# Patient Record
Sex: FEMALE | Race: BLACK OR AFRICAN AMERICAN | Employment: OTHER | URBAN - METROPOLITAN AREA
[De-identification: names, ages, dates, MRNs, and addresses within clinical notes are randomized per-mention and may not be internally consistent; named-entity substitution may affect disease eponyms.]

---

## 2017-01-06 ENCOUNTER — HOSPITAL ENCOUNTER (OUTPATIENT)
Dept: WOMENS IMAGING | Age: 57
Discharge: HOME | End: 2017-01-06
Payer: COMMERCIAL

## 2017-01-06 ENCOUNTER — HOSPITAL ENCOUNTER (OUTPATIENT)
Dept: ULTRASOUND IMAGING | Age: 57
Discharge: HOME | End: 2017-01-06
Payer: COMMERCIAL

## 2017-01-06 DIAGNOSIS — R92.8 ABNORMAL MAMMOGRAM: ICD-10-CM

## 2017-01-06 PROCEDURE — G0206 DX MAMMO INCL CAD UNI: HCPCS

## 2017-01-06 PROCEDURE — 76642 ULTRASOUND BREAST LIMITED: CPT

## 2017-07-28 PROBLEM — M47.26 OTHER SPONDYLOSIS WITH RADICULOPATHY, LUMBAR REGION: Status: ACTIVE | Noted: 2017-07-28

## 2017-08-02 PROBLEM — M47.26 OSTEOARTHRITIS OF SPINE WITH RADICULOPATHY, LUMBAR REGION: Status: ACTIVE | Noted: 2017-08-02

## 2017-09-07 PROBLEM — M54.16 LEFT LUMBAR RADICULOPATHY: Status: ACTIVE | Noted: 2017-09-07

## 2017-11-14 PROBLEM — M48.061 BILATERAL STENOSIS OF LATERAL RECESS OF LUMBAR SPINE: Status: ACTIVE | Noted: 2017-11-14

## 2018-02-12 ENCOUNTER — OFFICE VISIT (OUTPATIENT)
Dept: OBGYN CLINIC | Age: 58
End: 2018-02-12
Payer: COMMERCIAL

## 2018-02-12 VITALS
RESPIRATION RATE: 14 BRPM | HEART RATE: 84 BPM | BODY MASS INDEX: 33.71 KG/M2 | SYSTOLIC BLOOD PRESSURE: 110 MMHG | HEIGHT: 58 IN | DIASTOLIC BLOOD PRESSURE: 70 MMHG | OXYGEN SATURATION: 97 % | WEIGHT: 160.6 LBS

## 2018-02-12 DIAGNOSIS — Z01.419 WELL WOMAN EXAM WITH ROUTINE GYNECOLOGICAL EXAM: Primary | ICD-10-CM

## 2018-02-12 DIAGNOSIS — Z12.39 BREAST CANCER SCREENING: ICD-10-CM

## 2018-02-12 DIAGNOSIS — N95.0 POSTMENOPAUSAL BLEEDING: ICD-10-CM

## 2018-02-12 DIAGNOSIS — Z11.51 SCREENING FOR HPV (HUMAN PAPILLOMAVIRUS): ICD-10-CM

## 2018-02-12 LAB — PAP SMEAR: NORMAL

## 2018-02-12 PROCEDURE — 99386 PREV VISIT NEW AGE 40-64: CPT | Performed by: NURSE PRACTITIONER

## 2018-02-12 NOTE — PROGRESS NOTES
warm and dry, normal texture and tone, no lesions    Pelvic Exam:   EFG: normal external genitalia, atrophic changes noted   URETHRA: normal appearing without diverticula or lesions   VULVA: normal appearing vulva with no masses, tenderness or lesions, atrophy noted   VAGINA: atrophic changes, pale mucosa  CERVIX: nulliparous, no lesions, os is stenotic  UTERUS: difficult to assess due to body habitus, nontender. ADNEXA: difficult to assess due to body habitus, nontender. PERINEUM: normal appearing without lesions or masses   ANUS: normal appearing without lesions or masses, no fissures or hemorrhoids     ASSESSMENT:   Postmenopause  Well woman with routine gynecologic exam  Breast cancer screening  Postmenopausal bleeding    PLAN:   Counseled on short and long-term use of HRT and information given. Pt counseled on osteoporosis prevention. Advised calcium intake of 1200 mg (diet and supplement) and Vitamin D 400-800 IU daily. Pap with hpv pending  Reinforced self breast exam, mammogram ordered  Pt advised to see Dr. Giuliana Roberto for postmenopausal bleeding evaluation next available opening. Encouraged healthy lifestyle and moderated exercise (30 mins walking daily). Information given on BMI and discussed healthy weight range for her. Pt verbalized understanding. Routine health screenings and precautions discussed. RTC 12 months for well woman exam or visits PRN.

## 2018-02-12 NOTE — PATIENT INSTRUCTIONS
a breast self-exam?  · The best time to examine your breasts is usually one week after your menstrual period begins. Your breasts should not be tender then. If you do not have periods, you might do your exam on a day of the month that is easy to remember. · To examine your breasts:  ¨ Remove all your clothes above the waist and lie down. When you are lying down, your breast tissue spreads evenly over your chest wall, which makes it easier to feel all your breast tissue. ¨ Use the pads-not the fingertips-of the 3 middle fingers of your left hand to check your right breast. Move your fingers slowly in small coin-sized circles that overlap. ¨ Use three levels of pressure to feel of all your breast tissue. Use light pressure to feel the tissue close to the skin surface. Use medium pressure to feel a little deeper. Use firm pressure to feel your tissue close to your breastbone and ribs. Use each pressure level to feel your breast tissue before moving on to the next spot. ¨ Check your entire breast, moving up and down as if following a strip from the collarbone to the bra line, and from the armpit to the ribs. Repeat until you have covered the entire breast.  ¨ Repeat this procedure for your left breast, using the pads of the 3 middle fingers of your right hand. · To examine your breasts while in the shower:  ¨ Place one arm over your head and lightly soap your breast on that side. ¨ Using the pads of your fingers, gently move your hand over your breast (in the strip pattern described above), feeling carefully for any lumps or changes. ¨ Repeat for the other breast.  · Have your doctor inspect anything you notice to see if you need further testing. Where can you learn more? Go to https://ashley.InvenSense. org and sign in to your Wug[employer] account. Enter P148 in the [facility] box to learn more about \"Breast Self-Exam: Care Instructions. \"     If you do not have an account, please click on day.  ¨ Eat foods rich in calcium, like yogurt, cheese, milk, and dark green vegetables. ¨ Eat foods rich in vitamin D, like eggs, fatty fish, cereal, and fortified milk. ¨ Get some sunshine. Your body uses sunshine to make its own vitamin D. The safest time to be out in the sun is before 10 a.m. or after 3 p.m. Avoid getting sunburned. Sunburn can increase your risk of skin cancer. ¨ Talk to your doctor about taking a calcium plus vitamin D supplement. Ask about what type of calcium is right for you, and how much to take at a time. Adults ages 23 to 48 should not get more than 2,500 mg of calcium and 4,000 IU of vitamin D each day, whether it is from supplements and/or food. Adults ages 46 and older should not get more than 2,000 mg of calcium and 4,000 IU of vitamin D each day from supplements and/or food. · Get regular bone-building exercise. Weight-bearing and resistance exercises keep bones healthy by working the muscles and bones against gravity. Start out at an exercise level that feels right for you. Add a little at a time until you can do the following:  ¨ Do 30 minutes of weight-bearing exercise on most days of the week. Walking, jogging, stair climbing, and dancing are good choices. ¨ Do resistance exercises with weights or elastic bands 2 to 3 days a week. · Limit alcohol. Drink no more than 1 alcohol drink a day if you are a woman. Drink no more than 2 alcohol drinks a day if you are a man. · Do not smoke. Smoking can make bones thin faster. If you need help quitting, talk to your doctor about stop-smoking programs and medicines. These can increase your chances of quitting for good. When should you call for help? Watch closely for changes in your health, and be sure to contact your doctor if you have any problems. Where can you learn more? Go to https://ashley.brotips. org and sign in to your [employer] account.  Enter L444 in the [employer] box to learn more about alcohol or use tobacco products. Follow-up care is a key part of your treatment and safety. Be sure to make and go to all appointments, and call your doctor if you are having problems. It's also a good idea to know your test results and keep a list of the medicines you take. How can you care for yourself at home? · Practice healthy eating habits. This includes eating plenty of fruits, vegetables, whole grains, lean protein, and low-fat dairy. · If your doctor recommends it, get more exercise. Walking is a good choice. Bit by bit, increase the amount you walk every day. Try for at least 30 minutes on most days of the week. · Do not smoke. Smoking can increase your risk for health problems. If you need help quitting, talk to your doctor about stop-smoking programs and medicines. These can increase your chances of quitting for good. · Limit alcohol to 2 drinks a day for men and 1 drink a day for women. Too much alcohol can cause health problems. If you have a BMI higher than 25  · Your doctor may do other tests to check your risk for weight-related health problems. This may include measuring the distance around your waist. A waist measurement of more than 40 inches in men or 35 inches in women can increase the risk of weight-related health problems. · Talk with your doctor about steps you can take to stay healthy or improve your health. You may need to make lifestyle changes to lose weight and stay healthy, such as changing your diet and getting regular exercise. If you have a BMI lower than 18.5  · Your doctor may do other tests to check your risk for health problems. · Talk with your doctor about steps you can take to stay healthy or improve your health. You may need to make lifestyle changes to gain or maintain weight and stay healthy, such as getting more healthy foods in your diet and doing exercises to build muscle. Where can you learn more? Go to https://ashley.[facility]. org and sign in to

## 2018-02-27 DIAGNOSIS — Z01.419 WELL WOMAN EXAM WITH ROUTINE GYNECOLOGICAL EXAM: ICD-10-CM

## 2018-02-27 DIAGNOSIS — Z11.51 SCREENING FOR HPV (HUMAN PAPILLOMAVIRUS): ICD-10-CM

## 2018-05-22 ENCOUNTER — HOSPITAL ENCOUNTER (OUTPATIENT)
Dept: ORTHOPEDIC SURGERY | Age: 58
Discharge: HOME | End: 2018-05-24
Payer: COMMERCIAL

## 2018-05-22 DIAGNOSIS — M84.452D PATHOLOGICAL FRACTURE OF NECK OF LEFT FEMUR WITH ROUTINE HEALING, SUBSEQUENT ENCOUNTER: ICD-10-CM

## 2018-05-22 PROCEDURE — 73562 X-RAY EXAM OF KNEE 3: CPT

## 2018-10-15 ENCOUNTER — OFFICE VISIT (OUTPATIENT)
Dept: OBGYN CLINIC | Age: 58
End: 2018-10-15
Payer: COMMERCIAL

## 2018-10-15 VITALS
BODY MASS INDEX: 33.17 KG/M2 | DIASTOLIC BLOOD PRESSURE: 80 MMHG | WEIGHT: 158 LBS | SYSTOLIC BLOOD PRESSURE: 102 MMHG | HEIGHT: 58 IN

## 2018-10-15 DIAGNOSIS — N95.1 MENOPAUSAL SYMPTOMS: Primary | ICD-10-CM

## 2018-10-15 DIAGNOSIS — Z12.39 BREAST CANCER SCREENING: ICD-10-CM

## 2018-10-15 DIAGNOSIS — F51.01 PRIMARY INSOMNIA: ICD-10-CM

## 2018-10-15 PROCEDURE — 1036F TOBACCO NON-USER: CPT | Performed by: OBSTETRICS & GYNECOLOGY

## 2018-10-15 PROCEDURE — 3017F COLORECTAL CA SCREEN DOC REV: CPT | Performed by: OBSTETRICS & GYNECOLOGY

## 2018-10-15 PROCEDURE — G8417 CALC BMI ABV UP PARAM F/U: HCPCS | Performed by: OBSTETRICS & GYNECOLOGY

## 2018-10-15 PROCEDURE — G8484 FLU IMMUNIZE NO ADMIN: HCPCS | Performed by: OBSTETRICS & GYNECOLOGY

## 2018-10-15 PROCEDURE — G8428 CUR MEDS NOT DOCUMENT: HCPCS | Performed by: OBSTETRICS & GYNECOLOGY

## 2018-10-15 PROCEDURE — 99202 OFFICE O/P NEW SF 15 MIN: CPT | Performed by: OBSTETRICS & GYNECOLOGY

## 2018-10-15 RX ORDER — NORETHINDRONE ACETATE AND ETHINYL ESTRADIOL 1; 5 MG/1; UG/1
TABLET ORAL
COMMUNITY
End: 2018-10-15 | Stop reason: SDUPTHER

## 2018-10-15 RX ORDER — ESCITALOPRAM OXALATE 10 MG/1
TABLET ORAL
Refills: 3 | COMMUNITY
Start: 2018-09-07 | End: 2019-05-14 | Stop reason: DRUGHIGH

## 2018-10-15 RX ORDER — IBUPROFEN 800 MG/1
TABLET ORAL
Refills: 1 | COMMUNITY
Start: 2018-08-30 | End: 2019-08-23 | Stop reason: ALTCHOICE

## 2018-10-15 RX ORDER — LORATADINE 10 MG/1
10 TABLET ORAL
COMMUNITY
Start: 2012-05-08 | End: 2019-05-14 | Stop reason: ALTCHOICE

## 2018-10-15 RX ORDER — ATORVASTATIN CALCIUM 20 MG/1
TABLET, FILM COATED ORAL
Refills: 5 | COMMUNITY
Start: 2018-09-05

## 2018-10-15 RX ORDER — ZOLPIDEM TARTRATE 5 MG/1
5 TABLET ORAL NIGHTLY PRN
Qty: 30 TABLET | Refills: 1 | Status: SHIPPED | OUTPATIENT
Start: 2018-10-15 | End: 2018-10-29

## 2018-10-15 RX ORDER — LISINOPRIL AND HYDROCHLOROTHIAZIDE 20; 12.5 MG/1; MG/1
1 TABLET ORAL
COMMUNITY

## 2018-10-15 RX ORDER — TRAMADOL HYDROCHLORIDE 50 MG/1
TABLET ORAL
Refills: 0 | COMMUNITY
Start: 2018-10-05 | End: 2019-05-14

## 2018-10-15 RX ORDER — NORETHINDRONE ACETATE AND ETHINYL ESTRADIOL 1; 5 MG/1; UG/1
1 TABLET ORAL DAILY
Qty: 28 TABLET | Refills: 12 | Status: SHIPPED | OUTPATIENT
Start: 2018-10-15 | End: 2019-05-14

## 2018-10-15 NOTE — PROGRESS NOTES
SUBJECTIVE:   60 y.o.   female here to discuss HRT. PT has been on HRT for 15yrs and pt ran out of her rx. PT states since stopping medications she has felt allen and irritable with hot flushes and insomnia. PT has tried otc meds for insomnia that are not resolving her issues. Pt would like to restart HRT. PT denies any VB and pt with annual exam that was normal last year. PT does need referral for MMG. Review of Systems:  General ROS: negative  Respiratory ROS: no cough, shortness of breath, or wheezing  Cardiovascular ROS: no chest pain or dyspnea on exertion  Gastrointestinal ROS: no abdominal pain, change in bowel habits, or black or bloody stools  Genito-Urinary ROS: no dysuria, trouble voiding, or hematuria    OBJECTIVE:   /80   Ht 4' 10\" (1.473 m)   Wt 158 lb (71.7 kg)   BMI 33.02 kg/m²     Physical Exam:  GEN: She appears well, afebrile. HEENT: normal cephalic, atraumatic  CVS: regular rate and rhythm  ABDOMEN: benign, soft, nontender, no masses. MUSCULOSKELETAL: normal gait, no masses  SKIN: normal texture and tone, no lesions    ASSESSMENT:   Menopausal symptoms  Breast cancer screening  Insomnia    PLAN:   Risks, benefits and alternative therapies for HRT reviewed.  PT would like to restart therapy  Rx HRT to pharmacy   Rx Ambien to pharmacy   [facility] referral sent in

## 2018-11-07 ENCOUNTER — HOSPITAL ENCOUNTER (OUTPATIENT)
Dept: WOMENS IMAGING | Age: 58
Discharge: HOME | End: 2018-11-09
Payer: COMMERCIAL

## 2018-11-07 ENCOUNTER — TELEPHONE (OUTPATIENT)
Dept: OBGYN CLINIC | Age: 58
End: 2018-11-07

## 2018-11-07 DIAGNOSIS — R92.8 ABNORMAL MAMMOGRAM: Primary | ICD-10-CM

## 2018-11-07 DIAGNOSIS — Z12.39 BREAST CANCER SCREENING: ICD-10-CM

## 2018-11-07 PROCEDURE — 77067 SCR MAMMO BI INCL CAD: CPT

## 2018-11-15 ENCOUNTER — HOSPITAL ENCOUNTER (OUTPATIENT)
Dept: WOMENS IMAGING | Age: 58
Discharge: HOME | End: 2018-11-17
Payer: COMMERCIAL

## 2018-11-15 ENCOUNTER — HOSPITAL ENCOUNTER (OUTPATIENT)
Dept: ULTRASOUND IMAGING | Age: 58
Discharge: HOME | End: 2018-11-17
Payer: COMMERCIAL

## 2018-11-15 DIAGNOSIS — R92.8 ABNORMAL MAMMOGRAM: ICD-10-CM

## 2018-11-15 PROCEDURE — 77065 DX MAMMO INCL CAD UNI: CPT

## 2018-11-15 PROCEDURE — 76642 ULTRASOUND BREAST LIMITED: CPT

## 2018-11-16 ENCOUNTER — TELEPHONE (OUTPATIENT)
Dept: OBGYN CLINIC | Age: 58
End: 2018-11-16

## 2018-11-16 DIAGNOSIS — R92.8 ABNORMAL MAMMOGRAM: Primary | ICD-10-CM

## 2018-11-16 NOTE — TELEPHONE ENCOUNTER
----- Message from Nicolle Flowers MD sent at 11/15/2018  5:41 PM EST -----  F/u for repeat MMG in 4-6mos

## 2019-01-03 PROBLEM — M41.50 DEGENERATIVE SCOLIOSIS: Status: ACTIVE | Noted: 2019-01-03

## 2019-01-03 PROBLEM — M48.061 FORAMINAL STENOSIS OF LUMBAR REGION: Status: ACTIVE | Noted: 2019-01-03

## 2019-05-14 ENCOUNTER — OFFICE VISIT (OUTPATIENT)
Dept: OBGYN CLINIC | Age: 59
End: 2019-05-14
Payer: COMMERCIAL

## 2019-05-14 VITALS
WEIGHT: 159 LBS | SYSTOLIC BLOOD PRESSURE: 120 MMHG | BODY MASS INDEX: 33.37 KG/M2 | HEIGHT: 58 IN | DIASTOLIC BLOOD PRESSURE: 70 MMHG

## 2019-05-14 DIAGNOSIS — N95.0 PMB (POSTMENOPAUSAL BLEEDING): Primary | ICD-10-CM

## 2019-05-14 DIAGNOSIS — N95.0 PMB (POSTMENOPAUSAL BLEEDING): ICD-10-CM

## 2019-05-14 LAB
FOLLICLE STIMULATING HORMONE: 43.1 MIU/ML
T4 FREE: 0.77 NG/DL (ref 0.84–1.68)
TSH REFLEX: 2.49 UIU/ML (ref 0.44–3.86)

## 2019-05-14 PROCEDURE — 1036F TOBACCO NON-USER: CPT | Performed by: OBSTETRICS & GYNECOLOGY

## 2019-05-14 PROCEDURE — G8427 DOCREV CUR MEDS BY ELIG CLIN: HCPCS | Performed by: OBSTETRICS & GYNECOLOGY

## 2019-05-14 PROCEDURE — 3017F COLORECTAL CA SCREEN DOC REV: CPT | Performed by: OBSTETRICS & GYNECOLOGY

## 2019-05-14 PROCEDURE — G8417 CALC BMI ABV UP PARAM F/U: HCPCS | Performed by: OBSTETRICS & GYNECOLOGY

## 2019-05-14 PROCEDURE — 99213 OFFICE O/P EST LOW 20 MIN: CPT | Performed by: OBSTETRICS & GYNECOLOGY

## 2019-05-14 RX ORDER — ZOLPIDEM TARTRATE 10 MG/1
TABLET ORAL NIGHTLY PRN
COMMUNITY
End: 2020-08-13 | Stop reason: SDUPTHER

## 2019-05-14 RX ORDER — ESCITALOPRAM OXALATE 20 MG/1
TABLET ORAL
Refills: 3 | COMMUNITY
Start: 2019-04-15 | End: 2019-05-14

## 2019-05-14 RX ORDER — NORETHINDRONE ACETATE AND ETHINYL ESTRADIOL 1; 5 MG/1; UG/1
1 TABLET ORAL DAILY
COMMUNITY
End: 2019-11-11 | Stop reason: SDUPTHER

## 2019-05-14 RX ORDER — GABAPENTIN 300 MG/1
CAPSULE ORAL
Refills: 1 | COMMUNITY
Start: 2019-03-01 | End: 2019-05-14

## 2019-05-14 NOTE — PROGRESS NOTES
SUBJECTIVE:   60 y.o.   female here to discuss an episode of VB. PT currently on HRT and she forgot to take her meds for several days and she started with light spotting like a menses for 4 days that has since resolved. PT restarted her HRT when she remembered her meds and pt denies VB today. PT states she is feeling much better with the HRT and pt would like to continue therapy. Review of Systems:  General ROS: negative  Respiratory ROS: no cough, shortness of breath, or wheezing  Cardiovascular ROS: no chest pain or dyspnea on exertion  Gastrointestinal ROS: no abdominal pain, change in bowel habits, or black or bloody stools  Genito-Urinary ROS: no dysuria, trouble voiding, or hematuria    OBJECTIVE:   /70   Ht 4' 10\" (1.473 m)   Wt 159 lb (72.1 kg)   BMI 33.23 kg/m²     Physical Exam:  GEN: She appears well, afebrile. HEENT: normal cephalic, atraumatic  CVS: regular rate and rhythm  ABDOMEN: benign, soft, nontender, no masses.   MUSCULOSKELETAL: normal gait, no masses  SKIN: normal texture and tone, no lesions    ASSESSMENT:   Postmenopausal bleeding     PLAN:   TSH/FSH pending  US pending  Pt to f/u for results, if ES >4mm will plan for EMB

## 2019-05-14 NOTE — PROGRESS NOTES
The patient was asked if she would like a chaperone present for her intimate exam. She  Declined the chaperone.  Teena Paez

## 2019-05-22 ENCOUNTER — HOSPITAL ENCOUNTER (OUTPATIENT)
Dept: ULTRASOUND IMAGING | Age: 59
Discharge: HOME | End: 2019-05-24
Payer: COMMERCIAL

## 2019-05-22 DIAGNOSIS — N95.0 PMB (POSTMENOPAUSAL BLEEDING): ICD-10-CM

## 2019-05-22 PROCEDURE — 76856 US EXAM PELVIC COMPLETE: CPT

## 2019-05-22 PROCEDURE — 76830 TRANSVAGINAL US NON-OB: CPT

## 2019-05-28 DIAGNOSIS — R92.8 ABNORMAL MAMMOGRAM OF LEFT BREAST: Primary | ICD-10-CM

## 2019-06-03 ENCOUNTER — OFFICE VISIT (OUTPATIENT)
Dept: OBGYN CLINIC | Age: 59
End: 2019-06-03
Payer: COMMERCIAL

## 2019-06-03 VITALS
WEIGHT: 157.6 LBS | DIASTOLIC BLOOD PRESSURE: 70 MMHG | BODY MASS INDEX: 33.08 KG/M2 | SYSTOLIC BLOOD PRESSURE: 138 MMHG | HEIGHT: 58 IN

## 2019-06-03 DIAGNOSIS — Z09 FOLLOW UP: Primary | ICD-10-CM

## 2019-06-03 PROCEDURE — 99213 OFFICE O/P EST LOW 20 MIN: CPT | Performed by: OBSTETRICS & GYNECOLOGY

## 2019-06-03 PROCEDURE — G8427 DOCREV CUR MEDS BY ELIG CLIN: HCPCS | Performed by: OBSTETRICS & GYNECOLOGY

## 2019-06-03 PROCEDURE — 3017F COLORECTAL CA SCREEN DOC REV: CPT | Performed by: OBSTETRICS & GYNECOLOGY

## 2019-06-03 PROCEDURE — 1036F TOBACCO NON-USER: CPT | Performed by: OBSTETRICS & GYNECOLOGY

## 2019-06-03 PROCEDURE — G8417 CALC BMI ABV UP PARAM F/U: HCPCS | Performed by: OBSTETRICS & GYNECOLOGY

## 2019-06-03 RX ORDER — ESCITALOPRAM OXALATE 20 MG/1
TABLET ORAL
Refills: 3 | COMMUNITY
Start: 2019-05-22 | End: 2020-01-13

## 2019-06-06 ENCOUNTER — TELEPHONE (OUTPATIENT)
Dept: OBGYN CLINIC | Age: 59
End: 2019-06-06

## 2019-06-14 ENCOUNTER — NURSE ONLY (OUTPATIENT)
Dept: OBGYN CLINIC | Age: 59
End: 2019-06-14
Payer: COMMERCIAL

## 2019-06-14 DIAGNOSIS — N93.8 DUB (DYSFUNCTIONAL UTERINE BLEEDING): ICD-10-CM

## 2019-06-14 DIAGNOSIS — N93.8 DUB (DYSFUNCTIONAL UTERINE BLEEDING): Primary | ICD-10-CM

## 2019-06-14 LAB
BASOPHILS ABSOLUTE: 0 K/UL (ref 0–0.2)
BASOPHILS RELATIVE PERCENT: 0.8 %
EOSINOPHILS ABSOLUTE: 0.2 K/UL (ref 0–0.7)
EOSINOPHILS RELATIVE PERCENT: 3.4 %
GONADOTROPIN, CHORIONIC (HCG) QUANT: <0.1 MIU/ML
HCT VFR BLD CALC: 38 % (ref 37–47)
HEMOGLOBIN: 12.5 G/DL (ref 12–16)
LUTEINIZING HORMONE: 28.7 MIU/ML
LYMPHOCYTES ABSOLUTE: 1.8 K/UL (ref 1–4.8)
LYMPHOCYTES RELATIVE PERCENT: 33.9 %
MCH RBC QN AUTO: 28.4 PG (ref 27–31.3)
MCHC RBC AUTO-ENTMCNC: 32.8 % (ref 33–37)
MCV RBC AUTO: 86.6 FL (ref 82–100)
MONOCYTES ABSOLUTE: 0.6 K/UL (ref 0.2–0.8)
MONOCYTES RELATIVE PERCENT: 10.2 %
NEUTROPHILS ABSOLUTE: 2.8 K/UL (ref 1.4–6.5)
NEUTROPHILS RELATIVE PERCENT: 51.7 %
PDW BLD-RTO: 14.1 % (ref 11.5–14.5)
PLATELET # BLD: 295 K/UL (ref 130–400)
RBC # BLD: 4.39 M/UL (ref 4.2–5.4)
WBC # BLD: 5.4 K/UL (ref 4.8–10.8)

## 2019-06-14 PROCEDURE — 36415 COLL VENOUS BLD VENIPUNCTURE: CPT | Performed by: OBSTETRICS & GYNECOLOGY

## 2019-08-05 ENCOUNTER — PROCEDURE VISIT (OUTPATIENT)
Dept: OBGYN CLINIC | Age: 59
End: 2019-08-05
Payer: COMMERCIAL

## 2019-08-05 VITALS — WEIGHT: 157 LBS | BODY MASS INDEX: 32.81 KG/M2 | DIASTOLIC BLOOD PRESSURE: 68 MMHG | SYSTOLIC BLOOD PRESSURE: 128 MMHG

## 2019-08-05 DIAGNOSIS — N93.8 DUB (DYSFUNCTIONAL UTERINE BLEEDING): Primary | ICD-10-CM

## 2019-08-05 DIAGNOSIS — N93.8 DUB (DYSFUNCTIONAL UTERINE BLEEDING): ICD-10-CM

## 2019-08-05 LAB
HCG, URINE, POC: NEGATIVE
Lab: NORMAL
NEGATIVE QC PASS/FAIL: NORMAL
POSITIVE QC PASS/FAIL: NORMAL

## 2019-08-05 PROCEDURE — 3017F COLORECTAL CA SCREEN DOC REV: CPT | Performed by: OBSTETRICS & GYNECOLOGY

## 2019-08-05 PROCEDURE — G8427 DOCREV CUR MEDS BY ELIG CLIN: HCPCS | Performed by: OBSTETRICS & GYNECOLOGY

## 2019-08-05 PROCEDURE — 1036F TOBACCO NON-USER: CPT | Performed by: OBSTETRICS & GYNECOLOGY

## 2019-08-05 PROCEDURE — 58558 HYSTEROSCOPY BIOPSY: CPT | Performed by: OBSTETRICS & GYNECOLOGY

## 2019-08-05 PROCEDURE — 99213 OFFICE O/P EST LOW 20 MIN: CPT | Performed by: OBSTETRICS & GYNECOLOGY

## 2019-08-05 PROCEDURE — G8417 CALC BMI ABV UP PARAM F/U: HCPCS | Performed by: OBSTETRICS & GYNECOLOGY

## 2019-08-05 NOTE — PROGRESS NOTES
A timeout was performed immediately prior to the start of the Embx/Endosee procedure and included the correct patient (two identifiers), correct procedure and correct site(s). Procedure consent and allergies were also verified.

## 2019-08-12 ENCOUNTER — TELEPHONE (OUTPATIENT)
Dept: OBGYN CLINIC | Age: 59
End: 2019-08-12

## 2019-08-13 NOTE — TELEPHONE ENCOUNTER
Monitor bleeding. As long as not filling an overnight pad per hour for > 2 hours can observe. Will discuss options for bleeding control at her F/U appointment.

## 2019-08-20 ENCOUNTER — OFFICE VISIT (OUTPATIENT)
Dept: OBGYN CLINIC | Age: 59
End: 2019-08-20
Payer: COMMERCIAL

## 2019-08-20 VITALS — BODY MASS INDEX: 32.81 KG/M2 | SYSTOLIC BLOOD PRESSURE: 130 MMHG | DIASTOLIC BLOOD PRESSURE: 72 MMHG | WEIGHT: 157 LBS

## 2019-08-20 DIAGNOSIS — N95.0 PMB (POSTMENOPAUSAL BLEEDING): Primary | ICD-10-CM

## 2019-08-20 PROCEDURE — 3017F COLORECTAL CA SCREEN DOC REV: CPT | Performed by: OBSTETRICS & GYNECOLOGY

## 2019-08-20 PROCEDURE — G8417 CALC BMI ABV UP PARAM F/U: HCPCS | Performed by: OBSTETRICS & GYNECOLOGY

## 2019-08-20 PROCEDURE — 1036F TOBACCO NON-USER: CPT | Performed by: OBSTETRICS & GYNECOLOGY

## 2019-08-20 PROCEDURE — 99213 OFFICE O/P EST LOW 20 MIN: CPT | Performed by: OBSTETRICS & GYNECOLOGY

## 2019-08-20 PROCEDURE — G8427 DOCREV CUR MEDS BY ELIG CLIN: HCPCS | Performed by: OBSTETRICS & GYNECOLOGY

## 2019-08-20 ASSESSMENT — ENCOUNTER SYMPTOMS
NAUSEA: 0
BLOOD IN STOOL: 0
RECTAL PAIN: 0
VOMITING: 0
ALLERGIC/IMMUNOLOGIC NEGATIVE: 1
ABDOMINAL DISTENTION: 0
CONSTIPATION: 0
RESPIRATORY NEGATIVE: 1
ABDOMINAL PAIN: 0
DIARRHEA: 0
ANAL BLEEDING: 0
EYES NEGATIVE: 1

## 2019-08-20 NOTE — PROGRESS NOTES
History     Socioeconomic History    Marital status:      Spouse name: Not on file    Number of children: Not on file    Years of education: Not on file    Highest education level: Not on file   Occupational History    Not on file   Social Needs    Financial resource strain: Not on file    Food insecurity:     Worry: Not on file     Inability: Not on file    Transportation needs:     Medical: Not on file     Non-medical: Not on file   Tobacco Use    Smoking status: Never Smoker    Smokeless tobacco: Never Used   Substance and Sexual Activity    Alcohol use: No    Drug use: No    Sexual activity: Yes   Lifestyle    Physical activity:     Days per week: Not on file     Minutes per session: Not on file    Stress: Not on file   Relationships    Social connections:     Talks on phone: Not on file     Gets together: Not on file     Attends Taoism service: Not on file     Active member of club or organization: Not on file     Attends meetings of clubs or organizations: Not on file     Relationship status: Not on file    Intimate partner violence:     Fear of current or ex partner: Not on file     Emotionally abused: Not on file     Physically abused: Not on file     Forced sexual activity: Not on file   Other Topics Concern    Not on file   Social History Narrative    Not on file        Family History   Problem Relation Age of Onset    Diabetes Mother     Breast Cancer Neg Hx     Cancer Neg Hx     Colon Cancer Neg Hx     Eclampsia Neg Hx     Hypertension Neg Hx     Ovarian Cancer Neg Hx      Labor Neg Hx     Spont Abortions Neg Hx     Stroke Neg Hx        Review of Systems   Constitutional: Negative. Negative for activity change, appetite change, chills, diaphoresis, fatigue, fever and unexpected weight change. HENT: Negative. Eyes: Negative. Respiratory: Negative. Cardiovascular: Negative.     Gastrointestinal: Negative for abdominal distention, abdominal pain, anal

## 2019-11-12 RX ORDER — NORETHINDRONE ACETATE AND ETHINYL ESTRADIOL 1; 5 MG/1; UG/1
1 TABLET ORAL DAILY
Qty: 84 TABLET | Refills: 1 | Status: SHIPPED | OUTPATIENT
Start: 2019-11-12 | End: 2020-05-19

## 2019-11-13 ENCOUNTER — HOSPITAL ENCOUNTER (OUTPATIENT)
Dept: ULTRASOUND IMAGING | Age: 59
Discharge: HOME | End: 2019-11-15
Payer: COMMERCIAL

## 2019-11-13 DIAGNOSIS — N95.0 PMB (POSTMENOPAUSAL BLEEDING): ICD-10-CM

## 2019-11-14 ENCOUNTER — HOSPITAL ENCOUNTER (OUTPATIENT)
Dept: ULTRASOUND IMAGING | Age: 59
Discharge: HOME | End: 2019-11-16
Payer: COMMERCIAL

## 2019-11-14 PROCEDURE — 76856 US EXAM PELVIC COMPLETE: CPT

## 2019-11-14 PROCEDURE — 76830 TRANSVAGINAL US NON-OB: CPT

## 2019-11-21 ENCOUNTER — OFFICE VISIT (OUTPATIENT)
Dept: OBGYN CLINIC | Age: 59
End: 2019-11-21
Payer: COMMERCIAL

## 2019-11-21 VITALS — DIASTOLIC BLOOD PRESSURE: 82 MMHG | BODY MASS INDEX: 32.72 KG/M2 | SYSTOLIC BLOOD PRESSURE: 128 MMHG | WEIGHT: 162 LBS

## 2019-11-21 DIAGNOSIS — G47.00 INSOMNIA, UNSPECIFIED TYPE: ICD-10-CM

## 2019-11-21 DIAGNOSIS — N95.0 PMB (POSTMENOPAUSAL BLEEDING): Primary | ICD-10-CM

## 2019-11-21 PROCEDURE — 1036F TOBACCO NON-USER: CPT | Performed by: OBSTETRICS & GYNECOLOGY

## 2019-11-21 PROCEDURE — 99213 OFFICE O/P EST LOW 20 MIN: CPT | Performed by: OBSTETRICS & GYNECOLOGY

## 2019-11-21 PROCEDURE — G8417 CALC BMI ABV UP PARAM F/U: HCPCS | Performed by: OBSTETRICS & GYNECOLOGY

## 2019-11-21 PROCEDURE — G8427 DOCREV CUR MEDS BY ELIG CLIN: HCPCS | Performed by: OBSTETRICS & GYNECOLOGY

## 2019-11-21 PROCEDURE — 3017F COLORECTAL CA SCREEN DOC REV: CPT | Performed by: OBSTETRICS & GYNECOLOGY

## 2019-11-21 PROCEDURE — G8484 FLU IMMUNIZE NO ADMIN: HCPCS | Performed by: OBSTETRICS & GYNECOLOGY

## 2019-11-21 RX ORDER — ZOLPIDEM TARTRATE 5 MG/1
5 TABLET ORAL NIGHTLY PRN
Qty: 30 TABLET | Refills: 1 | Status: SHIPPED | OUTPATIENT
Start: 2019-11-21 | End: 2022-08-29 | Stop reason: SDUPTHER

## 2019-11-21 ASSESSMENT — ENCOUNTER SYMPTOMS
ABDOMINAL PAIN: 0
ALLERGIC/IMMUNOLOGIC NEGATIVE: 1
ABDOMINAL DISTENTION: 0
NAUSEA: 0
CONSTIPATION: 0
RESPIRATORY NEGATIVE: 1
RECTAL PAIN: 0
EYES NEGATIVE: 1
DIARRHEA: 0
BLOOD IN STOOL: 0
VOMITING: 0
ANAL BLEEDING: 0

## 2020-02-13 ENCOUNTER — OFFICE VISIT (OUTPATIENT)
Dept: OBGYN CLINIC | Age: 60
End: 2020-02-13
Payer: COMMERCIAL

## 2020-02-13 VITALS — WEIGHT: 162 LBS | DIASTOLIC BLOOD PRESSURE: 72 MMHG | BODY MASS INDEX: 33.86 KG/M2 | SYSTOLIC BLOOD PRESSURE: 132 MMHG

## 2020-02-13 PROCEDURE — 99396 PREV VISIT EST AGE 40-64: CPT | Performed by: OBSTETRICS & GYNECOLOGY

## 2020-02-13 PROCEDURE — G8484 FLU IMMUNIZE NO ADMIN: HCPCS | Performed by: OBSTETRICS & GYNECOLOGY

## 2020-02-13 NOTE — PROGRESS NOTES
 Financial resource strain: Not on file   Benjamin-E[place] insecurity:     Worry: Not on file     Inability: Not on file    Transportation needs:     Medical: Not on file     Non-medical: Not on file   Tobacco Use    Smoking status: Never Smoker    Smokeless tobacco: Never Used   Substance and Sexual Activity    Alcohol use: No    Drug use: No    Sexual activity: Yes   Lifestyle    Physical activity:     Days per week: Not on file     Minutes per session: Not on file    Stress: Not on file   Relationships    Social connections:     Talks on phone: Not on file     Gets together: Not on file     Attends Mormonism service: Not on file     Active member of club or organization: Not on file     Attends meetings of clubs or organizations: Not on file     Relationship status: Not on file    Intimate partner violence:     Fear of current or ex partner: Not on file     Emotionally abused: Not on file     Physically abused: Not on file     Forced sexual activity: Not on file   Other Topics Concern    Not on file   Social History Narrative    Not on file     Family History   Problem Relation Age of Onset    Diabetes Mother     Breast Cancer Neg Hx     Cancer Neg Hx     Colon Cancer Neg Hx     Eclampsia Neg Hx     Hypertension Neg Hx     Ovarian Cancer Neg Hx      Labor Neg Hx     Spont Abortions Neg Hx     Stroke Neg Hx        Review of Systems    Objective:     Physical Exam    Assessment:       Diagnosis Orders   1. Encounter for well woman exam with routine gynecological exam     2. Screening mammogram, encounter for  [facility] DIGITAL SCREEN W CAD BILATERAL   3. Osteoporosis screening  DEXA BONE DENSITY AXIAL SKELETON   4. Postmenopausal  DEXA BONE DENSITY AXIAL SKELETON        Plan:      Medications placedthis encounter:  No orders of the defined types were placed in this encounter.         Orders placedthis encounter:  Orders Placed This Encounter   Procedures    LIDIA DIGITAL SCREEN W CAD BILATERAL Standing Status:   Future     Standing Expiration Date:   2/12/2021    DEXA BONE DENSITY AXIAL SKELETON     Standing Status:   Future     Standing Expiration Date:   2/13/2021         Follow up:  Return in about 1 year (around 2/13/2021) for Annual.

## 2020-03-10 PROBLEM — M47.817 LUMBAR AND SACRAL SPONDYLARTHRITIS: Status: ACTIVE | Noted: 2017-08-02

## 2020-05-19 RX ORDER — NORETHINDRONE ACETATE AND ETHINYL ESTRADIOL 1; 5 MG/1; UG/1
TABLET ORAL
Qty: 84 TABLET | Refills: 3 | Status: SHIPPED | OUTPATIENT
Start: 2020-05-19 | End: 2021-08-24

## 2020-06-15 NOTE — PROGRESS NOTES
SUBJECTIVE:   60 y.o.   female here to discuss results. Pt without complaints today. PT denies any VB since her last visit. PT continues on combined HRT daily. PT with FSH 43 and TSH nml with slightly decreased T4. Review of Systems:  General ROS: negative  Respiratory ROS: no cough, shortness of breath, or wheezing  Cardiovascular ROS: no chest pain or dyspnea on exertion  Gastrointestinal ROS: no abdominal pain, change in bowel habits, or black or bloody stools  Genito-Urinary ROS: no dysuria, trouble voiding, or hematuria    OBJECTIVE:   /70   Ht 4' 10\" (1.473 m)   Wt 157 lb 9.6 oz (71.5 kg)   BMI 32.94 kg/m²     Physical Exam:  GEN: She appears well, afebrile. HEENT: normal cephalic, atraumatic  CVS: regular rate and rhythm  ABDOMEN: benign, soft, nontender, no masses.   MUSCULOSKELETAL: normal gait, no masses  SKIN: normal texture and tone, no lesions    ASSESSMENT:   Postmenopausal bleeding    PLAN:   US reviewed - ES 8mm with fundal mass  PT to f/u with Dr. Harlan Singh for Irene Mcdonald 98.3

## 2020-07-23 ENCOUNTER — HOSPITAL ENCOUNTER (OUTPATIENT)
Dept: WOMENS IMAGING | Age: 60
Discharge: HOME | End: 2020-07-25
Payer: COMMERCIAL

## 2020-07-23 ENCOUNTER — TELEPHONE (OUTPATIENT)
Dept: OBGYN CLINIC | Age: 60
End: 2020-07-23

## 2020-07-23 PROCEDURE — 77063 BREAST TOMOSYNTHESIS BI: CPT

## 2020-07-23 PROCEDURE — 77066 DX MAMMO INCL CAD BI: CPT

## 2020-07-23 PROCEDURE — 77080 DXA BONE DENSITY AXIAL: CPT

## 2020-08-13 RX ORDER — ZOLPIDEM TARTRATE 10 MG/1
5 TABLET ORAL NIGHTLY PRN
Qty: 30 TABLET | Refills: 0 | Status: SHIPPED | OUTPATIENT
Start: 2020-08-13 | End: 2020-09-12

## 2021-04-15 RX ORDER — NORETHINDRONE ACETATE AND ETHINYL ESTRADIOL 1; 5 MG/1; UG/1
TABLET ORAL
Qty: 84 TABLET | Refills: 0 | OUTPATIENT
Start: 2021-04-15

## 2021-04-23 NOTE — TELEPHONE ENCOUNTER
Protocol For Photochemotherapy: Petrolatum And Nbuvb: The patient received Photochemotherapy: Petrolatum and NBUVB (petrolatum applied to all lesions prior to phototherapy). Spoke to patient and made aware of additional imaging. Pt understood and order has been placed. Total Body Time: 00:01:24 Protocol For Photochemotherapy For Severe Photoresponsive Dermatoses: Petrolatum And Broad Band Uvb: The patient received Photochemotherapyfor severe photoresponsive dermatoses: Petrolatum and Broad Band UVB requiring at least 4 to 8 hours of care under direct physician supervision. Protocol: NBUVB Protocol For Photochemotherapy For Severe Photoresponsive Dermatoses: Tar And Broad Band Uvb (Goeckerman Treatment): The patient received Photochemotherapy for severe photoresponsive dermatoses: Tar and Broad Band UVB (Goeckerman treatment) requiring at least 4 to 8 hours of care under direct physician supervision. Protocol For Broad Band Uvb: The patient received Broad Band UVB. Protocol For Photochemotherapy For Severe Photoresponsive Dermatoses: Tar And Nbuvb (Goeckerman Treatment): The patient received Photochemotherapy for severe photoresponsive dermatoses: Tar and NBUVB (Goeckerman treatment) requiring at least 4 to 8 hours of care under direct physician supervision. Protocol For Protocol For Photochemotherapy For Severe Photoresponsive Dermatoses: Bath Puva: The patient received Photochemotherapy for severe photoresponsive dermatoses: Bath PUVA requiring at least 4 to 8 hours of care under direct physician supervision. Protocol For Photochemotherapy: Tar And Broad Band Uvb (Goeckerman Treatment): The patient received Photochemotherapy: Tar and Broad Band UVB (Goeckerman treatment). Detail Level: Zone Protocol For Nb Uva: The patient received NB UVA. Protocol For Photochemotherapy: Baby Oil And Nbuvb: The patient received Photochemotherapy: Baby Oil and NBUVB (baby oil applied to all lesions prior to phototherapy). Protocol For Puva: The patient received PUVA. Protocol For Photochemotherapy For Severe Photoresponsive Dermatoses: Petrolatum And Nbuvb: The patient received Photochemotherapy for severe photoresponsive dermatoses: Petrolatum and NBUVB requiring at least 4 to 8 hours of care under direct physician supervision. Consent: Written consent obtained.  The risks were reviewed with the patient including but not limited to: burn, pigmentary changes, pain, blistering, scabbing, redness, increased risk of skin cancers, and the remote possibility of scarring. Protocol For Photochemotherapy: Mineral Oil And Nbuvb: The patient received Photochemotherapy: Mineral Oil and NBUVB (mineral oil applied to all lesions prior to phototherapy). Protocol For Photochemotherapy For Severe Photoresponsive Dermatoses: Puva: The patient received Photochemotherapy for severe photoresponsive dermatoses: PUVA requiring at least 4 to 8 hours of care under direct physician supervision. Treatment Number: 28 Protocol For Nbuvb: The patient received NBUVB. Protocol For Uva: The patient received UVA. Protocol For Photochemotherapy: Petrolatum And Broad Band Uvb: The patient received Photochemotherapy: Petrolatum and Broad Band UVB. Render Post-Care In The Note: no Protocol For Photochemotherapy: Tar And Nbuvb (Goeckerman Treatment): The patient received Photochemotherapy: Tar and NBUVB (Goeckerman treatment). Protocol For Uva1: The patient received UVA1. Post-Care Instructions: I reviewed with the patient in detail post-care instructions. Patient is to wear sun protection. Patients may expect sunburn like redness, discomfort and scabbing. Total Body Energy: 927 Protocol For Photochemotherapy: Triamcinolone Ointment And Nbuvb: The patient received Photochemotherapy: Triamcinolone and NBUVB (triamcinolone ointment applied to all lesions prior to phototherapy). Protocol For Bath Puva: The patient received Bath PUVA. Protocol For Photochemotherapy: Mineral Oil And Broad Band Uvb: The patient received Photochemotherapy: Mineral Oil and Broad Band UVB.

## 2021-06-22 ENCOUNTER — OFFICE VISIT (OUTPATIENT)
Dept: PAIN MANAGEMENT | Age: 61
End: 2021-06-22
Payer: COMMERCIAL

## 2021-06-22 VITALS
DIASTOLIC BLOOD PRESSURE: 79 MMHG | WEIGHT: 155 LBS | HEIGHT: 58 IN | HEART RATE: 82 BPM | BODY MASS INDEX: 32.54 KG/M2 | TEMPERATURE: 97.3 F | SYSTOLIC BLOOD PRESSURE: 128 MMHG

## 2021-06-22 DIAGNOSIS — F11.90 CHRONIC, CONTINUOUS USE OF OPIOIDS: ICD-10-CM

## 2021-06-22 DIAGNOSIS — G89.29 CHRONIC PAIN OF LEFT KNEE: ICD-10-CM

## 2021-06-22 DIAGNOSIS — Z79.891 ENCOUNTER FOR MONITORING OPIOID MAINTENANCE THERAPY: ICD-10-CM

## 2021-06-22 DIAGNOSIS — M47.817 LUMBOSACRAL SPONDYLOSIS WITHOUT MYELOPATHY: ICD-10-CM

## 2021-06-22 DIAGNOSIS — M25.562 CHRONIC PAIN OF LEFT KNEE: ICD-10-CM

## 2021-06-22 DIAGNOSIS — M17.0 PRIMARY OSTEOARTHRITIS OF BOTH KNEES: Primary | ICD-10-CM

## 2021-06-22 DIAGNOSIS — Z51.81 ENCOUNTER FOR MONITORING OPIOID MAINTENANCE THERAPY: ICD-10-CM

## 2021-06-22 PROCEDURE — 1036F TOBACCO NON-USER: CPT | Performed by: NURSE PRACTITIONER

## 2021-06-22 PROCEDURE — 99213 OFFICE O/P EST LOW 20 MIN: CPT | Performed by: NURSE PRACTITIONER

## 2021-06-22 PROCEDURE — 3017F COLORECTAL CA SCREEN DOC REV: CPT | Performed by: NURSE PRACTITIONER

## 2021-06-22 PROCEDURE — G8417 CALC BMI ABV UP PARAM F/U: HCPCS | Performed by: NURSE PRACTITIONER

## 2021-06-22 PROCEDURE — G8427 DOCREV CUR MEDS BY ELIG CLIN: HCPCS | Performed by: NURSE PRACTITIONER

## 2021-06-22 RX ORDER — TIZANIDINE 4 MG/1
4 TABLET ORAL NIGHTLY PRN
Qty: 30 TABLET | Refills: 0 | Status: SHIPPED | OUTPATIENT
Start: 2021-06-22 | End: 2021-07-26 | Stop reason: SDUPTHER

## 2021-06-22 RX ORDER — OXYCODONE HYDROCHLORIDE AND ACETAMINOPHEN 5; 325 MG/1; MG/1
1 TABLET ORAL 2 TIMES DAILY PRN
Qty: 35 TABLET | Refills: 0 | Status: SHIPPED | OUTPATIENT
Start: 2021-07-01 | End: 2021-07-06 | Stop reason: SDUPTHER

## 2021-06-22 RX ORDER — GABAPENTIN 600 MG/1
600 TABLET ORAL 2 TIMES DAILY
Qty: 60 TABLET | Refills: 0 | Status: SHIPPED | OUTPATIENT
Start: 2021-06-26 | End: 2021-07-26 | Stop reason: SDUPTHER

## 2021-06-22 ASSESSMENT — ENCOUNTER SYMPTOMS
CONSTIPATION: 0
COUGH: 0
BACK PAIN: 1
SHORTNESS OF BREATH: 0
GASTROINTESTINAL NEGATIVE: 1
DIARRHEA: 0
EYES NEGATIVE: 1
TROUBLE SWALLOWING: 0

## 2021-06-22 NOTE — PROGRESS NOTES
Joyce Bauer  (1960)    2021    Subjective:     Joyce Bauer is 57 y.o. female who complains today of:    Chief Complaint   Patient presents with    Back Pain         Allergies:  Patient has no known allergies. Past Medical History:   Diagnosis Date    Hypertension      Past Surgical History:   Procedure Laterality Date    KNEE ARTHROSCOPY Left 2018     Family History   Problem Relation Age of Onset    Diabetes Mother     Breast Cancer Neg Hx     Cancer Neg Hx     Colon Cancer Neg Hx     Eclampsia Neg Hx     Hypertension Neg Hx     Ovarian Cancer Neg Hx      Labor Neg Hx     Spont Abortions Neg Hx     Stroke Neg Hx      Social History     Socioeconomic History    Marital status:      Spouse name: Not on file    Number of children: Not on file    Years of education: Not on file    Highest education level: Not on file   Occupational History    Not on file   Tobacco Use    Smoking status: Never Smoker    Smokeless tobacco: Never Used   Substance and Sexual Activity    Alcohol use: No    Drug use: No    Sexual activity: Yes   Other Topics Concern    Not on file   Social History Narrative    Not on file     Social Determinants of Health     Financial Resource Strain:     Difficulty of Paying Living Expenses:    Food Insecurity:     Worried About Running Out of Food in the Last Year:     Ran Out of Food in the Last Year:    Transportation Needs:     Lack of Transportation (Medical):      Lack of Transportation (Non-Medical):    Physical Activity:     Days of Exercise per Week:     Minutes of Exercise per Session:    Stress:     Feeling of Stress :    Social Connections:     Frequency of Communication with Friends and Family:     Frequency of Social Gatherings with Friends and Family:     Attends Denominational Services:     Active Member of Clubs or Organizations:     Attends Club or Organization Meetings:     Marital Status:    Intimate Partner Violence:     Fear of Current or Ex-Partner:     Emotionally Abused:     Physically Abused:     Sexually Abused:        Current Outpatient Medications on File Prior to Visit   Medication Sig Dispense Refill    diclofenac sodium (VOLTAREN) 1 % GEL Apply 4 g topically 2 times daily 100 g 0    norethindrone-ethinyl estradiol (JINTELI) 1-5 MG-MCG TABS per tablet TAKE ONE TABLET BY MOUTH EVERY DAY 84 tablet 3    atorvastatin (LIPITOR) 20 MG tablet TAKE ONE TABLET BY MOUTH EVERY DAY  5    lisinopril-hydrochlorothiazide (PRINZIDE;ZESTORETIC) 20-12.5 MG per tablet Take 1 tablet by mouth      naloxone 4 MG/0.1ML LIQD nasal spray 1 spray by Nasal route as needed for Opioid Reversal (Patient not taking: Reported on 6/22/2021) 1 each 0    nabumetone (RELAFEN) 500 MG tablet Take 1 tablet by mouth 2 times daily Take with food, avoid other NSAIDs (Ibuprofen, Diclofenac) and steroids 60 tablet 0    meloxicam (MOBIC) 15 MG tablet Take 1 tablet by mouth daily Take with food, avoid other NSAIDs (Ibuprofen) and steroids 30 tablet 0     No current facility-administered medications on file prior to visit. Pt presents today for a f/u of her pain. PCP is Dr. Nina Armendariz MD. Pt last saw Thang Hurley CNP for chronic knee and low back pain. She is considering medical marijuana but says she doesn't want to smoke anything. Doing well after a left total knee arthroplasty done by Dr. Miah Cedillo for Orthopedics, on 03/08/2021. Had follow-up appointment with Dr. Lauren Taylor on May 12, 2021 and he said her left knee replacement is doing well, the right knee was injected with steroid but it does not need any surgery anytime soon. She also had a follow-up with Dr. Lauren Taylor and he referred her to physical therapy for her low back. No further back surgery needed at this time. She has a history of lumbar spine fusion with Dr. Lauren Taylor in May of 2020. It has been a constant ache for over two years.   There are no other  Mild right moderate left medial joint space reduction.  No fracture.    X-ray lumbar spine 8/26/19: Scoliosis.  39°.  No fracture. EMG B LE 12/5/19: This study is limited, but abnormal: There is limited electrodiagnostic evidence for a Left L5 lumbosacral motor radiculopathy. There is minimal denervation and a limited distribution of muscles involved on electromyography on today's study. There is electrodiagnostic evidence for a non-localized Right peroneal motor mononeuropathy. There is no conduction velocity slowing across the fibular head on multiple nerve conduction studies nor is there any active denervation on electromyography. 3. There is no current evidence for a generalized large fiber sensorimotor peripheral polyneuropathy.   EMG B LE 9/5/19: This study is limited, but abnormal: There is limited electrodiagnostic evidence for a non-localized Right peroneal mononeuropathy. There is no active denervation on electromyography. Although limited, there is no clear evidence for an active lumbosacral motor radiculopathy or generalized large fiber sensorimotor peripheral polyneuropathy.   XR LS Spine 8/21/17: thoracolumbar scoliosis. degenerative disc disease. Lumbar facet arthropathy and SI joints. No fracture. EMG B LE 9/1317: This study is borderline, but normal. There is no clear electrodiagnostic evidence for an active lumbosacral motor radiculopathy as clinically questioned. There is no evidence for a generalized large fiber sensorimotor peripheral polyneuropathy. MRI lumbar spine 7/28/17: Levo curvature lumbar spine dextro curvature thoracic spine.  Degenerative disc disease.  L5-S1 disc bulge, left disc bulge, facet changes, mild canal narrowing, severe left normal right foraminal narrowing.  L4-5 disc bulge, facet changes, mild canal narrowing, mild narrowing foramina bilaterally.   L3-4 disc bulge, facet changes, mild canal narrowing, moderate right and mild left foraminal narrowing.  L2-3 disc bulge, facet changes, mild canal narrowing, mild right foraminal normal left foraminal narrowing.  L1-2 disc bulge, facet changes, normal canal and foramen.  T12-L1 facet changes, disc bulge, normal canal, mild right foraminal narrowing.     UDS 31/17/0597:  Free of illicits, consistent with Percocet and Gabapentin. UDS 16/36/2131:  Free of illicits, consistent with Percocet.    UDS 1/13/20: +phentermine (weight loss supplement), +Gabapentin  Opioid risk tool score 1, low risk   Objective:     Vitals:  /79 (Site: Right Upper Arm, Position: Sitting, Cuff Size: Large Adult)   Pulse 82   Temp 97.3 °F (36.3 °C) (Infrared)   Ht 4' 10\" (1.473 m)   Wt 155 lb (70.3 kg)   BMI 32.40 kg/m² Pain Score:   5      Physical Exam  Vitals and nursing note reviewed. This is a pleasant female who answers questions appropriately and follows commands.  Pt is alert and oriented x 3.  Recent and remote memory is intact.  Mood and affect, judgement and insight are normal.  No signs of distress, no dyspnea or SOB noted. HEENT: PERRL.  Neck is supple, trachea midline.  No lymphadenopathy noted.  Decreased ROM with flexion and extension of low back.  Mild tenderness with palpation to lumbar spine with palpation, tightness appreciated over lower lumbar paraspinal muscles. Surgical scars low back and Lt knee.  Scoliosis noted. Negative SLR.  Tightness in both hamstrings noted. Strength is functional for ambulation. Cranial nerves II-XII are intact. Assessment:      Diagnosis Orders   1. Primary osteoarthritis of both knees  oxyCODONE-acetaminophen (PERCOCET) 5-325 MG per tablet   2. Lumbosacral spondylosis without myelopathy  gabapentin (NEURONTIN) 600 MG tablet    tiZANidine (ZANAFLEX) 4 MG tablet    oxyCODONE-acetaminophen (PERCOCET) 5-325 MG per tablet   3.  Chronic pain of left knee  gabapentin (NEURONTIN) 600 MG tablet    tiZANidine (ZANAFLEX) 4 MG tablet    oxyCODONE-acetaminophen (PERCOCET) 5-325 MG per tablet 4. Chronic, continuous use of opioids  oxyCODONE-acetaminophen (PERCOCET) 5-325 MG per tablet   5. Encounter for monitoring opioid maintenance therapy  oxyCODONE-acetaminophen (PERCOCET) 5-325 MG per tablet       Plan:     Periodic Controlled Substance Monitoring: Possible medication side effects, risk of tolerance/dependence & alternative treatments discussed., No signs of potential drug abuse or diversion identified. , Assessed functional status., Obtaining appropriate analgesic effect of treatment. (Daylin Roe, APRN - CNP)    Orders Placed This Encounter   Medications    gabapentin (NEURONTIN) 600 MG tablet     Sig: Take 1 tablet by mouth 2 times daily for 30 days. Dispense:  60 tablet     Refill:  0    tiZANidine (ZANAFLEX) 4 MG tablet     Sig: Take 1 tablet by mouth nightly as needed (pain)     Dispense:  30 tablet     Refill:  0    oxyCODONE-acetaminophen (PERCOCET) 5-325 MG per tablet     Sig: Take 1 tablet by mouth 2 times daily as needed for Pain for up to 30 days. #35 tabs for 30 days     Dispense:  35 tablet     Refill:  0     Reduce doses taken as pain becomes manageable       No orders of the defined types were placed in this encounter. Discussed options with the patient today. Anatomic model pathology was shown and reviewed with pt. we will try reducing her Percocet 5 mg x 10 pills to #35 to use less. She is considering medical marijuana consultation. We will continue the gabapentin 600 mg twice a day and her tizanidine nightly. Hold injections at this time. Discussed lower back stretching for some spasms exercises given and reviewed today discussed limiting extension stretches however due to discomfort. All questions were answered. Discussed home exercise program.  Relevant imaging and pain generators reviewed. Pt verbalized understanding and agrees with above plan. Pt has chronic pain. Utox reviewed and appropriate from February 2021. ORT score 1 - low risk.  Narcan Rx sent in the past.    Will continue medications for chronic pain that has been previously directed as they do help pt function with ADL and improve quality of life. Pt is aware goal is to use the least amount of medication possible to allow pt to function and help with quality of life as discussed in detail. Discussed ideal to keep MME below 50 which it is. Discussed proper disposal of narcotic medication. Advised to have medications in safe place and locked up/in lock box. Discussed possible risks of opiate medication with pt, including, but not limited to possible constipation, nausea or vomiting, sedation, urinary retention, dependence and possible addiction. Pt agrees to use medication as prescribed/as directed. Pt advised to not use opiates while driving or operating heavy equipment, or in situations where pt may harm him/herself or others. Pt is aware that while on narcotics, pt needs to be seen to reassess pain and reassess need for continued medication at that time. NDP reviewed. OARRS was reviewed. This NP saw pt under direct supervision of Dr. Madai Cowan. Follow up:  Return in about 5 weeks (around 7/27/2021) for review meds and reassess pain.     Adis Bailey, APRN - CNP

## 2021-06-22 NOTE — PATIENT INSTRUCTIONS
Patient Education        Back Stretches: Exercises  Introduction  Here are some examples of exercises for stretching your back. Start each exercise slowly. Ease off the exercise if you start to have pain. Your doctor or physical therapist will tell you when you can start these exercises and which ones will work best for you. How to do the exercises  Overhead stretch   1. Stand comfortably with your feet shoulder-width apart. 2. Looking straight ahead, raise both arms over your head and reach toward the ceiling. Do not allow your head to tilt back. 3. Hold for 15 to 30 seconds, then lower your arms to your sides. 4. Repeat 2 to 4 times. Side stretch   1. Stand comfortably with your feet shoulder-width apart. 2. Raise one arm over your head, and then lean to the other side. 3. Slide your hand down your leg as you let the weight of your arm gently stretch your side muscles. Hold for 15 to 30 seconds. 4. Repeat 2 to 4 times on each side. Press-up   1. Lie on your stomach, supporting your body with your forearms. 2. Press your elbows down into the floor to raise your upper back. As you do this, relax your stomach muscles and allow your back to arch without using your back muscles. As your press up, do not let your hips or pelvis come off the floor. 3. Hold for 15 to 30 seconds, then relax. 4. Repeat 2 to 4 times. Relax and rest   1. Lie on your back with a rolled towel under your neck and a pillow under your knees. Extend your arms comfortably to your sides. 2. Relax and breathe normally. 3. Remain in this position for about 10 minutes. 4. If you can, do this 2 or 3 times each day. Follow-up care is a key part of your treatment and safety. Be sure to make and go to all appointments, and call your doctor if you are having problems. It's also a good idea to know your test results and keep a list of the medicines you take. Where can you learn more? Go to [email or web address]://[email or web address].[employer]. org and sign in to your [employer] account. Enter H603 in the ChoiceMap box to learn more about \"Back Stretches: Exercises. \"     If you do not have an account, please click on the \"Sign Up Now\" link. Current as of: November 16, 2020               Content Version: 12.9  © 9519-2136 [employer]. Care instructions adapted under license by [facility] [place] ([facility]). If you have questions about a medical condition or this instruction, always ask your healthcare professional. [address] any warranty or liability for your use of this information.

## 2021-07-06 DIAGNOSIS — G89.29 CHRONIC PAIN OF LEFT KNEE: ICD-10-CM

## 2021-07-06 DIAGNOSIS — Z51.81 ENCOUNTER FOR MONITORING OPIOID MAINTENANCE THERAPY: ICD-10-CM

## 2021-07-06 DIAGNOSIS — M25.562 CHRONIC PAIN OF LEFT KNEE: ICD-10-CM

## 2021-07-06 DIAGNOSIS — F11.90 CHRONIC, CONTINUOUS USE OF OPIOIDS: ICD-10-CM

## 2021-07-06 DIAGNOSIS — M47.817 LUMBOSACRAL SPONDYLOSIS WITHOUT MYELOPATHY: ICD-10-CM

## 2021-07-06 DIAGNOSIS — M17.0 PRIMARY OSTEOARTHRITIS OF BOTH KNEES: ICD-10-CM

## 2021-07-06 DIAGNOSIS — Z79.891 ENCOUNTER FOR MONITORING OPIOID MAINTENANCE THERAPY: ICD-10-CM

## 2021-07-06 RX ORDER — OXYCODONE HYDROCHLORIDE AND ACETAMINOPHEN 5; 325 MG/1; MG/1
1 TABLET ORAL 2 TIMES DAILY PRN
Qty: 35 TABLET | Refills: 0 | Status: SHIPPED | OUTPATIENT
Start: 2021-07-06 | End: 2021-07-26 | Stop reason: SDUPTHER

## 2021-07-06 NOTE — TELEPHONE ENCOUNTER
-Resend Percocet 5/325 mg #35 (was sent for -, am resending as it ), -  -Keep follow up on     Controlled Substance Monitoring:    Acute and Chronic Pain Monitoring:   RX Monitoring 2021   Attestation -   Periodic Controlled Substance Monitoring Obtaining appropriate analgesic effect of treatment.    Chronic Pain > 50 MEDD -

## 2021-07-06 NOTE — TELEPHONE ENCOUNTER
Requested Prescriptions     Pending Prescriptions Disp Refills    oxyCODONE-acetaminophen (PERCOCET) 5-325 MG per tablet 35 tablet 0     Sig: Take 1 tablet by mouth 2 times daily as needed for Pain for up to 30 days. #35 tabs for 30 days       Patient last seen on:    Date of last surgery:  n/a  Date of last refill:    Pain level:  n/a  Patient complaining of:  Pt states she was not able to  her medication before she went on vacation. She went today and was told it was . She is asking for a new Rx to be sent.    Future appts:

## 2021-07-26 ENCOUNTER — OFFICE VISIT (OUTPATIENT)
Dept: PAIN MANAGEMENT | Age: 61
End: 2021-07-26
Payer: COMMERCIAL

## 2021-07-26 VITALS — HEIGHT: 58 IN | TEMPERATURE: 97 F | WEIGHT: 157 LBS | BODY MASS INDEX: 32.95 KG/M2

## 2021-07-26 DIAGNOSIS — M17.0 PRIMARY OSTEOARTHRITIS OF BOTH KNEES: ICD-10-CM

## 2021-07-26 DIAGNOSIS — G89.29 CHRONIC PAIN OF LEFT KNEE: ICD-10-CM

## 2021-07-26 DIAGNOSIS — F11.90 CHRONIC, CONTINUOUS USE OF OPIOIDS: ICD-10-CM

## 2021-07-26 DIAGNOSIS — M25.562 CHRONIC PAIN OF LEFT KNEE: ICD-10-CM

## 2021-07-26 DIAGNOSIS — Z51.81 ENCOUNTER FOR MONITORING OPIOID MAINTENANCE THERAPY: ICD-10-CM

## 2021-07-26 DIAGNOSIS — M47.817 LUMBOSACRAL SPONDYLOSIS WITHOUT MYELOPATHY: Primary | ICD-10-CM

## 2021-07-26 DIAGNOSIS — Z79.891 ENCOUNTER FOR MONITORING OPIOID MAINTENANCE THERAPY: ICD-10-CM

## 2021-07-26 PROCEDURE — 3017F COLORECTAL CA SCREEN DOC REV: CPT | Performed by: PHYSICAL MEDICINE & REHABILITATION

## 2021-07-26 PROCEDURE — 99214 OFFICE O/P EST MOD 30 MIN: CPT | Performed by: PHYSICAL MEDICINE & REHABILITATION

## 2021-07-26 PROCEDURE — 1036F TOBACCO NON-USER: CPT | Performed by: PHYSICAL MEDICINE & REHABILITATION

## 2021-07-26 PROCEDURE — G8417 CALC BMI ABV UP PARAM F/U: HCPCS | Performed by: PHYSICAL MEDICINE & REHABILITATION

## 2021-07-26 PROCEDURE — G8427 DOCREV CUR MEDS BY ELIG CLIN: HCPCS | Performed by: PHYSICAL MEDICINE & REHABILITATION

## 2021-07-26 RX ORDER — GABAPENTIN 600 MG/1
600 TABLET ORAL 2 TIMES DAILY
Qty: 60 TABLET | Refills: 0 | Status: SHIPPED | OUTPATIENT
Start: 2021-07-26 | End: 2021-08-25 | Stop reason: SDUPTHER

## 2021-07-26 RX ORDER — OXYCODONE HYDROCHLORIDE AND ACETAMINOPHEN 5; 325 MG/1; MG/1
1 TABLET ORAL 2 TIMES DAILY PRN
Qty: 35 TABLET | Refills: 0 | Status: SHIPPED | OUTPATIENT
Start: 2021-08-05 | End: 2021-08-24

## 2021-07-26 RX ORDER — TIZANIDINE 4 MG/1
4 TABLET ORAL NIGHTLY PRN
Qty: 30 TABLET | Refills: 0 | Status: SHIPPED | OUTPATIENT
Start: 2021-07-26 | End: 2021-08-24

## 2021-07-26 ASSESSMENT — ENCOUNTER SYMPTOMS
SHORTNESS OF BREATH: 0
DIARRHEA: 0
BACK PAIN: 1
CONSTIPATION: 0
NAUSEA: 0

## 2021-07-26 NOTE — PROGRESS NOTES
Alivia Navarrete  (1960)    7/26/2021    Subjective:     Alivia Navarrete is 57 y.o. female who complains today of:    Chief Complaint   Patient presents with    Back Pain     Seen by me 4/29/21, last seen 6/22/21: didn't get into med marijuana yet. Saw Dr Parrish Fuller for her lumbar spine update. Getting gel injections from [facility]. She had right knee viscosupplementation done as well as follow-up with orthopedic spine surgery Dr. Parrish Fuller, XR LS spine done, reviewed below. She states that she was in so much pain after her surgery that she wants to avoid any further low back or knee surgery. No other tests, therapy or updates from other physicians, no emergency room visits. Gabapentin 600 mg twice daily, tizanidine 4 mg once daily at bedtime, Percocet 5/325 daily help with remaining functional with chores, personal hygiene, remaining compliant with home exercise program, maintaining her quality of life, and performing activities of daily living. She obtains greater than 50% pain relief without any significant side effects. She is clear to avoid driving or operating heavy machinery or to perform any activities where she may harm herself or others while on pain medications. Low back pain is currently a 5/10. It gets up to an 7/10. The pain is located in both sides of her low back and into her low back. The pain is worse with bending and activity. The pain is better with rest and pain medicine. She has a history of lumbar spine fusion L5/S1 with Dr. Parrish Fuller in May of 2020. It has been a constant ache for over two years. There are no other associated symptoms or contextual factors. She denies any classic radicular symptoms, new weakness, saddle anesthesia, bowel or bladder dysfunction, or falls. Left knee pain 5/10. It gets up to a 6/10. Also with some pain in the right knee doing slightly better with right knee viscosupplementation.   She is healing after a left total knee arthroplasty with  Brenda. It has been a constant ache for years, slowly improving after surgery. Better with opioid pain medicine. There are no other associated symptoms or contextual factors. She denies any classic radicular symptoms, new weakness, saddle anesthesia, bowel or bladder dysfunction, or falls. Allergies:  Patient has no known allergies. Past Medical History:   Diagnosis Date    Hypertension      Past Surgical History:   Procedure Laterality Date    KNEE ARTHROSCOPY Left 2018     Family History   Problem Relation Age of Onset    Diabetes Mother     Breast Cancer Neg Hx     Cancer Neg Hx     Colon Cancer Neg Hx     Eclampsia Neg Hx     Hypertension Neg Hx     Ovarian Cancer Neg Hx      Labor Neg Hx     Spont Abortions Neg Hx     Stroke Neg Hx      Social History     Socioeconomic History    Marital status:      Spouse name: Not on file    Number of children: Not on file    Years of education: Not on file    Highest education level: Not on file   Occupational History    Not on file   Tobacco Use    Smoking status: Never Smoker    Smokeless tobacco: Never Used   Substance and Sexual Activity    Alcohol use: No    Drug use: No    Sexual activity: Yes   Other Topics Concern    Not on file   Social History Narrative    Not on file     Social Determinants of Health     Financial Resource Strain:     Difficulty of Paying Living Expenses:    Food Insecurity:     Worried About Running Out of Food in the Last Year:     Ran Out of Food in the Last Year:    Transportation Needs:     Lack of Transportation (Medical):      Lack of Transportation (Non-Medical):    Physical Activity:     Days of Exercise per Week:     Minutes of Exercise per Session:    Stress:     Feeling of Stress :    Social Connections:     Frequency of Communication with Friends and Family:     Frequency of Social Gatherings with Friends and Family:     Attends Denominational Services:     Active Member of Clubs or Organizations:     Attends Club or Organization Meetings:     Marital Status:    Intimate Partner Violence:     Fear of Current or Ex-Partner:     Emotionally Abused:     Physically Abused:     Sexually Abused:        Current Outpatient Medications on File Prior to Visit   Medication Sig Dispense Refill    naloxone 4 MG/0.1ML LIQD nasal spray 1 spray by Nasal route as needed for Opioid Reversal (Patient not taking: Reported on 6/22/2021) 1 each 0    diclofenac sodium (VOLTAREN) 1 % GEL Apply 4 g topically 2 times daily 100 g 0    norethindrone-ethinyl estradiol (JINTELI) 1-5 MG-MCG TABS per tablet TAKE ONE TABLET BY MOUTH EVERY DAY 84 tablet 3    nabumetone (RELAFEN) 500 MG tablet Take 1 tablet by mouth 2 times daily Take with food, avoid other NSAIDs (Ibuprofen, Diclofenac) and steroids 60 tablet 0    meloxicam (MOBIC) 15 MG tablet Take 1 tablet by mouth daily Take with food, avoid other NSAIDs (Ibuprofen) and steroids 30 tablet 0    atorvastatin (LIPITOR) 20 MG tablet TAKE ONE TABLET BY MOUTH EVERY DAY  5    lisinopril-hydrochlorothiazide (PRINZIDE;ZESTORETIC) 20-12.5 MG per tablet Take 1 tablet by mouth       No current facility-administered medications on file prior to visit. Back Pain  Pertinent negatives include no fever or headaches. Review of Systems   Constitutional: Negative for fever. HENT: Negative for hearing loss. Respiratory: Negative for shortness of breath. Gastrointestinal: Negative for constipation, diarrhea and nausea. Genitourinary: Negative for difficulty urinating. Musculoskeletal: Positive for back pain. Negative for neck pain. Skin: Negative for rash. Neurological: Negative for headaches. Hematological: Does not bruise/bleed easily. Psychiatric/Behavioral: Positive for sleep disturbance.          Objective:     Vitals:  Temp 97 °F (36.1 °C) (Infrared)   Ht 4' 10\" (1.473 m)   Wt 157 lb (71.2 kg)   BMI 32.81 kg/m² Pain Score: 5        Exam performed under coronavirus precautions  General: No acute distress  Eyes: No scleral icterus or lid lag appreciated bilaterally  ENMT: Moist mucous membranes. Hearing grossly intact bilaterally. No masses or lesions on ears or nose  Neck: Symmetric without any overt masses or lesions, trachea midline  Respiratory: Respirations are non-labored  Skin: Visualized skin is intact  Psych: Mood normal. Affect normal. Recent and remote memory intact. Judgment and insight normal.  Neurologic: Gait antalgic, no assistive devices for ambulation. Pt is alert and appropriately interactive. Pleasant and cooperative with history and exam. No signs of excessive sedation. Responds promptly and appropriately to questions asked. No aberrant behaviors appreciated. Sore left knee anterior from total knee arthroplasty   Resolved left L5 paresthesias  Tender lumbar paraspinals     Sensation grossly intact in both legs. Reflexes and strength functional for ambulation, no abnormal reflexes appreciated on exam today  Strength greater than 3/5 bilateral legs  Straight leg raise negative bilaterally.           Orthopedic surgery follow-up 7/21/2021: [facility] Picket right knee. Orthopedic surgery follow-up 5/14/2021: 1 year follow-up L5-S1 TLIF and laminectomy. \"She says she is only 5% better\". X-rays show 35 degree scoliosis and if scoliosis worsen of a fusion might be optimal option. The patient said absolutely not, Rosas Tomlin is never going to\"  [facility] Diffuse degenerative changes, MIS T LIF L5-S1 with good positioning of cage around screws and rods, no hardware failure, mild spondylolisthesis L4 and L5, 35 degree degenerative scoliosis thoracolumbar spine, no fractures, hips partially visualized some moderate bilateral hip arthritis. MRI LS-spine 02/12/2020:  Degenerative disc disease and facet arthropathy. T12 to L4 normal canal and foramen.   L4-5 mild canal stenosis, mild bilateral foraminal narrowing. L5-S1 moderate left foraminal narrowing, mild canal.    XR LS-spine 06/02/2020:  MSI TLIF L5-S1. Screws and cage in good position. Scoliosis above the level of fusion. No new fractures. No instability.  read Dr. Eron Godfrey. XR bilateral knees 10/01/2020:  Mild right moderate left medial joint space reduction. No fracture. X-ray lumbar spine 8/26/19: Scoliosis.  39°.  No fracture. EMG B LE 12/5/19: This study is limited, but abnormal: There is limited electrodiagnostic evidence for a Left L5 lumbosacral motor radiculopathy. There is minimal denervation and a limited distribution of muscles involved on electromyography on today's study. There is electrodiagnostic evidence for a non-localized Right peroneal motor mononeuropathy. There is no conduction velocity slowing across the fibular head on multiple nerve conduction studies nor is there any active denervation on electromyography. 3. There is no current evidence for a generalized large fiber sensorimotor peripheral polyneuropathy.   EMG B LE 9/5/19: This study is limited, but abnormal: There is limited electrodiagnostic evidence for a non-localized Right peroneal mononeuropathy. There is no active denervation on electromyography. Although limited, there is no clear evidence for an active lumbosacral motor radiculopathy or generalized large fiber sensorimotor peripheral polyneuropathy.   XR LS Spine 8/21/17: thoracolumbar scoliosis. degenerative disc disease. Lumbar facet arthropathy and SI joints. No fracture. EMG B LE 9/1317: This study is borderline, but normal. There is no clear electrodiagnostic evidence for an active lumbosacral motor radiculopathy as clinically questioned. There is no evidence for a generalized large fiber sensorimotor peripheral polyneuropathy.    MRI lumbar spine 7/28/17: Levo curvature lumbar spine dextro curvature thoracic spine.  Degenerative disc disease.  L5-S1 disc bulge, left disc bulge, facet changes, mild canal narrowing, severe left normal right foraminal narrowing.  L4-5 disc bulge, facet changes, mild canal narrowing, mild narrowing foramina bilaterally.  L3-4 disc bulge, facet changes, mild canal narrowing, moderate right and mild left foraminal narrowing.  L2-3 disc bulge, facet changes, mild canal narrowing, mild right foraminal normal left foraminal narrowing.  L1-2 disc bulge, facet changes, normal canal and foramen.  T12-L1 facet changes, disc bulge, normal canal, mild right foraminal narrowing.     UDS 77/52/3448:  Free of illicits, consistent with Percocet and Gabapentin. UDS 49/97/6880:  Free of illicits, consistent with Percocet. UDS 1/13/20: +phentermine (weight loss supplement), +Gabapentin  Opioid risk tool score 1, low risk   Assessment:      Diagnosis Orders   1. Lumbosacral spondylosis without myelopathy  oxyCODONE-acetaminophen (PERCOCET) 5-325 MG per tablet    gabapentin (NEURONTIN) 600 MG tablet    tiZANidine (ZANAFLEX) 4 MG tablet   2. Chronic pain of left knee  oxyCODONE-acetaminophen (PERCOCET) 5-325 MG per tablet    gabapentin (NEURONTIN) 600 MG tablet    tiZANidine (ZANAFLEX) 4 MG tablet   3. Chronic, continuous use of opioids  oxyCODONE-acetaminophen (PERCOCET) 5-325 MG per tablet   4. Encounter for monitoring opioid maintenance therapy  oxyCODONE-acetaminophen (PERCOCET) 5-325 MG per tablet   5. Primary osteoarthritis of both knees  oxyCODONE-acetaminophen (PERCOCET) 5-325 MG per tablet     -Never received Lidocaine. Gabapentin 600 mg cog dysfunction. Diclofenac 75mg BID, Nabumetone 500 mg BID, Cyclobenzaprine 10 mg BID, Meloxicam 15mg, Tramadol min relief.  Medrol short term relief.   Plan:     Periodic Controlled Substance Monitoring: Possible medication side effects, risk of tolerance/dependence & alternative treatments discussed., No signs of potential drug abuse or diversion identified. , Assessed functional status., Obtaining appropriate analgesic effect of treatment. Mike Linares MD)    Orders Placed This Encounter   Medications    oxyCODONE-acetaminophen (PERCOCET) 5-325 MG per tablet     Sig: Take 1 tablet by mouth 2 times daily as needed for Pain for up to 30 days. #35 tabs for 30 days     Dispense:  35 tablet     Refill:  0     Reduce doses taken as pain becomes manageable    gabapentin (NEURONTIN) 600 MG tablet     Sig: Take 1 tablet by mouth 2 times daily for 30 days. Dispense:  60 tablet     Refill:  0    tiZANidine (ZANAFLEX) 4 MG tablet     Sig: Take 1 tablet by mouth nightly as needed (pain)     Dispense:  30 tablet     Refill:  0       No orders of the defined types were placed in this encounter.    -Continue home exercise program.   -Hold Nabumetone 500 mg BID at this time  -Continue Gabapentin 600 mg BID #60 no ref start 7/26/2021    -Continue Tizanidine 4 mg qHS #30 no ref start 7/26/2021 Avoid all other muscle relaxers and/or sedating medicines. -Continue Percocet 5/325 mg Q12-24 hrs prn #35 no ref start 8/5-9/4/21. 4/29-5/29/21. [place] reviewed on 7/26/2021   -Naloxone prescribed on 7/26/2021 . MME less 8.75  -She is not interested in any interventions at this time  -UDS today. One percocet today, took 1 percocet yesterday. (Order printed after the patient left the office, UDS not done, not due to patient refusal, UDS order cancelled)      Controlled Substance Monitoring:    Acute and Chronic Pain Monitoring:   RX Monitoring 7/26/2021   Attestation -   Periodic Controlled Substance Monitoring Possible medication side effects, risk of tolerance/dependence & alternative treatments discussed. ;No signs of potential drug abuse or diversion identified. ;Assessed functional status. ;Obtaining appropriate analgesic effect of treatment. Chronic Pain > 50 MEDD -        Discussed the risks, side effects, and symptoms that would warrant urgent or emergent physician evaluation of all medications prescribed today.  Provided education and counseling morbidity, and mortality with poorly-managed or undiagnosed medical conditions and comorbidities. Emphasized the importance of timely medical evaluation and treatment as previously recommended by us or other medical professionals. Risks of not pursing these recommendations were emphasized.     Advised her that any lab testing, imaging, or other diagnostic test results are best discussed in person in the office so that we can provide a clear explanation of their significance and best treatment based upon these results. It is her responsibility to make and keep a follow up appointment to discuss these test results in person. She expressed complete understanding and agreement with the entire plan as outlined above. Portions of this note may have been typed, auto-populated, dictated or transcribed by voice recognition resulting in errors, omissions, or close substitutions which may be missed despite careful proofreading. Please contact the author for any questions or concerns. Follow up:  Return in about 1 month (around 8/26/2021) for reassessment of pain and symptoms.     Tamara Cordova MD

## 2021-08-24 ENCOUNTER — OFFICE VISIT (OUTPATIENT)
Dept: OBGYN CLINIC | Age: 61
End: 2021-08-24
Payer: COMMERCIAL

## 2021-08-24 VITALS
WEIGHT: 151 LBS | HEIGHT: 58 IN | SYSTOLIC BLOOD PRESSURE: 116 MMHG | DIASTOLIC BLOOD PRESSURE: 66 MMHG | BODY MASS INDEX: 31.7 KG/M2

## 2021-08-24 DIAGNOSIS — Z11.51 SCREENING FOR HUMAN PAPILLOMAVIRUS: ICD-10-CM

## 2021-08-24 DIAGNOSIS — Z01.419 WOMEN'S ANNUAL ROUTINE GYNECOLOGICAL EXAMINATION: Primary | ICD-10-CM

## 2021-08-24 DIAGNOSIS — F51.01 PRIMARY INSOMNIA: ICD-10-CM

## 2021-08-24 DIAGNOSIS — Z12.31 SCREENING MAMMOGRAM, ENCOUNTER FOR: ICD-10-CM

## 2021-08-24 PROCEDURE — 99396 PREV VISIT EST AGE 40-64: CPT | Performed by: OBSTETRICS & GYNECOLOGY

## 2021-08-24 RX ORDER — ZOLPIDEM TARTRATE 5 MG/1
5 TABLET ORAL NIGHTLY PRN
Qty: 30 TABLET | Refills: 0 | Status: SHIPPED | OUTPATIENT
Start: 2021-08-24 | End: 2021-08-30 | Stop reason: SDUPTHER

## 2021-08-24 ASSESSMENT — ENCOUNTER SYMPTOMS
CONSTIPATION: 0
NAUSEA: 0
EYES NEGATIVE: 1
BLOOD IN STOOL: 0
RESPIRATORY NEGATIVE: 1
ABDOMINAL DISTENTION: 0
DIARRHEA: 0
ABDOMINAL PAIN: 0
RECTAL PAIN: 0
ALLERGIC/IMMUNOLOGIC NEGATIVE: 1
ANAL BLEEDING: 0
VOMITING: 0

## 2021-08-24 ASSESSMENT — PATIENT HEALTH QUESTIONNAIRE - PHQ9
SUM OF ALL RESPONSES TO PHQ QUESTIONS 1-9: 0
2. FEELING DOWN, DEPRESSED OR HOPELESS: 0
SUM OF ALL RESPONSES TO PHQ9 QUESTIONS 1 & 2: 0
SUM OF ALL RESPONSES TO PHQ QUESTIONS 1-9: 0
SUM OF ALL RESPONSES TO PHQ QUESTIONS 1-9: 0
1. LITTLE INTEREST OR PLEASURE IN DOING THINGS: 0

## 2021-08-24 ASSESSMENT — VISUAL ACUITY: OU: 1

## 2021-08-24 NOTE — PROGRESS NOTES
Subjective:      Patient ID: Elaine Martino is a 57 y.o. female    Annual exam.  No PMB. No GYN complaints. Pap performed and screening mammogram ordered. Monthly SBE encouraged. Screening colonoscopy recommended per routine. Requesting refill on Ambien for insomnia. F/U annual exam or prn. Vitals:  /66   Ht 4' 10\" (1.473 m)   Wt 151 lb (68.5 kg)   BMI 31.56 kg/m²   Past Medical History:   Diagnosis Date    Hypertension      Past Surgical History:   Procedure Laterality Date    KNEE ARTHROSCOPY Left 08/06/2018    TOTAL KNEE ARTHROPLASTY Left 03/08/2021     Allergies:  Patient has no known allergies. Current Outpatient Medications   Medication Sig Dispense Refill    gabapentin (NEURONTIN) 600 MG tablet Take 1 tablet by mouth 2 times daily for 30 days. 60 tablet 0    diclofenac sodium (VOLTAREN) 1 % GEL Apply 4 g topically 2 times daily 100 g 0    atorvastatin (LIPITOR) 20 MG tablet TAKE ONE TABLET BY MOUTH EVERY DAY  5    lisinopril-hydrochlorothiazide (PRINZIDE;ZESTORETIC) 20-12.5 MG per tablet Take 1 tablet by mouth       No current facility-administered medications for this visit. Social History     Socioeconomic History    Marital status:      Spouse name: Not on file    Number of children: Not on file    Years of education: Not on file    Highest education level: Not on file   Occupational History    Not on file   Tobacco Use    Smoking status: Never Smoker    Smokeless tobacco: Never Used   Substance and Sexual Activity    Alcohol use: No    Drug use: No    Sexual activity: Yes   Other Topics Concern    Not on file   Social History Narrative    Not on file     Social Determinants of Health     Financial Resource Strain:     Difficulty of Paying Living Expenses:    Food Insecurity:     Worried About Running Out of Food in the Last Year:     [address] in the Last Year:    Transportation Needs:     Lack of Transportation (Medical):      Lack of Transportation (Non-Medical):    Physical Activity:     Days of Exercise per Week:     Minutes of Exercise per Session:    Stress:     Feeling of Stress :    Social Connections:     Frequency of Communication with Friends and Family:     Frequency of Social Gatherings with Friends and Family:     Attends Taoism Services:     Active Member of Clubs or Organizations:     Attends Club or Organization Meetings:     Marital Status:    Intimate Partner Violence:     Fear of Current or Ex-Partner:     Emotionally Abused:     Physically Abused:     Sexually Abused:      Family History   Problem Relation Age of Onset    Diabetes Mother     Breast Cancer Neg Hx     Cancer Neg Hx     Colon Cancer Neg Hx     Eclampsia Neg Hx     Hypertension Neg Hx     Ovarian Cancer Neg Hx      Labor Neg Hx     Spont Abortions Neg Hx     Stroke Neg Hx        Review of Systems   Constitutional: Negative. Negative for activity change, appetite change, chills, diaphoresis, fatigue, fever and unexpected weight change. HENT: Negative. Eyes: Negative. Respiratory: Negative. Cardiovascular: Negative. Gastrointestinal: Negative for abdominal distention, abdominal pain, anal bleeding, blood in stool, constipation, diarrhea, nausea, rectal pain and vomiting. Endocrine: Negative. Genitourinary: Negative for decreased urine volume, difficulty urinating, dyspareunia, dysuria, enuresis, flank pain, frequency, genital sores, hematuria, menstrual problem, pelvic pain, urgency, vaginal bleeding, vaginal discharge and vaginal pain. Musculoskeletal: Negative. Skin: Negative. Allergic/Immunologic: Negative. Neurological: Negative. Hematological: Negative. Psychiatric/Behavioral: Negative. Objective:     Physical Exam  Constitutional:       Appearance: She is well-developed. HENT:      Head: Normocephalic. Eyes:      General: Lids are normal. Vision grossly intact.    Neck: Thyroid: No thyromegaly. Cardiovascular:      Rate and Rhythm: Normal rate and regular rhythm. Heart sounds: Normal heart sounds. Pulmonary:      Effort: Pulmonary effort is normal. No respiratory distress. Breath sounds: Normal breath sounds. No wheezing or rales. Chest:      Chest wall: No tenderness. Breasts:         Right: Normal. No swelling, bleeding, inverted nipple, mass, nipple discharge, skin change or tenderness. Left: Normal. No swelling, bleeding, inverted nipple, mass, nipple discharge, skin change or tenderness. Abdominal:      General: There is no distension. Palpations: Abdomen is soft. There is no mass. Tenderness: There is no abdominal tenderness. There is no guarding or rebound. Hernia: No hernia is present. There is no hernia in the left inguinal area or right inguinal area. Genitourinary:     General: Normal vulva. Pubic Area: No rash. Labia:         Right: No rash, tenderness, lesion or injury. Left: No rash, tenderness, lesion or injury. Urethra: No prolapse, urethral swelling or urethral lesion. Vagina: Normal. No signs of injury and foreign body. No vaginal discharge, erythema, tenderness or bleeding. Cervix: No cervical motion tenderness, discharge or friability. Uterus: Not deviated, not enlarged, not fixed and not tender. Adnexa:         Right: No mass, tenderness or fullness. Left: No mass, tenderness or fullness. Rectum: Normal.   Musculoskeletal:         General: No tenderness. Normal range of motion. Cervical back: Normal range of motion and neck supple. Lymphadenopathy:      Cervical: No cervical adenopathy. Upper Body:      Right upper body: No supraclavicular or axillary adenopathy. Left upper body: No supraclavicular or axillary adenopathy. Lower Body: No right inguinal adenopathy. No left inguinal adenopathy.    Skin:     General: Skin is warm and dry.      Coloration: Skin is not pale. Findings: No erythema or rash. Neurological:      Mental Status: She is alert and oriented to person, place, and time. Psychiatric:         Behavior: Behavior normal.         Thought Content: Thought content normal.         Judgment: Judgment normal.         Assessment:       Diagnosis Orders   1. Women's annual routine gynecological examination  PAP SMEAR   2. Screening for human papillomavirus  PAP SMEAR   3. Screening mammogram, encounter for  LIDIA DIGITAL SCREEN W OR WO CAD BILATERAL   4. Primary insomnia          Plan:      Medications placedthis encounter:  No orders of the defined types were placed in this encounter. Orders placedthis encounter:  Orders Placed This Encounter   Procedures    LIDIA DIGITAL SCREEN W OR WO CAD BILATERAL     Standing Status:   Future     Standing Expiration Date:   8/24/2022    PAP SMEAR     Patient History:    No LMP recorded. Patient is postmenopausal.  OBGYN Status: Postmenopausal  Past Surgical History:  08/06/2018: KNEE ARTHROSCOPY; Left  Medications/Contraceptives Affecting Cytology     Estrogen Combinations Disp Start End     norethindrone-ethinyl estradiol (JINTELI) 1-5 MG-MCG TABS per tablet    84 tablet 5/19/2020     Sig: TAKE ONE TABLET BY MOUTH EVERY DAY        Social History    Tobacco Use      Smoking status: Never Smoker      Smokeless tobacco: Never Used       Standing Status:   Future     Standing Expiration Date:   8/24/2022     Order Specific Question:   Collection Type     Answer: Thin Prep     Order Specific Question:   Prior Abnormal Pap Test     Answer:   No     Order Specific Question:   Screening or Diagnostic     Answer:   Screening     Order Specific Question:   HPV Requested? Answer:   Yes     Order Specific Question:   High Risk Patient     Answer:   N/A         Follow up:  Return in about 1 year (around 8/24/2022) for Annual.   Repeat Annual GYN exam every 1 year.   Cervical Cytology exam starts age 24. If Negative Cytology;  follow-up screening per current guidelines. Mammograms yearly starting at age 36. Calcium and Vitamin D dosing reviewed ( age appropriate ). Colonoscopy and bone density screening discussed ( age appropriate ). Birth control and STD prevention discussed ( age appropriate ). Gardisil counseling completed for all patients ( age appropriate ). Routine health maintenance ( per PCP and guidelines ).

## 2021-08-24 NOTE — PROGRESS NOTES
The patient was asked if she would like a chaperone present for her intimate exam. She  Declined the chaperone.  Trudi Gonzalez CMA ([address] [place] [address])

## 2021-08-25 ENCOUNTER — OFFICE VISIT (OUTPATIENT)
Dept: PAIN MANAGEMENT | Age: 61
End: 2021-08-25
Payer: COMMERCIAL

## 2021-08-25 VITALS
SYSTOLIC BLOOD PRESSURE: 122 MMHG | WEIGHT: 151 LBS | HEIGHT: 58 IN | BODY MASS INDEX: 31.7 KG/M2 | TEMPERATURE: 96.7 F | DIASTOLIC BLOOD PRESSURE: 72 MMHG

## 2021-08-25 DIAGNOSIS — Z79.891 ENCOUNTER FOR MONITORING OPIOID MAINTENANCE THERAPY: ICD-10-CM

## 2021-08-25 DIAGNOSIS — Z51.81 ENCOUNTER FOR MONITORING OPIOID MAINTENANCE THERAPY: ICD-10-CM

## 2021-08-25 DIAGNOSIS — F11.90 CHRONIC, CONTINUOUS USE OF OPIOIDS: ICD-10-CM

## 2021-08-25 DIAGNOSIS — M47.817 LUMBOSACRAL SPONDYLOSIS WITHOUT MYELOPATHY: ICD-10-CM

## 2021-08-25 DIAGNOSIS — M17.0 PRIMARY OSTEOARTHRITIS OF BOTH KNEES: ICD-10-CM

## 2021-08-25 DIAGNOSIS — G89.29 CHRONIC PAIN OF LEFT KNEE: ICD-10-CM

## 2021-08-25 DIAGNOSIS — M25.562 CHRONIC PAIN OF LEFT KNEE: ICD-10-CM

## 2021-08-25 PROCEDURE — 99213 OFFICE O/P EST LOW 20 MIN: CPT | Performed by: NURSE PRACTITIONER

## 2021-08-25 PROCEDURE — G8427 DOCREV CUR MEDS BY ELIG CLIN: HCPCS | Performed by: NURSE PRACTITIONER

## 2021-08-25 PROCEDURE — 3017F COLORECTAL CA SCREEN DOC REV: CPT | Performed by: NURSE PRACTITIONER

## 2021-08-25 PROCEDURE — 1036F TOBACCO NON-USER: CPT | Performed by: NURSE PRACTITIONER

## 2021-08-25 PROCEDURE — G8417 CALC BMI ABV UP PARAM F/U: HCPCS | Performed by: NURSE PRACTITIONER

## 2021-08-25 RX ORDER — OXYCODONE HYDROCHLORIDE AND ACETAMINOPHEN 5; 325 MG/1; MG/1
1 TABLET ORAL DAILY PRN
Qty: 30 TABLET | Refills: 0 | Status: SHIPPED | OUTPATIENT
Start: 2021-09-04 | End: 2021-09-30 | Stop reason: SDUPTHER

## 2021-08-25 RX ORDER — OXYCODONE HYDROCHLORIDE AND ACETAMINOPHEN 5; 325 MG/1; MG/1
1 TABLET ORAL DAILY PRN
Qty: 30 TABLET | Refills: 0 | Status: SHIPPED | OUTPATIENT
Start: 2021-09-04 | End: 2021-08-25 | Stop reason: DRUGHIGH

## 2021-08-25 RX ORDER — GABAPENTIN 600 MG/1
600 TABLET ORAL 2 TIMES DAILY
Qty: 60 TABLET | Refills: 0 | Status: SHIPPED | OUTPATIENT
Start: 2021-09-03 | End: 2021-09-30 | Stop reason: SDUPTHER

## 2021-08-25 ASSESSMENT — ENCOUNTER SYMPTOMS
GASTROINTESTINAL NEGATIVE: 1
CONSTIPATION: 0
DIARRHEA: 0
SHORTNESS OF BREATH: 0
EYES NEGATIVE: 1
TROUBLE SWALLOWING: 0
COUGH: 0
BACK PAIN: 1

## 2021-08-25 NOTE — PROGRESS NOTES
Alexei Wolff  (1960)    2021    Subjective:     Alexei Wolff is 57 y.o. female who complains today of:    Chief Complaint   Patient presents with    Knee Pain    Back Pain         Allergies:  Patient has no known allergies. Past Medical History:   Diagnosis Date    Hypertension      Past Surgical History:   Procedure Laterality Date    KNEE ARTHROSCOPY Left 2018    TOTAL KNEE ARTHROPLASTY Left 2021     Family History   Problem Relation Age of Onset    Diabetes Mother     Breast Cancer Neg Hx     Cancer Neg Hx     Colon Cancer Neg Hx     Eclampsia Neg Hx     Hypertension Neg Hx     Ovarian Cancer Neg Hx      Labor Neg Hx     Spont Abortions Neg Hx     Stroke Neg Hx      Social History     Socioeconomic History    Marital status:      Spouse name: Not on file    Number of children: Not on file    Years of education: Not on file    Highest education level: Not on file   Occupational History    Not on file   Tobacco Use    Smoking status: Never Smoker    Smokeless tobacco: Never Used   Substance and Sexual Activity    Alcohol use: No    Drug use: No    Sexual activity: Yes   Other Topics Concern    Not on file   Social History Narrative    Not on file     Social Determinants of Health     Financial Resource Strain:     Difficulty of Paying Living Expenses:    Food Insecurity:     Worried About Running Out of Food in the Last Year:     Ran Out of Food in the Last Year:    Transportation Needs:     Lack of Transportation (Medical):      Lack of Transportation (Non-Medical):    Physical Activity:     Days of Exercise per Week:     Minutes of Exercise per Session:    Stress:     Feeling of Stress :    Social Connections:     Frequency of Communication with Friends and Family:     Frequency of Social Gatherings with Friends and Family:     Attends Gnosticist Services:     Active Member of Clubs or Organizations:     Attends Club or Organization Meetings:     Marital Status:    Intimate Partner Violence:     Fear of Current or Ex-Partner:     Emotionally Abused:     Physically Abused:     Sexually Abused:        Current Outpatient Medications on File Prior to Visit   Medication Sig Dispense Refill    zolpidem (AMBIEN) 5 MG tablet Take 1 tablet by mouth nightly as needed for Sleep for up to 14 days. 30 tablet 0    diclofenac sodium (VOLTAREN) 1 % GEL Apply 4 g topically 2 times daily 100 g 0    atorvastatin (LIPITOR) 20 MG tablet TAKE ONE TABLET BY MOUTH EVERY DAY  5    lisinopril-hydrochlorothiazide (PRINZIDE;ZESTORETIC) 20-12.5 MG per tablet Take 1 tablet by mouth       No current facility-administered medications on file prior to visit. Pt presents today for a f/u of her pain. PCP is Dr. Susy Molina MD.  Patient last saw Dr. Caitie Alonso on 7/26/2021. She did recently start medical marijuana and only got the topical cream for her leg. She doesn't want to smoke. She says she is going to take a class on this. She does feel the cream has helped. She has seen Dr. Jed Kay for her low back. Did XR evidently. Has gotten gel injections from , Dr. Jed Kay for her Rt knee. She is trying to avoid further surgery of back or knee. She said after surgery she had increased pain. Left total knee arthroplasty done by Dr. Kasandra Mccracken for Orthopedics, on 03/08/2021. Had follow-up appointment with Dr. Jed Kay on May 12, 2021 and he said her left knee replacement is doing well, the right knee was injected with steroid but it does not need any surgery anytime soon. She also had a follow-up with Dr. Jed Kay and he referred her to physical therapy for her low back. No further back surgery needed at this time. She has a history of lumbar spine fusion with Dr. Jed Kay in May of 2020. It has been a constant ache for over two years. There are no other associated symptoms or contextual factors.  She denies any classic radicular symptoms, new weakness, saddle anesthesia, bowel or bladder dysfunction, or falls. .      Patient takes Percocet 5 mg p.o. no only needing daily PRN pain. Stopped zanaflex 4 mg daily  But takes gabapentin 600 mg twice daily for pain    Pt feels pain level with her medication is 1/10, and 8/10 without medication. Pt feels that lifting, moving things, prolonged activity, weather change makes the pain worse, and medication, THC cream, getting off feet, hot tub with Epson salt or with baking soda and alcohol makes the pain better. Pt feels her medication helps   her function and improve her quality of life, specifically says allows to do ADL's and be more active. Pt denies radiating numbness and tingling. Denies recent falls, injuries or trauma. Pt denies new weakness. Pt reports PT has been done. Review of Systems   Constitutional: Negative. Negative for fatigue. HENT: Negative. Negative for trouble swallowing. Eyes: Negative. Respiratory: Negative for cough and shortness of breath. Cardiovascular: Negative for chest pain. Gastrointestinal: Negative. Negative for constipation and diarrhea. Endocrine: Negative. Genitourinary: Negative. Musculoskeletal: Positive for arthralgias and back pain. Negative for neck pain. Skin: Negative. Neurological: Negative for dizziness, weakness, numbness and headaches. Hematological: Negative. Psychiatric/Behavioral: Negative. Reviewed Dr. Pepper Romo notes and reports from 7/26/21:  Orthopedic surgery follow-up 7/21/2021: [facility] right knee. Orthopedic surgery follow-up 5/14/2021: 1 year follow-up L5-S1 TLIF and laminectomy. \"She says she is only 5% better\". X-rays show 35 degree scoliosis and if scoliosis worsen of a fusion might be optimal option. The patient said absolutely not, Corey Washington is never going to\"  XR LS spine [facility]   Diffuse degenerative changes, MIS T LIF L5-S1 with good positioning of cage around screws and rods, no hardware failure, mild spondylolisthesis L4 and L5, 35 degree degenerative scoliosis thoracolumbar spine, no fractures, hips partially visualized some moderate bilateral hip arthritis. MRI LS-spine 02/12/2020:  Degenerative disc disease and facet arthropathy.  T12 to L4 normal canal and foramen.  L4-5 mild canal stenosis, mild bilateral foraminal narrowing.  L5-S1 moderate left foraminal narrowing, mild canal.    XR LS-spine 06/02/2020:  MSI TLIF L5-S1.  Screws and cage in good position.  Scoliosis above the level of fusion.  No new fractures.  No instability.   read Dr. Angelo Baker.    XR bilateral knees 10/01/2020:  Mild right moderate left medial joint space reduction.  No fracture.    X-ray lumbar spine 8/26/19: Scoliosis.  39°.  No fracture. EMG B LE 12/5/19: This study is limited, but abnormal: There is limited electrodiagnostic evidence for a Left L5 lumbosacral motor radiculopathy. There is minimal denervation and a limited distribution of muscles involved on electromyography on today's study. There is electrodiagnostic evidence for a non-localized Right peroneal motor mononeuropathy. There is no conduction velocity slowing across the fibular head on multiple nerve conduction studies nor is there any active denervation on electromyography. 3. There is no current evidence for a generalized large fiber sensorimotor peripheral polyneuropathy.   EMG B LE 9/5/19: This study is limited, but abnormal: There is limited electrodiagnostic evidence for a non-localized Right peroneal mononeuropathy. There is no active denervation on electromyography. Although limited, there is no clear evidence for an active lumbosacral motor radiculopathy or generalized large fiber sensorimotor peripheral polyneuropathy.   XR LS Spine 8/21/17: thoracolumbar scoliosis. degenerative disc disease. Lumbar facet arthropathy and SI joints. No fracture. EMG B LE 9/1317: This study is borderline, but normal. There is no clear electrodiagnostic evidence for an active lumbosacral motor radiculopathy as clinically questioned. There is no evidence for a generalized large fiber sensorimotor peripheral polyneuropathy. MRI lumbar spine 7/28/17: Levo curvature lumbar spine dextro curvature thoracic spine.  Degenerative disc disease.  L5-S1 disc bulge, left disc bulge, facet changes, mild canal narrowing, severe left normal right foraminal narrowing.  L4-5 disc bulge, facet changes, mild canal narrowing, mild narrowing foramina bilaterally.  L3-4 disc bulge, facet changes, mild canal narrowing, moderate right and mild left foraminal narrowing.  L2-3 disc bulge, facet changes, mild canal narrowing, mild right foraminal normal left foraminal narrowing.  L1-2 disc bulge, facet changes, normal canal and foramen.  T12-L1 facet changes, disc bulge, normal canal, mild right foraminal narrowing.   -Never received Lidocaine. Gabapentin 600 mg cog dysfunction. Diclofenac 75mg BID, Nabumetone 500 mg BID, Cyclobenzaprine 10 mg BID, Meloxicam 15mg, Tramadol min relief.  Medrol short term relief.   UDS 84/17/5235:  Free of illicits, consistent with Percocet and Gabapentin. UDS 63/42/3594:  Free of illicits, consistent with Percocet.    UDS 1/13/20: +phentermine (weight loss supplement), +Gabapentin  Opioid risk tool score 1, low risk   Objective:     Vitals:  /72   Temp 96.7 °F (35.9 °C) (Infrared)   Ht 4' 10\" (1.473 m)   Wt 151 lb (68.5 kg)   BMI 31.56 kg/m² Pain Score:   5      Physical Exam  Vitals and nursing note reviewed. This is a pleasant female who answers questions appropriately and follows commands.  Pt is alert and oriented x 3.  Recent and remote memory is intact.  Mood and affect, judgement and insight are normal.  No signs of distress, no dyspnea or SOB noted.  HEENT: PERRL.  Neck is supple, trachea midline.  No lymphadenopathy noted.  Decreased ROM with flexion and extension of low back.  Mild others. Pt is aware that while on narcotics, pt needs to be seen to reassess pain and reassess need for continued medication at that time. NDP reviewed. OARRS was reviewed. This NP saw pt under direct supervision of Dr. Denver Peat. Follow up:  Return in about 5 weeks (around 9/29/2021) for review meds and reassess pain.     Vladimir Bailey, APRN - CNP

## 2021-08-30 ENCOUNTER — TELEPHONE (OUTPATIENT)
Dept: OBGYN CLINIC | Age: 61
End: 2021-08-30

## 2021-08-30 DIAGNOSIS — F51.01 PRIMARY INSOMNIA: ICD-10-CM

## 2021-08-30 RX ORDER — ZOLPIDEM TARTRATE 5 MG/1
5 TABLET ORAL NIGHTLY PRN
Qty: 30 TABLET | Refills: 0 | Status: SHIPPED | OUTPATIENT
Start: 2021-08-30 | End: 2022-03-01 | Stop reason: SDUPTHER

## 2021-08-30 NOTE — TELEPHONE ENCOUNTER
Pt is calling to see when will her prescription for Fito Kilgore be sent to her pharmacy.  Please advise

## 2021-09-30 ENCOUNTER — OFFICE VISIT (OUTPATIENT)
Dept: PAIN MANAGEMENT | Age: 61
End: 2021-09-30
Payer: COMMERCIAL

## 2021-09-30 VITALS
HEIGHT: 58 IN | BODY MASS INDEX: 32.12 KG/M2 | WEIGHT: 153 LBS | DIASTOLIC BLOOD PRESSURE: 80 MMHG | SYSTOLIC BLOOD PRESSURE: 120 MMHG | TEMPERATURE: 96.8 F

## 2021-09-30 DIAGNOSIS — M17.0 PRIMARY OSTEOARTHRITIS OF BOTH KNEES: ICD-10-CM

## 2021-09-30 DIAGNOSIS — M25.562 CHRONIC PAIN OF LEFT KNEE: ICD-10-CM

## 2021-09-30 DIAGNOSIS — F11.90 CHRONIC, CONTINUOUS USE OF OPIOIDS: ICD-10-CM

## 2021-09-30 DIAGNOSIS — M47.817 LUMBOSACRAL SPONDYLOSIS WITHOUT MYELOPATHY: ICD-10-CM

## 2021-09-30 DIAGNOSIS — Z79.891 ENCOUNTER FOR MONITORING OPIOID MAINTENANCE THERAPY: ICD-10-CM

## 2021-09-30 DIAGNOSIS — Z51.81 ENCOUNTER FOR MONITORING OPIOID MAINTENANCE THERAPY: ICD-10-CM

## 2021-09-30 DIAGNOSIS — G89.29 CHRONIC PAIN OF LEFT KNEE: ICD-10-CM

## 2021-09-30 PROCEDURE — 3017F COLORECTAL CA SCREEN DOC REV: CPT | Performed by: NURSE PRACTITIONER

## 2021-09-30 PROCEDURE — 1036F TOBACCO NON-USER: CPT | Performed by: NURSE PRACTITIONER

## 2021-09-30 PROCEDURE — 99213 OFFICE O/P EST LOW 20 MIN: CPT | Performed by: NURSE PRACTITIONER

## 2021-09-30 PROCEDURE — G8417 CALC BMI ABV UP PARAM F/U: HCPCS | Performed by: NURSE PRACTITIONER

## 2021-09-30 PROCEDURE — G8427 DOCREV CUR MEDS BY ELIG CLIN: HCPCS | Performed by: NURSE PRACTITIONER

## 2021-09-30 RX ORDER — OXYCODONE HYDROCHLORIDE AND ACETAMINOPHEN 5; 325 MG/1; MG/1
1 TABLET ORAL DAILY PRN
Qty: 30 TABLET | Refills: 0 | Status: SHIPPED | OUTPATIENT
Start: 2021-10-07 | End: 2021-11-06

## 2021-09-30 RX ORDER — GABAPENTIN 600 MG/1
600 TABLET ORAL 2 TIMES DAILY
Qty: 60 TABLET | Refills: 0 | Status: SHIPPED | OUTPATIENT
Start: 2021-09-30 | End: 2021-11-03 | Stop reason: SDUPTHER

## 2021-09-30 ASSESSMENT — ENCOUNTER SYMPTOMS
RESPIRATORY NEGATIVE: 1
BACK PAIN: 1
CONSTIPATION: 0
DIARRHEA: 0

## 2021-09-30 NOTE — PROGRESS NOTES
Patient: Cherie Harry  YOB: 1960  Date: 21        Subjective:     Cherie Harry is a 57 y.o. female who complains today of:    Chief Complaint   Patient presents with    Follow-up    Back Pain    Knee Pain         Allergies:  Patient has no known allergies. Past Medical History:   Diagnosis Date    Hypertension      Past Surgical History:   Procedure Laterality Date    KNEE ARTHROSCOPY Left 2018    TOTAL KNEE ARTHROPLASTY Left 2021     Family History   Problem Relation Age of Onset    Diabetes Mother     Breast Cancer Neg Hx     Cancer Neg Hx     Colon Cancer Neg Hx     Eclampsia Neg Hx     Hypertension Neg Hx     Ovarian Cancer Neg Hx      Labor Neg Hx     Spont Abortions Neg Hx     Stroke Neg Hx      Social History     Socioeconomic History    Marital status:      Spouse name: Not on file    Number of children: Not on file    Years of education: Not on file    Highest education level: Not on file   Occupational History    Not on file   Tobacco Use    Smoking status: Never Smoker    Smokeless tobacco: Never Used   Substance and Sexual Activity    Alcohol use: No    Drug use: No    Sexual activity: Yes   Other Topics Concern    Not on file   Social History Narrative    Not on file     Social Determinants of Health     Financial Resource Strain:     Difficulty of Paying Living Expenses:    Food Insecurity:     Worried About Running Out of Food in the Last Year:     Ran Out of Food in the Last Year:    Transportation Needs:     Lack of Transportation (Medical):      Lack of Transportation (Non-Medical):    Physical Activity:     Days of Exercise per Week:     Minutes of Exercise per Session:    Stress:     Feeling of Stress :    Social Connections:     Frequency of Communication with Friends and Family:     Frequency of Social Gatherings with Friends and Family:     Attends Yazidi Services:     Active Member of Clubs or any significant side effects. She is clear to avoid driving or operating heavy machinery or to perform any activities where she may harm herself or others while on pain medications.     Low back pain is currently a 6/10. It gets up to an 8/10. The pain is located in both sides of her low back and into her low back. The pain is worse with bending and activity. The pain is better with rest and pain medicine. She has a history of lumbar spine fusion with Dr. Abdulkadir Pavon in May of 2020. It has been a constant ache for over two years. There are no other associated symptoms or contextual factors. She denies any classic radicular symptoms, new weakness, saddle anesthesia, bowel or bladder dysfunction, or falls. Left knee pain 4/10. It gets up to a 6/10. Healing after a left total knee arthroplasty with Dr. Abdulkadir Pavon. It has been a constant ache for years, slowly improving after surgery. Better with pain medicine. There are no other associated symptoms or contextual factors. She denies any classic radicular symptoms, new weakness, saddle anesthesia, bowel or bladder dysfunction, or falls.          Back Pain  Pertinent negatives include no headaches, numbness or weakness. Review of Systems   Constitutional: Negative. Negative for activity change and unexpected weight change. HENT: Negative. Negative for hearing loss. Respiratory: Negative. Cardiovascular: Negative for leg swelling. Gastrointestinal: Negative for constipation and diarrhea. Genitourinary: Negative. Musculoskeletal: Positive for back pain. Negative for gait problem, joint swelling and neck pain. Skin: Negative for rash. Neurological: Negative for dizziness, weakness, numbness and headaches. Psychiatric/Behavioral: Negative. Negative for sleep disturbance.          Objective:     Vitals:  /80 (Site: Left Wrist, Position: Sitting)   Temp 96.8 °F (36 °C)   Ht 4' 10\" (1.473 m)   Wt 153 lb (69.4 kg)   BMI 31.98 kg/m² Pain Score:   6    Physical Exam  Vitals reviewed. Constitutional:       Appearance: She is well-developed. HENT:      Head: Normocephalic. Nose: Nose normal.   Pulmonary:      Effort: Pulmonary effort is normal.   Musculoskeletal:      Cervical back: Normal range of motion and neck supple. Lumbar back: Tenderness present. Decreased range of motion. Negative right straight leg raise test and negative left straight leg raise test.      Comments: Surgical scars to back   Lymphadenopathy:      Cervical: No cervical adenopathy. Skin:     General: Skin is warm and dry. Findings: No erythema or rash. Neurological:      Mental Status: She is alert and oriented to person, place, and time. Cranial Nerves: No cranial nerve deficit. Gait: Gait abnormal.      Deep Tendon Reflexes: Reflexes are normal and symmetric. Reflexes normal.      Comments: Walks without assistive device slightly flexed  Gets out of chair gingerly   Psychiatric:         Mood and Affect: Mood normal.         Behavior: Behavior normal.         Thought Content: Thought content normal.         Judgment: Judgment normal.            Assessment:        Diagnosis Orders   1. Lumbosacral spondylosis without myelopathy  gabapentin (NEURONTIN) 600 MG tablet    oxyCODONE-acetaminophen (PERCOCET) 5-325 MG per tablet   2. Chronic pain of left knee  gabapentin (NEURONTIN) 600 MG tablet    oxyCODONE-acetaminophen (PERCOCET) 5-325 MG per tablet   3. Chronic, continuous use of opioids  oxyCODONE-acetaminophen (PERCOCET) 5-325 MG per tablet   4. Encounter for monitoring opioid maintenance therapy  oxyCODONE-acetaminophen (PERCOCET) 5-325 MG per tablet   5.  Primary osteoarthritis of both knees  oxyCODONE-acetaminophen (PERCOCET) 5-325 MG per tablet       Plan:     Periodic Controlled Substance Monitoring: Possible medication side effects, risk of tolerance/dependence & alternative treatments discussed., No signs of potential drug abuse

## 2021-10-13 ENCOUNTER — HOSPITAL ENCOUNTER (OUTPATIENT)
Dept: WOMENS IMAGING | Age: 61
Discharge: HOME | End: 2021-10-15
Payer: COMMERCIAL

## 2021-10-13 VITALS — HEIGHT: 58 IN | BODY MASS INDEX: 31.98 KG/M2

## 2021-10-13 DIAGNOSIS — Z12.31 SCREENING MAMMOGRAM, ENCOUNTER FOR: ICD-10-CM

## 2021-10-13 PROCEDURE — 77063 BREAST TOMOSYNTHESIS BI: CPT

## 2021-11-03 ENCOUNTER — TELEPHONE (OUTPATIENT)
Dept: PAIN MANAGEMENT | Age: 61
End: 2021-11-03

## 2021-11-03 ENCOUNTER — OFFICE VISIT (OUTPATIENT)
Dept: PAIN MANAGEMENT | Age: 61
End: 2021-11-03
Payer: COMMERCIAL

## 2021-11-03 VITALS
WEIGHT: 156 LBS | BODY MASS INDEX: 32.75 KG/M2 | SYSTOLIC BLOOD PRESSURE: 150 MMHG | DIASTOLIC BLOOD PRESSURE: 70 MMHG | TEMPERATURE: 96.3 F | HEIGHT: 58 IN

## 2021-11-03 DIAGNOSIS — M47.817 LUMBOSACRAL SPONDYLOSIS WITHOUT MYELOPATHY: Primary | ICD-10-CM

## 2021-11-03 DIAGNOSIS — G89.29 CHRONIC PAIN OF LEFT KNEE: ICD-10-CM

## 2021-11-03 DIAGNOSIS — M48.061 FORAMINAL STENOSIS OF LUMBAR REGION: ICD-10-CM

## 2021-11-03 DIAGNOSIS — M25.562 CHRONIC PAIN OF LEFT KNEE: ICD-10-CM

## 2021-11-03 PROCEDURE — 99213 OFFICE O/P EST LOW 20 MIN: CPT | Performed by: NURSE PRACTITIONER

## 2021-11-03 PROCEDURE — 3017F COLORECTAL CA SCREEN DOC REV: CPT | Performed by: NURSE PRACTITIONER

## 2021-11-03 PROCEDURE — G8427 DOCREV CUR MEDS BY ELIG CLIN: HCPCS | Performed by: NURSE PRACTITIONER

## 2021-11-03 PROCEDURE — 1036F TOBACCO NON-USER: CPT | Performed by: NURSE PRACTITIONER

## 2021-11-03 PROCEDURE — G8484 FLU IMMUNIZE NO ADMIN: HCPCS | Performed by: NURSE PRACTITIONER

## 2021-11-03 PROCEDURE — G8417 CALC BMI ABV UP PARAM F/U: HCPCS | Performed by: NURSE PRACTITIONER

## 2021-11-03 RX ORDER — GABAPENTIN 600 MG/1
600 TABLET ORAL 2 TIMES DAILY
Qty: 60 TABLET | Refills: 0 | Status: SHIPPED | OUTPATIENT
Start: 2021-11-03 | End: 2022-03-23 | Stop reason: SDUPTHER

## 2021-11-03 ASSESSMENT — ENCOUNTER SYMPTOMS
RESPIRATORY NEGATIVE: 1
CONSTIPATION: 0
BACK PAIN: 1
DIARRHEA: 0

## 2021-11-03 NOTE — PROGRESS NOTES
Patient: Francisca Taveras  YOB: 1960  Date: 11/3/21        Subjective:     Francisca Taveras is a 57 y.o. female who complains today of:    Chief Complaint   Patient presents with    Follow-up    Back Pain         Allergies:  Patient has no known allergies. Past Medical History:   Diagnosis Date    Hypertension      Past Surgical History:   Procedure Laterality Date    KNEE ARTHROSCOPY Left 2018    TOTAL KNEE ARTHROPLASTY Left 2021     Family History   Problem Relation Age of Onset    Diabetes Mother     Breast Cancer Neg Hx     Cancer Neg Hx     Colon Cancer Neg Hx     Eclampsia Neg Hx     Hypertension Neg Hx     Ovarian Cancer Neg Hx      Labor Neg Hx     Spont Abortions Neg Hx     Stroke Neg Hx      Social History     Socioeconomic History    Marital status:      Spouse name: Not on file    Number of children: Not on file    Years of education: Not on file    Highest education level: Not on file   Occupational History    Not on file   Tobacco Use    Smoking status: Never Smoker    Smokeless tobacco: Never Used   Substance and Sexual Activity    Alcohol use: No    Drug use: No    Sexual activity: Yes   Other Topics Concern    Not on file   Social History Narrative    Not on file     Social Determinants of Health     Financial Resource Strain:     Difficulty of Paying Living Expenses:    Food Insecurity:     Worried About Running Out of Food in the Last Year:     Ran Out of Food in the Last Year:    Transportation Needs:     Lack of Transportation (Medical):      Lack of Transportation (Non-Medical):    Physical Activity:     Days of Exercise per Week:     Minutes of Exercise per Session:    Stress:     Feeling of Stress :    Social Connections:     Frequency of Communication with Friends and Family:     Frequency of Social Gatherings with Friends and Family:     Attends Sikh Services:     Active Member of Clubs or Organizations:  Attends Club or Organization Meetings:     Marital Status:    Intimate Partner Violence:     Fear of Current or Ex-Partner:     Emotionally Abused:     Physically Abused:     Sexually Abused:        Current Outpatient Medications on File Prior to Visit   Medication Sig Dispense Refill    oxyCODONE-acetaminophen (PERCOCET) 5-325 MG per tablet Take 1 tablet by mouth daily as needed for Pain for up to 30 days. #30 tabs for 30 days 30 tablet 0    diclofenac sodium (VOLTAREN) 1 % GEL Apply 4 g topically 2 times daily 100 g 0    atorvastatin (LIPITOR) 20 MG tablet TAKE ONE TABLET BY MOUTH EVERY DAY  5    lisinopril-hydrochlorothiazide (PRINZIDE;ZESTORETIC) 20-12.5 MG per tablet Take 1 tablet by mouth       No current facility-administered medications on file prior to visit. Patient follows for chronic knee and low back pain. Back pain 6/10, knees not too bad. Had right knee injection with Dr. Glen Zaragoza a week or so ago. She does not want any knee surgery at this point. Random urine drug screen done 8/2021 positive for THC. Discussed with patient for past few months that she needs to choose between medical marijuana and narcotics. Each month she said she'd quit but Cleavon Alt shows she has been filling frequently. She has her medical marijuana card and uses Gummies. She would rather use her narcotic so said she will not use THC anymore, plus she also uses CBD with THC cream for her left knee. She said she will take the Percocet in the morning and then the marijuana the rest of the day. I told her I could not give her any more narcotics today. We will continue to give her gabapentin 600mg BID.      Doing well after a left total knee arthroplasty done by Dr. Uma Person for Orthopedics, on 03/08/2021.   Had follow-up appointment with Dr. Glen Zaragoza on May 12, 2021 and he said her left knee replacement is doing well, the right knee was injected with steroid but it does not need any surgery anytime soon.  She also had a follow-up with Dr. Concepcion Tamez and he referred her to physical therapy for her low back. No further back surgery needed at this time. Patient takes Percocet 5 mg p.o.daily as needed pain,  gabapentin 600 mg twice daily for pain. She also gets Ambien from Dr. Karly Asencio and will take a half for sleep as needed. She said she no longer uses phentermine. Medications help with remaining functional with chores, personal hygiene, remaining compliant with home exercise program, maintaining her quality of life, and performing activities of daily living. She obtains greater than 50% pain relief without any significant side effects. She is clear to avoid driving or operating heavy machinery or to perform any activities where she may harm herself or others while on pain medications.     Low back pain is currently a 6/10. It gets up to an 8/10. The pain is located in both sides of her low back and into her low back. The pain is worse with bending and activity. The pain is better with rest and pain medicine. She has a history of lumbar spine fusion with Dr. Concepcion Tamez in May of 2020. It has been a constant ache for over two years. There are no other associated symptoms or contextual factors. She denies any classic radicular symptoms, new weakness, saddle anesthesia, bowel or bladder dysfunction, or falls. Left knee pain 4/10. It gets up to a 6/10. Healing after a left total knee arthroplasty with Dr. Concepcion Tamez. It has been a constant ache for years, slowly improving after surgery. Better with pain medicine. There are no other associated symptoms or contextual factors. She denies any classic radicular symptoms, new weakness, saddle anesthesia, bowel or bladder dysfunction, or falls.          Back Pain  Pertinent negatives include no headaches, numbness or weakness. Review of Systems   Constitutional: Negative. Negative for activity change and unexpected weight change. HENT: Negative. Negative for hearing loss. Respiratory: Negative. Cardiovascular: Negative for leg swelling. Gastrointestinal: Negative for constipation and diarrhea. Genitourinary: Negative. Musculoskeletal: Positive for back pain. Negative for gait problem, joint swelling and neck pain. Skin: Negative for rash. Neurological: Negative for dizziness, weakness, numbness and headaches. Psychiatric/Behavioral: Negative. Negative for sleep disturbance. Objective:     Vitals:  BP (!) 150/70   Temp 96.3 °F (35.7 °C)   Ht 4' 10\" (1.473 m)   Wt 156 lb (70.8 kg)   BMI 32.60 kg/m² Pain Score:   4    Physical Exam  Vitals reviewed. Constitutional:       Appearance: She is well-developed. HENT:      Head: Normocephalic. Nose: Nose normal.   Pulmonary:      Effort: Pulmonary effort is normal.   Musculoskeletal:      Cervical back: Normal range of motion and neck supple. Lumbar back: Tenderness present. Decreased range of motion. Negative right straight leg raise test and negative left straight leg raise test.   Lymphadenopathy:      Cervical: No cervical adenopathy. Skin:     General: Skin is warm and dry. Findings: No erythema or rash. Neurological:      Mental Status: She is alert and oriented to person, place, and time. Cranial Nerves: No cranial nerve deficit. Deep Tendon Reflexes: Reflexes are normal and symmetric. Reflexes normal.   Psychiatric:         Mood and Affect: Mood normal.         Behavior: Behavior normal.         Thought Content: Thought content normal.         Judgment: Judgment normal.            Assessment:        Diagnosis Orders   1. Lumbosacral spondylosis without myelopathy  gabapentin (NEURONTIN) 600 MG tablet   2. Foraminal stenosis of lumbar region     3.  Chronic pain of left knee  gabapentin (NEURONTIN) 600 MG tablet       Plan:          Orders Placed This Encounter   Medications    gabapentin (NEURONTIN) 600 MG tablet     Sig: Take 1 tablet by mouth 2 times daily for 30 days. Dispense:  60 tablet     Refill:  0     -we will continue gabapentin 600mg BID if she chooses. We will not give nay more narcotics. Discussed with Dr Carmen Escamilla.  -She declines any procedures she follows with Dr. Kimberlee Cox for her back and Dr. Kimberlee Cox for her knees    No orders of the defined types were placed in this encounter. Discussed options with the patient today. Anatomic model pathology was shown and reviewed with pt.  All questions were answered.  Relevant imaging reviewed, NDP reviewed and pain generators reviewed. Pt verbalized understanding and agrees with above plan. Will continue medications as they do help pt function with ADL and improve quality of life.  Pt understands potential side effects from opioids such as constipation, dry mouth, dizziness, opioid use disorder, and overdose. Pt has failed non opioid therapy and decision was made to prescribe opioids to help with daily function and improve quality of life.     OARRS was reviewed. UTOX from 8/21U tox appropriate for oxycodone, negative for gabapentin.  Low levels of THC and alcohol noted.   ORT score = 1  Daily MME 7.5  Narcan prescribed 4/2021 and understands the proper use in the event of an overdose.      This NP saw pt under direct supervision of Dr Alvarado    Follow up:  Return if symptoms worsen or fail to improve.     KASANDRA Cohen - CNP

## 2022-01-21 ENCOUNTER — HOSPITAL ENCOUNTER (OUTPATIENT)
Dept: PHYSICAL THERAPY | Age: 62
Setting detail: THERAPIES SERIES
Discharge: HOME | End: 2022-01-21
Payer: COMMERCIAL

## 2022-01-21 ENCOUNTER — TELEPHONE (OUTPATIENT)
Dept: PAIN MANAGEMENT | Age: 62
End: 2022-01-21

## 2022-01-21 DIAGNOSIS — M17.0 PRIMARY OSTEOARTHRITIS OF BOTH KNEES: ICD-10-CM

## 2022-01-21 DIAGNOSIS — M47.817 LUMBOSACRAL SPONDYLOSIS WITHOUT MYELOPATHY: Primary | ICD-10-CM

## 2022-01-21 PROCEDURE — 97162 PT EVAL MOD COMPLEX 30 MIN: CPT

## 2022-01-21 ASSESSMENT — PAIN - FUNCTIONAL ASSESSMENT: PAIN_FUNCTIONAL_ASSESSMENT: PREVENTS OR INTERFERES WITH ALL ACTIVE AND SOME PASSIVE ACTIVITIES

## 2022-01-21 ASSESSMENT — PAIN DESCRIPTION - DESCRIPTORS: DESCRIPTORS: ACHING;SHARP;THROBBING

## 2022-01-21 ASSESSMENT — PAIN DESCRIPTION - ONSET: ONSET: AWAKENED FROM SLEEP

## 2022-01-21 ASSESSMENT — PAIN DESCRIPTION - LOCATION: LOCATION: BACK;KNEE

## 2022-01-21 ASSESSMENT — PAIN DESCRIPTION - FREQUENCY: FREQUENCY: INTERMITTENT

## 2022-01-21 ASSESSMENT — PAIN DESCRIPTION - ORIENTATION: ORIENTATION: LEFT

## 2022-01-21 ASSESSMENT — PAIN SCALES - GENERAL: PAINLEVEL_OUTOF10: 7

## 2022-01-21 NOTE — PROGRESS NOTES
[address] [address]  [address]:[phone]    [] Certification  [] Recertification [x]  Plan of Care  [] Progress Note [] Discharge      To:  Dr Sundeep Roca, Dr Ar Ayala      From:  Rosy Miller, PT  Patient: Luis Angel Jerry     : 1960  Diagnosis: L knee OA, R knee pain and lumbar strain     Date: 2022  Treatment Diagnosis: LBP and R>L knee pain with functional limitations, decreased ROM and strength     Progress Report Period from:  2022  to 2022    Total # of Visits to Date: 1   No Show: 0    Canceled Appointment: 0     OBJECTIVE:   Short Term Goals - Time Frame for Short term goals: 2 weeks    Goals Current/Discharge status  Met   Short term goal 1: The pt will demonstrate improved postural awareness requiring <25% VC's throughout treatment  Observation: R knee flexed in stance, wt shift off R LE and guarded. [] yes  [x] no   Short term goal 2: Decrease R knee and Low back pain 50% to assist with improved functional gains. Pain Location: Back,Knee    Pain Level: 7 (R knee and back, left knee no pain)    Pain Descriptors: Aching,Sharp,Throbbing [] yes  [x] no     Long Term Goals - Time Frame for Long term goals : 4-6 weeks  Goals Current/ Discharge status Met   Long term goal 1: Indep HEP for symptom management Written HEP initiated  for symptom management  Needs progression for comprehensive program development. [] yes  [x] no   Long term goal 2: Pt demo improved overall function by reporting greater than 50% per functional survey score Exam: LEFS 16/80=20% functional and Mod Oswestry 28% functional   [] yes  [x] no   Long term goal 3: Pain-free R hip 70 deg, IR 18 deg, DF10 deg, R knee ext -8 deg  AROM to WNL allowing an increase in ADL tolerance and donning shoes. Tobias Records  RLE General PROM: Hip 70 with disomfort, 12IR, 30 ER  RLE General AROM: Hip flex 60, Knee-15-95, ankle DF 3 deg with discomfort   [] yes  [x] no   Long term goal 4: Improve R LE strength 4-/5 to 4/5 to allow patient to improved functional tolerance and Mod Indep with getting out of tub. Strength RLE  Comment: Hip 3+/5, knee 3+ to 4-/5, ankle 3+/5  Strength LLE  Comment: Hip 4 to 4+/5, knee 4+/5, ankle 4/5    [] yes  [x] no     Body structures, Functions, Activity limitations: Decreased functional mobility ,Decreased ADL status,Decreased ROM,Decreased strength,Decreased posture,Increased pain  Assessment: The pt's impairments currently limit functional abilities by 72-80% including her abilities to  don socks and shoes, transfer out of tub, perform recreational activities such as walking, and perform household/work related duties without pain or limitations. Skilled PT required to address about deficits to improve over function and return to prior level of function. Prognosis: Good  Discharge Recommendations: Continue to assess pending progress    Special Tests: Lumbar SLUMP (-)  SLR(-) ELIZABETH (tightness B), ELY's (), Crossed SLR (-), Scour (+) R), Distraction(decrease pain r>L LE), compression (-)     PT Education: Goals;PT Role;Plan of Care    PLAN: [x] Evaluate and Treat  Frequency/Duration:  Plan  Times per week: 2  Plan weeks: 4-6  Current Treatment Recommendations: Strengthening,ROM,Home Exercise Program,Manual Therapy - Soft Tissue Mobilization,Neuromuscular Re-education,Modalities,Functional Mobility Training,Gait Training,Stair training,Balance Training                             Patient Status:[x] Continue/ Initiate plan of Care    [] Discharge PT. Recommend pt continue with HEP. [] Additional visits requested, Please re-certify for additional visits:          Signature: Electronically signed by Windy Almeida PT on 1/21/22 at 12:24 PM EST      If you have any questions or concerns, please don't hesitate to call.   Thank you for your referral.    I have reviewed this plan of care and certify a need for medically necessary rehabilitation services.     Physician Signature:__________________________________________________________  Date:  Please sign and return

## 2022-01-21 NOTE — PROGRESS NOTES
[facility]-  [place]  PHYSICAL THERAPY EVALUATION    Date: 2022  Patient Name: Manuel Torres       MRN: [id number]   Account: [de-identified]   : 1960  (57 y.o.)   Gender: female   Referring Practitioner: Dr Verner Pickles, Dr Kelsie Jacobson                 Diagnosis: L knee OA, R knee pain and lumbar strain  Treatment Diagnosis: LBP and R>L knee pain with functional limitations, decreased ROM and strength  Additional Pertinent Hx: L TKR 3/2021 and back surgery fusion with pepito 2021, no surgeries on R knee      Past Medical History:  has a past medical history of Hypertension. Past Surgical History:   has a past surgical history that includes Knee arthroscopy (Left, 2018) and Total knee arthroplasty (Left, 2021). Vital Signs  Patient Currently in Pain: Yes   Pain Screening  Patient Currently in Pain: Yes  Pain Assessment  Pain Assessment: 0-10  Pain Level: 7 (R knee and back, left knee no pain)  Pain Location: Back;Knee  Pain Orientation: Left  Pain Radiating Towards: Low Back and R LE pain no NT reported. Pain Descriptors: Aching; Clare Sees; Throbbing  Pain Frequency: Intermittent  Pain Onset: Awakened from sleep  Functional Pain Assessment: Prevents or interferes with all active and some passive activities (Pt reports functioning at 25%  was functioning less before the knee surgery)  Non-Pharmaceutical Pain Intervention(s): Cold applied           Lives With: Spouse  Home Layout: One level  Home Access: Stairs to enter with rails  Entrance Stairs - Number of Steps: 1 non recip  Bathroom Shower/Tub: Tub only  Home Equipment: Cane  ADL Assistance: Needs assistance ( helps with socks and shoes and getting out of tub)  Homemaking Assistance: Independent  Homemaking Responsibilities: Yes  Ambulation Assistance: Independent  Transfer Assistance: Independent  Active : Yes  Mode of Transportation: Car  Occupation: Retired  Leisure & Hobbies: walk daily however hasnt been able to Subjective:  Subjective: Pt saw doctor Samantha Moreno for knees back in September and was referred to therapy however was not a good time to start per patient. Pt reports Dr Josefa Romero referring to therapy for her back. Pt reports recent visit to ER last week requesting injections and was given a steroid and another injection with much relief reported. Manages pain with tylenol and advil currently. Uses cream on knees and back. Pt reports not as clear headed today and had to correct some information throughout evaluation. Comments: RTD pt not sure. Follows up with Dr Josefa Romero in 2 weeks. Objective:      Balance  Sitting - Static: Good  Sitting - Dynamic: Good  Standing - Static: Good  Single Leg Stance R Leg: 3  Single Leg Stance L Le    Ambulation 1  Surface: carpet  Device: No Device  Assistance: Modified Independent  Quality of Gait: decreased stance time on R LE. Gait Deviations: Decreased step height,Decreased step length,Slow Mariella  Distance: 50'        Transfers  Sit to Stand: Independent  Stand to sit: Independent    Strength RLE  Comment: Hip 3+/5, knee 3+ to 4-/5, ankle 3+/5  Strength LLE  Comment: Hip 4 to 4+/5, knee 4+/5, ankle 4/5     PROM RLE (degrees)  RLE General PROM: Hip 70 with disomfort, 12IR, 30 ER  AROM RLE (degrees)  RLE General AROM: Hip flex 60, Knee-15-95, ankle DF 3 deg with discomfort  PROM LLE (degrees)  LLE General PROM: Hip flex 90, 20 Inve, 28, EV  AROM LLE (degrees)  LLE General AROM: Hip flex 80, 0-90 knee, DF 10 deg     Spine  Lumbar: FLex WNL Ext 50%, SB B 50% with min discomfort. Special Tests: Lumbar SLUMP (-)  SLR(-) ELIZABETH (tightness B), ELY's (), Crossed SLR (-), Scour (+) R), Distraction(decrease pain r>L LE), compression (-)    Observation/Palpation  Posture: Fair  Palpation: No tenderness to palpation  Observation: R knee flexed in stance, wt shift off R LE and guarded.   Bed mobility  Supine to Sit: Independent  Sit to Supine: Independent          Additional Measures  Flexibility: Hamsting flexibility -20°R, -15°L at 90/90 hip/knee position. Exercises:   Exercises  Exercise 2: Prone knee ext stretch R*  Exercise 3: ham stretch*  Exercise 4: hip add with ball*  Exercise 5: hip abd with band*  Exercise 6: seated ham stretch*  Exercise 7: modified piriformis stretch*  Exercise 8: SKTC*  Exercise 9: LTR*  Exercise 10: R gastroc stretch*  Exercise 11: Resisted ham curls*  Exercise 20: HEP: to be intiated  Modalities:  Modalities  E-stim (parameters): *R knee, low back  Manual:  Manual therapy  PROM: R hip flex*  Manual traction: R LE*  Other: KT tape R knee for support*  *Indicates exercise,modality, or manual techniques to be initiated when appropriate  Assessment: Body structures, Functions, Activity limitations: Decreased functional mobility ,Decreased ADL status,Decreased ROM,Decreased strength,Decreased posture,Increased pain  Assessment: The pt's impairments currently limit functional abilities by 72-80% including her abilities to  don socks and shoes, transfer out of tub, perform recreational activities such as walking, and perform household/work related duties without pain or limitations. Skilled PT required to address about deficits to improve over function and return to prior level of function.   Prognosis: Good  Discharge Recommendations: Continue to assess pending progress  Activity Tolerance: Patient Tolerated treatment well;Patient limited by fatigue;Patient limited by pain     Decision Making: Medium Complexity  History: Med PMHX  Exam: LEFS 16/80=20% functional and Mod Oswestry 28% functional  Clinical Presentation: evolving        Plan  Frequency/Duration:  Plan  Times per week: 2  Plan weeks: 4-6  Current Treatment Recommendations: Strengthening,ROM,Home Exercise Program,Manual Therapy - Soft Tissue Mobilization,Neuromuscular Re-education,Modalities,Functional Mobility Training,Gait Training,Stair training,Balance Training         Patient Education  New Education Provided: PT Education: Goals;PT Role;Plan of Care    POST-PAIN     Pain Rating (0-10 pain scale): No change/10  Location and pain description same as pre-treatment unless indicated. Action: [x] NA  [] Call Physician  [] Perform HEP  [] Meds as prescribed    Evaluation and patient rights have been reviewed and patient agrees with plan of care. Yes  [x]  No  []   Explain:       Vasquez Fall Risk Assessment  Risk Factor Scale  Score   History of Falls [] Yes  [x] No 25  0 0   Secondary Diagnosis [] Yes  [x] No 15  0 0   Ambulatory Aid [] Furniture  [] Crutches/cane/walker  [x] None/bedrest/wheelchair/nurse 30  15  0 0   IV/Heparin Lock [] Yes  [x] No 20  0 0   Gait/Transferring [] Impaired  [x] Weak  [] Normal/bedrest/immobile 20  10  0 10   Mental Status [] Forgets limitations  [x] Oriented to own ability 15  0 0      Total:10     Based on the Assessment score: check the appropriate box. [x]  No intervention needed   Low =   Score of 0-24  []  Use standard prevention interventions Moderate =  Score of 24-44   [] Discuss fall prevention strategies   [] Indicate moderate falls risk on eval  []  Use high risk prevention interventions High = Score of 45 and higher   [] Discuss fall prevention strategies   [] Provide supervision during treatment time    Goals  Short term goals  Time Frame for Short term goals: 2 weeks  Short term goal 1: The pt will demonstrate improved postural awareness requiring <25% VC's throughout treatment  Short term goal 2: Decrease R knee and Low back pain 50% to assist with improved functional gains. Long term goals  Time Frame for Long term goals : 4-6 weeks  Long term goal 1: Indep HEP for symptom management  Long term goal 2: Pt demo improved overall function by reporting greater than 50% per functional survey score  Long term goal 3: Pain-free R hip 70 deg, IR 18 deg, DF10 deg, R knee ext -8 deg  AROM to WNL allowing an increase in ADL tolerance and donning shoes.  .  Long term goal 4: Improve R LE strength 4-/5 to 4/5 to allow patient to improved functional tolerance and Mod Indep with getting out of tub.     PT Individual Minutes  Time In: 0908  Time Out: 9533  Minutes: 42     Procedure Minutes:42 min Eval    Electronically signed by Yolie Zhong PT on 1/21/22 at 12:44 PM EST

## 2022-01-24 ENCOUNTER — HOSPITAL ENCOUNTER (OUTPATIENT)
Dept: PHYSICAL THERAPY | Age: 62
Setting detail: THERAPIES SERIES
Discharge: HOME | End: 2022-01-24
Payer: COMMERCIAL

## 2022-01-24 PROCEDURE — 97110 THERAPEUTIC EXERCISES: CPT

## 2022-01-24 PROCEDURE — G0283 ELEC STIM OTHER THAN WOUND: HCPCS

## 2022-01-24 PROCEDURE — 97150 GROUP THERAPEUTIC PROCEDURES: CPT

## 2022-01-24 ASSESSMENT — PAIN DESCRIPTION - ORIENTATION: ORIENTATION: LEFT

## 2022-01-24 ASSESSMENT — PAIN DESCRIPTION - DESCRIPTORS: DESCRIPTORS: ACHING;SHARP;THROBBING

## 2022-01-24 ASSESSMENT — PAIN SCALES - GENERAL: PAINLEVEL_OUTOF10: 6

## 2022-01-24 ASSESSMENT — PAIN DESCRIPTION - LOCATION: LOCATION: BACK;KNEE

## 2022-01-24 NOTE — PROGRESS NOTES
[address] [address]  [address]  JUSPK:[phone]        Date: 2022  Patient: Payton Chamorro  : 1960  ACCT #: [de-identified]  Referring Practitioner: Dr Andres Reed, Dr Darby Simmons  Diagnosis: L knee OA, R knee pain and lumbar strain  Treatment Diagnosis: LBP and R>L knee pain with functional limitations, decreased ROM and strength    Visit Information:  PT Visit Information  Onset Date: 22 (surgery done in May pt not aware of exact date.)  PT Insurance Information: [employer]  Total # of Visits Approved:  (48 units till 3/4/22)  Total # of Visits to Date: 2  Plan of Care/Certification Expiration Date: 22  No Show: 0  Canceled Appointment: 0  Progress Note Counter: 3/48 units till 3/4/22    Subjective: Pt c/o 6/10 pain in her right knee and low back this morning. She is eager to start therapy as she believes the stretching will help. Comments: RTD pt not sure. Follows up with Dr Darby Simmons in 2 weeks. HEP Compliance:  [] Good [] Fair [] Poor [] Reports not doing due to:    Vital Signs  Patient Currently in Pain: Yes   Pain Screening  Patient Currently in Pain: Yes  Pain Assessment  Pain Assessment: 0-10  Pain Level: 6  Pain Location: Back;Knee  Pain Orientation: Left  Pain Descriptors: Aching; Jamey Busman; Throbbing    OBJECTIVE:   Exercises  Exercise 2: Prone knee ext stretch R with 5# x 2' (watch timer)  Exercise 3: ham stretch 5 x 20\"  Exercise 4: hip add with ball 15 x 5\"  Exercise 5: hip abd with blue tband 15 x 5\"  Exercise 6: seated ham stretch*  Exercise 7: modified piriformis stretch 5 x 20\"  Exercise 8: SKTC 5 x 20\"  Exercise 9: LTR 10 x 5\"  Exercise 10: R gastroc stretch*  Exercise 11: Resisted ham curls*  Exercise 20: HEP: to be intiated    Strength: [x] NT  [] MMT completed:    ROM: [x] NT  [] ROM measurements:    Modalities:  Modalities  E-stim (parameters): Premod to R knee, low back in seated to decreasse pain x 10'     *Indicates exercise, modality, or manual techniques to be initiated when appropriate    Assessment: Body structures, Functions, Activity limitations: Decreased functional mobility ,Decreased ADL status,Decreased ROM,Decreased strength,Decreased posture,Increased pain  Assessment: Initiated lumbar/knee therex this date. Pt took instructions well with good tolerance to ex's. Good relief from E-stim tx and denied pain post.  Treatment Diagnosis: LBP and R>L knee pain with functional limitations, decreased ROM and strength  Prognosis: Good      Goals:  Short term goals  Time Frame for Short term goals: 2 weeks  Short term goal 1: The pt will demonstrate improved postural awareness requiring <25% VC's throughout treatment  Short term goal 2: Decrease R knee and Low back pain 50% to assist with improved functional gains. Long term goals  Time Frame for Long term goals : 4-6 weeks  Long term goal 1: Indep HEP for symptom management  Long term goal 2: Pt demo improved overall function by reporting greater than 50% per functional survey score  Long term goal 3: Pain-free R hip 70 deg, IR 18 deg, DF10 deg, R knee ext -8 deg  AROM to WNL allowing an increase in ADL tolerance and donning shoes. .  Long term goal 4: Improve R LE strength 4-/5 to 4/5 to allow patient to improved functional tolerance and Mod Indep with getting out of tub. Progress toward goals: Began treatment this date     POST-PAIN       Pain Rating (0-10 pain scale):   0/10   Location and pain description same as pre-treatment unless indicated. Action: [x] NA   [] Perform HEP  [] Meds as prescribed  [] Modalities as prescribed   [] Call Physician     Frequency/Duration:  Plan  Times per week: 2  Plan weeks: 4-6  Current Treatment Recommendations: Strengthening,ROM,Home Exercise Program,Manual Therapy - Soft Tissue Mobilization,Neuromuscular Re-education,Modalities,Functional Mobility Training,Gait Training,Stair training,Balance Training    Pt to continue current HEP.   See objective section for any therapeutic exercise changes, additions or modifications this date.     PT Individual Minutes  Time In: 3911  Time Out: 1108  Minutes: 60  PT Group Minutes  Time In: 1020  Time Out: 1045  Minutes: 25  Timed Code Treatment Minutes: 13 Minutes  Procedure Minutes: E-stim x 10'     Group (2) Ther-ex: 25 minutes    Timed Activity Minutes Units   Ther Ex 1:1 13 1       Signature:  Electronically signed by Crow Hernandes PTA on 1/24/22 at 11:07 AM EST

## 2022-01-26 ENCOUNTER — HOSPITAL ENCOUNTER (OUTPATIENT)
Dept: PHYSICAL THERAPY | Age: 62
Setting detail: THERAPIES SERIES
Discharge: HOME | End: 2022-01-26
Payer: COMMERCIAL

## 2022-01-26 PROCEDURE — G0283 ELEC STIM OTHER THAN WOUND: HCPCS

## 2022-01-26 PROCEDURE — 97110 THERAPEUTIC EXERCISES: CPT

## 2022-01-26 ASSESSMENT — PAIN DESCRIPTION - LOCATION: LOCATION: BACK;KNEE

## 2022-01-26 ASSESSMENT — PAIN SCALES - GENERAL: PAINLEVEL_OUTOF10: 5

## 2022-01-26 ASSESSMENT — PAIN DESCRIPTION - DESCRIPTORS: DESCRIPTORS: ACHING;SHARP;THROBBING

## 2022-01-26 ASSESSMENT — PAIN DESCRIPTION - ORIENTATION: ORIENTATION: LEFT

## 2022-01-26 NOTE — PROGRESS NOTES
[address] [address],[address]  [address]  WXVYO:[phone]        Date: 2022  Patient: Lalo Doctor  : 1960  ACCT #: [de-identified]  Referring Practitioner: Dr Gulshan Lee, Dr Mohamud Lr  Diagnosis: L knee OA, R knee pain and lumbar strain  Treatment Diagnosis: LBP and R>L knee pain with functional limitations, decreased ROM and strength    Visit Information:  PT Visit Information  Onset Date: 22 (surgery done in May pt not aware of exact date.)  PT Insurance Information: [employer]  Total # of Visits Approved:  (48 units till 3/4/22)  Total # of Visits to Date: 3  Plan of Care/Certification Expiration Date: 22  No Show: 0  Canceled Appointment: 0  Progress Note Counter: 48 units till 3/4/22    Subjective: Pt c/o 5/10 pain in her right knee and low back this morning. Felt good after the first therapy visit. Comments: RTD pt not sure. Follows up with Dr Mohamud Lr in 2 weeks. HEP Compliance:  [x] Good [] Fair [] Poor [] Reports not doing due to:    Vital Signs  Patient Currently in Pain: Yes   Pain Screening  Patient Currently in Pain: Yes  Pain Assessment  Pain Assessment: 0-10  Pain Level: 5  Pain Location: Back;Knee  Pain Orientation: Left  Pain Descriptors: Aching; Lattie Faden; Throbbing    OBJECTIVE:   Exercises  Exercise 2: Prone knee ext stretch R with 5# x 2' (watch timer)  Exercise 3: ham stretch 5 x 20\"  Exercise 4: hip add with ball 15 x 5\"  Exercise 5: hip abd with blue tband 15 x 5\"  Exercise 6: Bridge 10 x 5\"  Exercise 7: modified piriformis stretch 5 x 20\"  Exercise 8: SKTC 5 x 20\"  Exercise 9: LTR 10 x 5\"  Exercise 10: R gastroc stretch with foam wedge 5 x 20\" (instructed against wall and step for HEP)  Exercise 11: Resisted ham curls with BTB x 15 B  Exercise 20: HEP: LTR, SKTC, HS str, piriformis, bridge, hip add/abd, prone leg hang, hs curls, gastroc str    Strength: [x] NT  [] MMT completed:    ROM: [x] NT  [] ROM measurements:    Modalities:  Modalities  E-stim (parameters): Premod to R knee, low back in seated to decreasse pain x 10'     *Indicates exercise, modality, or manual techniques to be initiated when appropriate    Assessment: Body structures, Functions, Activity limitations: Decreased functional mobility ,Decreased ADL status,Decreased ROM,Decreased strength,Decreased posture,Increased pain  Assessment: Reviewed lumbar/knee stab therex from initial visit. Educated pt in different ways to stretch gastroc. Tolerated ex's well with no complaints. Relief reported with prone R leg ext stretch. Issued HEP with good understanding. Treatment Diagnosis: LBP and R>L knee pain with functional limitations, decreased ROM and strength  Prognosis: Good    Goals:  Short term goals  Time Frame for Short term goals: 2 weeks  Short term goal 1: The pt will demonstrate improved postural awareness requiring <25% VC's throughout treatment  Short term goal 2: Decrease R knee and Low back pain 50% to assist with improved functional gains. Long term goals  Time Frame for Long term goals : 4-6 weeks  Long term goal 1: Indep HEP for symptom management  Long term goal 2: Pt demo improved overall function by reporting greater than 50% per functional survey score  Long term goal 3: Pain-free R hip 70 deg, IR 18 deg, DF10 deg, R knee ext -8 deg  AROM to WNL allowing an increase in ADL tolerance and donning shoes. .  Long term goal 4: Improve R LE strength 4-/5 to 4/5 to allow patient to improved functional tolerance and Mod Indep with getting out of tub. Progress toward goals: ongoing, issued HEP    POST-PAIN       Pain Rating (0-10 pain scale):   0/10   Location and pain description same as pre-treatment unless indicated.    Action: [x] NA   [] Perform HEP  [] Meds as prescribed  [] Modalities as prescribed   [] Call Physician     Frequency/Duration:  Plan  Times per week: 2  Plan weeks: 4-6  Current Treatment Recommendations: Strengthening,ROM,Home Exercise Program,Manual Therapy - Soft Tissue Mobilization,Neuromuscular Re-education,Modalities,Functional Mobility Training,Gait Training,Stair training,Balance Training    Pt to continue current HEP. See objective section for any therapeutic exercise changes, additions or modifications this date.     PT Individual Minutes  Time In: 1100  Time Out: 1200  Minutes: 60  Timed Code Treatment Minutes: 45 Minutes  Procedure Minutes: E-stim x 10'      Timed Activity Minutes Units   Ther Ex 45 3       Signature:  Electronically signed by Carlee Ba PTA on 1/26/22 at 11:12 AM EST

## 2022-01-31 ENCOUNTER — HOSPITAL ENCOUNTER (OUTPATIENT)
Dept: PHYSICAL THERAPY | Age: 62
Setting detail: THERAPIES SERIES
Discharge: HOME | End: 2022-01-31
Payer: COMMERCIAL

## 2022-01-31 PROCEDURE — G0283 ELEC STIM OTHER THAN WOUND: HCPCS

## 2022-01-31 PROCEDURE — 97110 THERAPEUTIC EXERCISES: CPT

## 2022-01-31 ASSESSMENT — PAIN DESCRIPTION - ORIENTATION: ORIENTATION: RIGHT

## 2022-01-31 ASSESSMENT — PAIN DESCRIPTION - PAIN TYPE: TYPE: CHRONIC PAIN

## 2022-01-31 ASSESSMENT — PAIN DESCRIPTION - LOCATION: LOCATION: BACK;KNEE

## 2022-01-31 ASSESSMENT — PAIN SCALES - GENERAL: PAINLEVEL_OUTOF10: 7

## 2022-01-31 ASSESSMENT — PAIN DESCRIPTION - DESCRIPTORS: DESCRIPTORS: SHARP;THROBBING

## 2022-01-31 ASSESSMENT — PAIN - FUNCTIONAL ASSESSMENT: PAIN_FUNCTIONAL_ASSESSMENT: PREVENTS OR INTERFERES WITH ALL ACTIVE AND SOME PASSIVE ACTIVITIES

## 2022-01-31 ASSESSMENT — PAIN DESCRIPTION - ONSET: ONSET: AWAKENED FROM SLEEP

## 2022-01-31 ASSESSMENT — PAIN DESCRIPTION - FREQUENCY: FREQUENCY: CONTINUOUS

## 2022-01-31 NOTE — PROGRESS NOTES
[address] [address]  [address]  XBCXU:[phone]        Date: 2022  Patient: Funmilayo Hair  : 1960  ACCT #: [de-identified]  Referring Practitioner: Dr Lisa Hayward, Dr Lauren Taylor  Diagnosis: L knee OA, R knee pain and lumbar strain  Treatment Diagnosis: LBP and R>L knee pain with functional limitations, decreased ROM and strength    Visit Information:  PT Visit Information  Onset Date: 22 (surgery done in May pt not aware of exact date.)  PT Insurance Information: [employer]  Total # of Visits Approved:  (48 units till 3/4/22)  Total # of Visits to Date: 4  Plan of Care/Certification Expiration Date: 22  No Show: 0  Canceled Appointment: 0  Progress Note Counter: 48 units till 3/4/22    Subjective: Pt c/o 7/10 pain in her right knee and low back. PT stated that the pain will start in her low back on the R side and travel down the R leg. Feels like it is throbbing with sharp pains occasionally. She has an appt with Dr. Lauren Taylor on 22 for her low back symptoms. Pt not really happy with tx on the R knee and stated she may go get another opinion. Comments: RTD pt not sure. Follows up with Dr Lauren Taylor 22  HEP Compliance:  [x] Good [] [address] [] Poor [] Reports not doing due to:    Vital Signs  Patient Currently in Pain: Yes   Pain Screening  Patient Currently in Pain: Yes  Pain Assessment  Pain Assessment: 0-10  Pain Level: 7  Pain Type: Chronic pain  Pain Location: Back;Knee  Pain Orientation: Right  Pain Radiating Towards: low back on R side down the R leg. Pain Descriptors: Kailyn Bares; Throbbing  Pain Frequency: Continuous  Pain Onset: Awakened from sleep  Functional Pain Assessment: Prevents or interferes with all active and some passive activities    OBJECTIVE:   Exercises  Exercise 3: ham stretch 5 x 20\"  Exercise 4: hip add with ball 15 x 5\"  Exercise 5: hip abd with blue tband 15 x 5\"  Exercise 6: Bridge 10 x 5\"  Exercise 7: modified goals:Continues to work on Lumbar ROM and LE strength working towards LTG #4. POST-PAIN       Pain Rating (0-10 pain scale):   6/10   Location and pain description same as pre-treatment unless indicated. Action: [] NA   [x] Perform HEP  [x] Meds as prescribed  [] Modalities as prescribed   [] Call Physician     Frequency/Duration:  Plan  Times per week: 2  Plan weeks: 4-6  Current Treatment Recommendations: Strengthening,ROM,Home Exercise Program,Manual Therapy - Soft Tissue Mobilization,Neuromuscular Re-education,Modalities,Functional Mobility Training,Gait Training,Stair training,Balance Training     Pt to continue current HEP. See objective section for any therapeutic exercise changes, additions or modifications this date.      PT Individual Minutes  Time In: 0845  Time Out: 0945  Minutes: 60  Timed Code Treatment Minutes: 44 Minutes  Procedure Minutes:pre mod to R knee and R lumbar x 10'     Timed Activity Minutes Units   Ther Ex 44 3       Signature:  Electronically signed by Carmen Matta PTA on 1/31/22 at 9:12 AM EST

## 2022-02-02 ENCOUNTER — HOSPITAL ENCOUNTER (OUTPATIENT)
Dept: PHYSICAL THERAPY | Age: 62
Setting detail: THERAPIES SERIES
Discharge: HOME | End: 2022-02-02
Payer: COMMERCIAL

## 2022-02-02 NOTE — PROGRESS NOTES
Therapy                            Cancellation/No-show Note       Date:  2022  Patient Name:  Benja Oliver  :  1960   MRN:  [id number]  Referring Practitioner: Dr Fern Contreras, Dr Darci Mahoney  Diagnosis: L knee OA, R knee pain and lumbar strain    Visit Information:   PT Visit Information  Onset Date: 22 (surgery done in May pt not aware of exact date.)  PT Insurance Information: [employer]  Total # of Visits Approved:  (48 units till 3/4/22)  Total # of Visits to Date: 4  Plan of Care/Certification Expiration Date: 22  No Show: 0  Canceled Appointment: 1  Progress Note Counter:  units till 3/4/22 (cx'd on 22 - no reason given)    For today's appointment patient:  [x]  Cancelled  []  Rescheduled appointment  []  No-show   []  Called pt to remind of next appointment     Reason given by patient:  []  Patient ill  []  Conflicting appointment  []  No transportation    []  Conflict with work  [x]  No reason given  []  Other:      [x] Pt has future appointments scheduled, no follow up needed  [] Pt requests to be on hold.     Reason:   If > 2 weeks please discuss with therapist.  [] Therapist to call pt for follow up     Comments:       Signature: Electronically signed by Jolie Gaytan PTA on 22 at 8:40 AM EST

## 2022-02-16 ENCOUNTER — HOSPITAL ENCOUNTER (OUTPATIENT)
Dept: PHYSICAL THERAPY | Age: 62
Setting detail: THERAPIES SERIES
Discharge: HOME | End: 2022-02-16
Payer: COMMERCIAL

## 2022-02-16 PROCEDURE — 97110 THERAPEUTIC EXERCISES: CPT

## 2022-02-16 ASSESSMENT — PAIN DESCRIPTION - LOCATION: LOCATION: BACK;KNEE

## 2022-02-16 ASSESSMENT — PAIN DESCRIPTION - PAIN TYPE: TYPE: CHRONIC PAIN

## 2022-02-16 ASSESSMENT — PAIN DESCRIPTION - DESCRIPTORS: DESCRIPTORS: SHARP;SHOOTING

## 2022-02-16 ASSESSMENT — PAIN DESCRIPTION - FREQUENCY: FREQUENCY: CONTINUOUS

## 2022-02-16 ASSESSMENT — PAIN SCALES - GENERAL: PAINLEVEL_OUTOF10: 5

## 2022-02-16 ASSESSMENT — PAIN DESCRIPTION - ONSET: ONSET: AWAKENED FROM SLEEP

## 2022-02-16 ASSESSMENT — PAIN DESCRIPTION - ORIENTATION: ORIENTATION: RIGHT

## 2022-02-16 NOTE — PROGRESS NOTES
[address] [address]  [address]:[phone]        Date: 2022  Patient: Alexis Perea  : 1960  ACCT #: [de-identified]  Referring Practitioner: Dr Poornima Frazier, Dr Kendrick Henderson  Diagnosis: L knee OA, R knee pain and lumbar strain  Treatment Diagnosis: LBP and R>L knee pain with functional limitations, decreased ROM and strength    Visit Information:  PT Visit Information  Onset Date: 22 (surgery done in May pt not aware of exact date.)  PT Insurance Information: [employer]  Total # of Visits Approved:  (48 units till 3/4/22)  Total # of Visits to Date: 5  Plan of Care/Certification Expiration Date: 22  No Show: 0  Canceled Appointment: 1  Progress Note Counter: 14/48 units till 3/4/22    Subjective: Pt reports a 4-5 pain in her low back and right knee today. She just got back from a trip to [place]. She experienced a lot of knee pain walking in the airport. Has a consult with Dr. Tal Lai today in regard to her knee. Comments: Dr. Tal Lai consult   HEP Compliance:  [x] Good [] Fair [] Poor [] Reports not doing due to:    Vital Signs  Patient Currently in Pain: Yes   Pain Screening  Patient Currently in Pain: Yes  Pain Assessment  Pain Assessment: 0-10  Pain Level: 5  Pain Type: Chronic pain  Pain Location: Back;Knee  Pain Orientation: Right  Pain Descriptors: [place]; Shooting  Pain Frequency: Continuous  Pain Onset: Awakened from sleep    OBJECTIVE:   Exercises  Exercise 2: Prone knee ext stretch R with 5# x 3' (watch timer)  Exercise 3: ham stretch 5 x 20\"  Exercise 4: hip add with ball 15 x 5\"  Exercise 5: hip abd with black tband 15 x 5\"  Exercise 6: Bridge 10 x 5\"  Exercise 7: modified piriformis stretch 5 x 20\" seated - held (can resume next visit)  Exercise 8: SKTC 5 x 20\"  Exercise 9: LTR 10 x 5\"  Exercise 10: R gastroc stretch with foam wedge 5 x 20\"  Exercise 11: ham curls with BTB x 15 B  Exercise 20: HEP: 22 LTR, SKTC, HS str, piriformis, bridge, hip add/abd, prone leg hang, hs curls, gastroc str    Strength: [x] NT  [] MMT completed:    ROM: [] NT  [x] ROM measurements:  PROM RLE (degrees)  RLE General PROM: 18 IR Hip  AROM RLE (degrees)  RLE General AROM: Hip flex 77, Knee-15-95, ankle DF 10 deg (Knee ext -11 in prone with 5# wt)    Modalities:  Modalities  E-stim (parameters): Declined due to time constraint     *Indicates exercise, modality, or manual techniques to be initiated when appropriate    Assessment: Body structures, Functions, Activity limitations: Decreased functional mobility ,Decreased ADL status,Decreased ROM,Decreased strength,Decreased posture,Increased pain  Assessment: Pt tolerated increased tband resistance with hip abd. Resumed prone leg hang with 5# which provided a 4° improvement in R knee ext. Also resumed gastroc stretch with wedge to further improve R knee ext and dorsiflexion. Pt responded well to therex. Declined E-stim d/t time constraint. Treatment Diagnosis: LBP and R>L knee pain with functional limitations, decreased ROM and strength  Prognosis: Good    Goals:  Short term goals  Time Frame for Short term goals: 2 weeks  Short term goal 1: The pt will demonstrate improved postural awareness requiring <25% VC's throughout treatment  Short term goal 2: Decrease R knee and Low back pain 50% to assist with improved functional gains. Long term goals  Time Frame for Long term goals : 4-6 weeks  Long term goal 1: Indep HEP for symptom management  Long term goal 2: Pt demo improved overall function by reporting greater than 50% per functional survey score  Long term goal 3: Pain-free R hip 70 deg, IR 18 deg, DF10 deg, R knee ext -8 deg  AROM to WNL allowing an increase in ADL tolerance and donning shoes. .  Long term goal 4: Improve R LE strength 4-/5 to 4/5 to allow patient to improved functional tolerance and Mod Indep with getting out of tub.   Progress toward goals: ongoing, partially met LTG 3 (see ROM)    POST-PAIN       Pain Rating (0-10 pain scale):   6/10   Location and pain description same as pre-treatment unless indicated. Action: [] NA   [x] Perform HEP  [x] Meds as prescribed  [] Modalities as prescribed   [] Call Physician     Frequency/Duration:  Plan  Times per week: 2  Plan weeks: 4-6  Current Treatment Recommendations: Strengthening,ROM,Home Exercise Program,Manual Therapy - Soft Tissue Mobilization,Neuromuscular Re-education,Modalities,Functional Mobility Training,Gait Training,Stair training,Balance Training    Pt to continue current HEP. See objective section for any therapeutic exercise changes, additions or modifications this date.     PT Individual Minutes  Time In: 5279  Time Out: 9205  Minutes: 43  Timed Code Treatment Minutes: 43 Minutes     Timed Activity Minutes Units   Ther Ex 43 3     Signature:  Electronically signed by Zafar Stewart PTA on 2/16/22 at 8:43 AM EST

## 2022-02-23 ENCOUNTER — OFFICE VISIT (OUTPATIENT)
Dept: PAIN MANAGEMENT | Age: 62
End: 2022-02-23
Payer: COMMERCIAL

## 2022-02-23 VITALS
SYSTOLIC BLOOD PRESSURE: 138 MMHG | WEIGHT: 167 LBS | BODY MASS INDEX: 35.05 KG/M2 | HEIGHT: 58 IN | TEMPERATURE: 98.3 F | DIASTOLIC BLOOD PRESSURE: 70 MMHG

## 2022-02-23 DIAGNOSIS — Z98.1 HISTORY OF LUMBAR SPINAL FUSION: ICD-10-CM

## 2022-02-23 DIAGNOSIS — G89.4 CHRONIC PAIN SYNDROME: ICD-10-CM

## 2022-02-23 DIAGNOSIS — M47.817 LUMBOSACRAL SPONDYLOSIS WITHOUT MYELOPATHY: Primary | ICD-10-CM

## 2022-02-23 DIAGNOSIS — Z79.891 ENCOUNTER FOR LONG-TERM OPIATE ANALGESIC USE: ICD-10-CM

## 2022-02-23 DIAGNOSIS — M17.0 PRIMARY OSTEOARTHRITIS OF BOTH KNEES: ICD-10-CM

## 2022-02-23 LAB
6-ACETYLMORPHINE, UR: NORMAL
AMPHETAMINE SCREEN, URINE: NORMAL
BARBITURATE SCREEN, URINE: NORMAL
BENZODIAZEPINE SCREEN, URINE: NORMAL
CANNABINOID SCREEN URINE: NORMAL
COCAINE METABOLITE, URINE: NORMAL
CREATININE URINE: NORMAL
EDDP, URINE: NORMAL
ETHANOL URINE: NORMAL
MDMA URINE: NORMAL
METHADONE SCREEN, URINE: NORMAL
METHAMPHETAMINE, URINE: NORMAL
OPIATES, URINE: NORMAL
OXYCODONE: NORMAL
PCP: NORMAL
PH, URINE: NORMAL
PHENCYCLIDINE, URINE: NORMAL
PROPOXYPHENE, URINE: NORMAL
TRICYCLIC ANTIDEPRESSANTS, UR: NORMAL

## 2022-02-23 PROCEDURE — 3017F COLORECTAL CA SCREEN DOC REV: CPT | Performed by: NURSE PRACTITIONER

## 2022-02-23 PROCEDURE — G8484 FLU IMMUNIZE NO ADMIN: HCPCS | Performed by: NURSE PRACTITIONER

## 2022-02-23 PROCEDURE — 99214 OFFICE O/P EST MOD 30 MIN: CPT | Performed by: NURSE PRACTITIONER

## 2022-02-23 PROCEDURE — G8417 CALC BMI ABV UP PARAM F/U: HCPCS | Performed by: NURSE PRACTITIONER

## 2022-02-23 PROCEDURE — 1036F TOBACCO NON-USER: CPT | Performed by: NURSE PRACTITIONER

## 2022-02-23 PROCEDURE — G8427 DOCREV CUR MEDS BY ELIG CLIN: HCPCS | Performed by: NURSE PRACTITIONER

## 2022-02-23 RX ORDER — GABAPENTIN 300 MG/1
300 CAPSULE ORAL NIGHTLY
Qty: 30 CAPSULE | Refills: 0 | Status: SHIPPED | OUTPATIENT
Start: 2022-02-23 | End: 2022-03-23 | Stop reason: DRUGHIGH

## 2022-02-23 RX ORDER — HYDROCODONE BITARTRATE AND ACETAMINOPHEN 5; 325 MG/1; MG/1
1 TABLET ORAL DAILY PRN
Qty: 30 TABLET | Refills: 0 | Status: SHIPPED | OUTPATIENT
Start: 2022-02-23 | End: 2022-03-25

## 2022-02-23 ASSESSMENT — ENCOUNTER SYMPTOMS
BACK PAIN: 1
CONSTIPATION: 0
RESPIRATORY NEGATIVE: 1
DIARRHEA: 0

## 2022-02-23 NOTE — PROGRESS NOTES
Patient: Funmilayo Hair  YOB: 1960  Date: 22        Subjective:     Funmilayo Hair is a 60 y.o. female who complains today of:    Chief Complaint   Patient presents with    Back Pain         Allergies:  Patient has no known allergies. Past Medical History:   Diagnosis Date    Hypertension      Past Surgical History:   Procedure Laterality Date    KNEE ARTHROSCOPY Left 2018    TOTAL KNEE ARTHROPLASTY Left 2021     Family History   Problem Relation Age of Onset    Diabetes Mother     Breast Cancer Neg Hx     Cancer Neg Hx     Colon Cancer Neg Hx     Eclampsia Neg Hx     Hypertension Neg Hx     Ovarian Cancer Neg Hx      Labor Neg Hx     Spont Abortions Neg Hx     Stroke Neg Hx      Social History     Socioeconomic History    Marital status:      Spouse name: Not on file    Number of children: Not on file    Years of education: Not on file    Highest education level: Not on file   Occupational History    Not on file   Tobacco Use    Smoking status: Never Smoker    Smokeless tobacco: Never Used   Substance and Sexual Activity    Alcohol use: No    Drug use: No    Sexual activity: Yes   Other Topics Concern    Not on file   Social History Narrative    Not on file     Social Determinants of Health     Financial Resource Strain:     Difficulty of Paying Living Expenses: Not on file   Food Insecurity:     Worried About Running Out of Food in the Last Year: Not on file    Jacob of Food in the Last Year: Not on file   Transportation Needs:     Lack of Transportation (Medical): Not on file    Lack of Transportation (Non-Medical):  Not on file   Physical Activity:     Days of Exercise per Week: Not on file    Minutes of Exercise per Session: Not on file   Stress:     Feeling of Stress : Not on file   Social Connections:     Frequency of Communication with Friends and Family: Not on file    Frequency of Social Gatherings with Friends and Family: Not on file    Attends [employer] Services: Not on file    Active Member of Clubs or Organizations: Not on file    Attends Club or Organization Meetings: Not on file    Marital Status: Not on file   Intimate Partner Violence:     Fear of Current or Ex-Partner: Not on file    Emotionally Abused: Not on file    Physically Abused: Not on file    Sexually Abused: Not on file   Housing Stability:     Unable to Pay for Housing in the Last Year: Not on file    Number of [place] in the Last Year: Not on file    Unstable Housing in the Last Year: Not on file       Current Outpatient Medications on File Prior to Visit   Medication Sig Dispense Refill    diclofenac sodium (VOLTAREN) 1 % GEL Apply 4 g topically 2 times daily 100 g 0    atorvastatin (LIPITOR) 20 MG tablet TAKE ONE TABLET BY MOUTH EVERY DAY  5    lisinopril-hydrochlorothiazide (PRINZIDE;ZESTORETIC) 20-12.5 MG per tablet Take 1 tablet by mouth      gabapentin (NEURONTIN) 600 MG tablet Take 1 tablet by mouth 2 times daily for 30 days. 60 tablet 0     No current facility-administered medications on file prior to visit. Patient follows for chronic knee and low back pain. Back pain 6/10, knees not too bad. She wants to restart opioids because the medical marijuana is too expensive and not very effective. Last marijuana fill 12/2021, and last use December, using CBD oil. Apparently had VALERIA two months ago at [facility] with 2 days relief. Had right knee injection with Dr. Lia Gallardo last month. Getting lumbar MRI today per Dr Lia Gallardo. She says he wanted to get it because of her leg pain and possibly will need surgery again. She is in PT at [facility] for [facility]. She does not want any knee surgery at this point. Random urine drug screen done 8/2021 positive for THC. Discussed with patient for past few months that she needs to choose between medical marijuana and narcotics. She has her medical marijuana card and uses Gummies.   She also uses CBD with THC cream for her left knee. She said she was taking  the Percocet in the morning and then the marijuana the rest of the day. Had left over gabapentin and has been taking sparingly.       Doing well after a left total knee arthroplasty done by Dr. Alexia Koyanagi for Orthopedics, on 03/08/2021. Had follow-up appointment with Dr. Karin Espinoza on May 12, 2021 and he said her left knee replacement is doing well, the right knee was injected with steroid but it does not need any surgery anytime soon. She also gets Ambien from Dr. Zully Wilson and will take a half for sleep as needed. Uses phentermine. Medications help with remaining functional with chores, personal hygiene, remaining compliant with home exercise program, maintaining her quality of life, and performing activities of daily living. She obtains greater than 50% pain relief without any significant side effects. She is clear to avoid driving or operating heavy machinery or to perform any activities where she may harm herself or others while on pain medications.     Low back pain is currently a 6/10. It gets up to an 8/10. The pain is located in both sides of her low back and into her low back. The pain is worse with bending and activity. The pain is better with rest and pain medicine. She has a history of lumbar spine fusion with Dr. Karin Espinoza in May of 2020. It has been a constant ache for over two years. There are no other associated symptoms or contextual factors. She denies any classic radicular symptoms, new weakness, saddle anesthesia, bowel or bladder dysfunction, or falls. Left knee pain 4/10. It gets up to a 6/10. Healing after a left total knee arthroplasty with Dr. Karin Espinoza. It has been a constant ache for years, slowly improving after surgery. Better with pain medicine. There are no other associated symptoms or contextual factors.  She denies any classic radicular symptoms, new weakness, saddle anesthesia, bowel or bladder dysfunction, or falls.          Back Pain  Pertinent negatives include no headaches, numbness or weakness. Review of Systems   Constitutional: Negative. Negative for activity change and unexpected weight change. HENT: Negative. Negative for hearing loss. Respiratory: Negative. Cardiovascular: Negative for leg swelling. Gastrointestinal: Negative for constipation and diarrhea. Genitourinary: Negative. Musculoskeletal: Positive for arthralgias and back pain. Negative for gait problem, joint swelling and neck pain. Skin: Negative for rash. Neurological: Negative for dizziness, weakness, numbness and headaches. Psychiatric/Behavioral: Negative. Negative for sleep disturbance. Objective:     Vitals:  /70   Temp 98.3 °F (36.8 °C)   Ht 4' 10\" (1.473 m)   Wt 167 lb (75.8 kg)   BMI 34.90 kg/m² Pain Score:   6    Physical Exam  Vitals reviewed. Constitutional:       Appearance: She is well-developed. HENT:      Head: Normocephalic. Nose: Nose normal.   Pulmonary:      Effort: Pulmonary effort is normal.   Musculoskeletal:      Cervical back: Normal range of motion and neck supple. Lumbar back: Tenderness present. Decreased range of motion. Positive right straight leg raise test. Negative left straight leg raise test.      Right knee: Swelling present. Decreased range of motion. Tenderness present. Lymphadenopathy:      Cervical: No cervical adenopathy. Skin:     General: Skin is warm and dry. Findings: No erythema or rash. Neurological:      Mental Status: She is alert and oriented to person, place, and time. Cranial Nerves: No cranial nerve deficit. Deep Tendon Reflexes: Reflexes are normal and symmetric. Reflexes normal.   Psychiatric:         Mood and Affect: Mood normal.         Behavior: Behavior normal.         Thought Content: Thought content normal.         Judgment: Judgment normal.            Assessment:        Diagnosis Orders   1. Lumbosacral spondylosis without myelopathy  Urine Drug Screen    HYDROcodone-acetaminophen (NORCO) 5-325 MG per tablet   2. History of lumbar spinal fusion     3. Chronic pain syndrome     4. Encounter for long-term opiate analgesic use     5. Primary osteoarthritis of both knees         Plan:          Orders Placed This Encounter   Medications    HYDROcodone-acetaminophen (NORCO) 5-325 MG per tablet     Sig: Take 1 tablet by mouth daily as needed for Pain for up to 30 days. Dispense:  30 tablet     Refill:  0     Reduce doses taken as pain becomes manageable    gabapentin (NEURONTIN) 300 MG capsule     Sig: Take 1 capsule by mouth nightly for 30 days. Intended supply: 30 days     Dispense:  30 capsule     Refill:  0       Orders Placed This Encounter   Procedures    Urine Drug Screen     Chronic pain/illicits     -I will restart opioids with Norco 5 mg daily as needed pain, #30  -Restart gabapentin 300 mg nightly, #30  -Urine drug screen, no marijuana since December, uses CBD cream for her knee, may have used gabapentin recently  -Scheduling a lumbar MRI tonight per Dr. Haroon Dhaliwal for possible surgery  -Following with Ortho for her knees    Discussed options with the patient today. Anatomic model pathology was shown and reviewed with pt. All questions were answered. Relevant imaging reviewed, NDP reviewed and pain generators reviewed. Pt verbalized understanding and agrees with above plan. Will continue medications as they do help pt function with ADL and improve quality of life. Pt understands potential side effects from opioids such as constipation, dry mouth, dizziness, opioid use disorder, and overdose. Pt has failed non opioid therapy and decision was made to prescribe opioids to help with daily function and improve quality of life. Discussed the principles of opioid tolerance as well as the concerns regarding opioid diversion, misuse, and abuse.       Discussed the risks of respiratory depression and death while on pain medications, especially when combined with other sedative substances. Discussed the elevated risks of excessive sedation while on pain medications. Advised him against driving or operating heavy machinery or performing any activities where he may harm himself or others while on pain medications. Particular caution was emphasized especially during dose adjustments and medication changes. [place]ARRS was reviewed. UTOX from 8/21/21Utox appropriate for oxycodone, negative for gabapentin.  Low levels of THC and alcohol noted.   appropriate; ORT score = 1  Narcan prescribed 4/2021 and understands the proper use in the event of an overdose. This NP saw pt under direct supervision of Dr Samantha Moreno. Controlled Substances Monitoring: Follow up:  Return in about 4 weeks (around 3/23/2022) for F/U Dr Samantha Moreno.     Myrna Ray, KASANDRA - CNP

## 2022-03-01 DIAGNOSIS — F51.01 PRIMARY INSOMNIA: ICD-10-CM

## 2022-03-01 RX ORDER — ZOLPIDEM TARTRATE 5 MG/1
5 TABLET ORAL NIGHTLY PRN
Qty: 30 TABLET | Refills: 0 | Status: SHIPPED | OUTPATIENT
Start: 2022-03-01 | End: 2022-03-15

## 2022-03-01 NOTE — TELEPHONE ENCOUNTER
Received call from patient  requesting refill for Rx Ambien, patient pharmacy confirmed,patient last visit 8-24-21.

## 2022-03-15 NOTE — PROGRESS NOTES
[address]  [facility] [address] [place] [address]  GUNIQ:[phone]     []? Certification  []? Recertification     []? Plan of Care  []? Progress Note [x]? Discharge                            To:  Dr Milvia Stock, Dr Malachi Daniels      From:  Raine Kenny, PT  Patient: Cezar Irizarry     : 1960  Diagnosis: L knee OA, R knee pain and lumbar strain     Date: 3/15/2022  Treatment Diagnosis: LBP and R>L knee pain with functional limitations, decreased ROM and strength  Progress Report Period from:  2022  to 2022     Total # of Visits to Date: 5   No Show: 1    Canceled Appointment: 1     OBJECTIVE:   Short Term Goals - Time Frame for Short term goals: 2 weeks    Goals Current/Discharge status  Met   Short term goal 1: The pt will demonstrate improved postural awareness requiring <25% VC's throughout treatment  Not tested due to unexpected discharge. []? yes  [x]? no   Short term goal 2: Decrease R knee and Low back pain 50% to assist with improved functional gains. Pain Location: Back,Knee    Pain Level: 5 (R knee and back)    Pain Descriptors: Edman Jasmyne, Shooting  []? yes  [x]? no      Long Term Goals - Time Frame for Long term goals : 4-6 weeks  Goals Current/ Discharge status Met   Long term goal 1: Indep HEP for symptom management Written HEP initiated  for symptom management and was compliant  [x]? yes  []? no   Long term goal 2: Pt demo improved overall function by reporting greater than 50% per functional survey score Not tested due to unexpected discharge.     []? yes  [x]? no   Long term goal 3: Pain-free R hip 70 deg, IR 18 deg, DF10 deg, R knee ext -8 deg  AROM to WNL allowing an increase in ADL tolerance and donning shoes.  . Taken 22:  PROM RLE (degrees)  RLE General PROM: 18 IR Hip  AROM RLE (degrees)  RLE General AROM: Hip flex 77, Knee-15-95, ankle DF 10 deg (Knee ext -11 in prone with 5# wt) [x]? partial  []? no   Long term goal 4: Improve R LE strength 4-/5 to 4/5 to allow patient to improved functional tolerance and Mod Indep with getting out of tub. Not tested due to unexpected discharge. []? yes  [x]? no      Body structures, Functions, Activity limitations: Decreased functional mobility ,Decreased ADL status,Decreased ROM,Decreased strength,Decreased posture,Increased pain     Assessment: Functional outcomes limited: Pt did not return to PT after 2/16/22 unable to determine functional outcomes d/t unexpected D/C. Pt transferring to another therapy clinic. Prognosis: Good    PT Education: Goals;PT Role;Plan of Care     PLAN: [x]? Discharge                                                 Patient Status:[]? Continue/ Initiate plan of Care                          [x]? Discharge PT. Recommend pt continue with HEP.                            []? Additional visits requested, Please re-certify for additional visits:                                                    Objective info by: Electronically signed by Ilan Signer PTA on 3/15/22 at 11:22 AM EDT   Signature: Electronically signed by Sharifa Arellano PT on 3/15/2022 at 12:15 PM

## 2022-03-23 ENCOUNTER — OFFICE VISIT (OUTPATIENT)
Dept: PAIN MANAGEMENT | Age: 62
End: 2022-03-23
Payer: COMMERCIAL

## 2022-03-23 VITALS
BODY MASS INDEX: 34 KG/M2 | TEMPERATURE: 97.6 F | WEIGHT: 162 LBS | HEIGHT: 58 IN | DIASTOLIC BLOOD PRESSURE: 78 MMHG | SYSTOLIC BLOOD PRESSURE: 130 MMHG

## 2022-03-23 DIAGNOSIS — M25.562 CHRONIC PAIN OF LEFT KNEE: ICD-10-CM

## 2022-03-23 DIAGNOSIS — Z98.1 HISTORY OF LUMBAR SPINAL FUSION: ICD-10-CM

## 2022-03-23 DIAGNOSIS — M17.11 PRIMARY OSTEOARTHRITIS OF RIGHT KNEE: ICD-10-CM

## 2022-03-23 DIAGNOSIS — M16.11 PRIMARY OSTEOARTHRITIS OF RIGHT HIP: ICD-10-CM

## 2022-03-23 DIAGNOSIS — G89.29 CHRONIC PAIN OF LEFT KNEE: ICD-10-CM

## 2022-03-23 DIAGNOSIS — M47.817 LUMBOSACRAL SPONDYLOSIS WITHOUT MYELOPATHY: Primary | ICD-10-CM

## 2022-03-23 PROCEDURE — 99213 OFFICE O/P EST LOW 20 MIN: CPT | Performed by: PHYSICAL MEDICINE & REHABILITATION

## 2022-03-23 PROCEDURE — 3017F COLORECTAL CA SCREEN DOC REV: CPT | Performed by: PHYSICAL MEDICINE & REHABILITATION

## 2022-03-23 PROCEDURE — G8484 FLU IMMUNIZE NO ADMIN: HCPCS | Performed by: PHYSICAL MEDICINE & REHABILITATION

## 2022-03-23 PROCEDURE — G8417 CALC BMI ABV UP PARAM F/U: HCPCS | Performed by: PHYSICAL MEDICINE & REHABILITATION

## 2022-03-23 PROCEDURE — 1036F TOBACCO NON-USER: CPT | Performed by: PHYSICAL MEDICINE & REHABILITATION

## 2022-03-23 PROCEDURE — G8427 DOCREV CUR MEDS BY ELIG CLIN: HCPCS | Performed by: PHYSICAL MEDICINE & REHABILITATION

## 2022-03-23 RX ORDER — HYDROCODONE BITARTRATE AND ACETAMINOPHEN 5; 325 MG/1; MG/1
1 TABLET ORAL DAILY PRN
Qty: 30 TABLET | Refills: 0 | Status: CANCELLED | OUTPATIENT
Start: 2022-03-23 | End: 2022-04-22

## 2022-03-23 RX ORDER — GABAPENTIN 300 MG/1
300 CAPSULE ORAL NIGHTLY
Qty: 30 CAPSULE | Refills: 0 | Status: CANCELLED | OUTPATIENT
Start: 2022-03-23 | End: 2022-04-22

## 2022-03-23 RX ORDER — TIZANIDINE 4 MG/1
4 TABLET ORAL NIGHTLY PRN
Qty: 30 TABLET | Refills: 0 | Status: SHIPPED | OUTPATIENT
Start: 2022-03-23 | End: 2022-04-22

## 2022-03-23 RX ORDER — GABAPENTIN 600 MG/1
600 TABLET ORAL 3 TIMES DAILY
Qty: 90 TABLET | Refills: 0 | Status: SHIPPED | OUTPATIENT
Start: 2022-03-23 | End: 2022-04-27 | Stop reason: SDUPTHER

## 2022-03-23 ASSESSMENT — ENCOUNTER SYMPTOMS
NAUSEA: 0
DIARRHEA: 0
CONSTIPATION: 0
SHORTNESS OF BREATH: 0
BACK PAIN: 1

## 2022-03-23 NOTE — PROGRESS NOTES
Hortensia Ivy  (1960)    3/23/2022    Subjective:     Hortensia Ivy is 60 y.o. female who complains today of:    Chief Complaint   Patient presents with    Back Pain     Seen by me 7/26/21, last seen 2/23/22: [place], shows medical marijuana. Norco and gabapentin restarted 2/23/2022. We had stopped opioids 11/3/2021. She was previously on Percocet back in August 2021. Gabapentin was prescribed at one time at 600 mg twice a day. Patient was not interested in any further injections or surgeries. Discussed her use of marijuana. Initially she stated that she has not tried any marijuana since December. She states that she wasn't using any marijuana. When confronted about her OARRS report with marijuana filled 3/12/22 with Dr Monia Claude, the patient then reluctantly admits that she tried a new product, she states that it didn't help her and \"all it did was make me gain weight. \" She has had multiple rounds of marijuana use while under pain management, multiple urine drug screens with marijuana present. She requests to be put back to her Gabapentin 600 mg BID dose, had 300 mg last month, also requests muscle relaxer refill. She would like to hold on epidural injections until after her MRI LS Spine is completed and reviewed through orthopedics. She notes she is going for ?fluoroscopically guided hip injection under Dr Rashad Allen request later this week. No other tests, therapy or updates from other physicians, no emergency room visits. Gabapentin 600 mg twice daily and tizanidine 4 mg once daily at bedtime help with remaining functional with chores, personal hygiene, remaining compliant with home exercise program, maintaining her quality of life, and performing activities of daily living. She obtains greater than 50% pain relief without any significant side effects.  She is clear to avoid driving or operating heavy machinery or to perform any activities where she may harm herself or others while on pain medications. Low back pain is currently a 6/10. It gets up to an 8/10. The pain is located in both sides of her low back and into her low back. The pain is worse with bending and activity. The pain is better with rest and pain medicine. She has a history of lumbar spine fusion L5/S1 with Dr. Millicent Montes De Oca in May of 2020. It has been a constant ache for over two years. There are no other associated symptoms or contextual factors. She denies any classic radicular symptoms, new weakness, saddle anesthesia, bowel or bladder dysfunction, or falls. Right knee is a 7/10. Left knee pain 4/10. It gets up to a 8/10. Also with some pain in the right knee doing slightly better with right knee viscosupplementation. She is healing after a left total knee arthroplasty with Dr. Millicent Montes De Oca. It has been a constant ache for years, slowly improving after surgery. Better with opioid pain medicine. There are no other associated symptoms or contextual factors. She denies any classic radicular symptoms, new weakness, saddle anesthesia, bowel or bladder dysfunction, or falls. Right hip pain is a 6/10. Gets to a 9/10. Worse with walking. Better with rest. Constant ache for over 1 year. There are no other associated symptoms or contextual factors. She denies any classic radicular symptoms, new weakness, saddle anesthesia, bowel or bladder dysfunction, or falls. Allergies:  Patient has no known allergies.     Past Medical History:   Diagnosis Date    Hypertension      Past Surgical History:   Procedure Laterality Date    KNEE ARTHROSCOPY Left 2018    TOTAL KNEE ARTHROPLASTY Left 2021     Family History   Problem Relation Age of Onset    Diabetes Mother     Breast Cancer Neg Hx     Cancer Neg Hx     Colon Cancer Neg Hx     Eclampsia Neg Hx     Hypertension Neg Hx     Ovarian Cancer Neg Hx      Labor Neg Hx     Spont Abortions Neg Hx     Stroke Neg Hx      Social History     Socioeconomic History    Marital status:      Spouse name: Not on file    Number of children: Not on file    Years of education: Not on file    Highest education level: Not on file   Occupational History    Not on file   Tobacco Use    Smoking status: Never Smoker    Smokeless tobacco: Never Used   Substance and Sexual Activity    Alcohol use: No    Drug use: No    Sexual activity: Yes   Other Topics Concern    Not on file   Social History Narrative    Not on file     Social Determinants of Health     Financial Resource Strain:     Difficulty of Paying Living Expenses: Not on file   Food Insecurity:     Worried About Running Out of Food in the Last Year: Not on file    Jacob of Food in the Last Year: Not on file   Transportation Needs:     Lack of Transportation (Medical): Not on file    Lack of Transportation (Non-Medical):  Not on file   Physical Activity:     Days of Exercise per Week: Not on file    Minutes of Exercise per Session: Not on file   Stress:     Feeling of Stress : Not on file   Social Connections:     Frequency of Communication with Friends and Family: Not on file    Frequency of Social Gatherings with Friends and Family: Not on file    Attends Latter day Services: Not on file    Active Member of [address] [place] [address] [employer] or Organizations: Not on file    Attends Club or Organization Meetings: Not on file    Marital Status: Not on file   Intimate Partner Violence:     Fear of Current or Ex-Partner: Not on file    Emotionally Abused: Not on file    Physically Abused: Not on file    Sexually Abused: Not on file   Housing Stability:     Unable to Pay for Housing in the Last Year: Not on file    Number of [place] in the Last Year: Not on file    Unstable Housing in the Last Year: Not on file       Current Outpatient Medications on File Prior to Visit   Medication Sig Dispense Refill    HYDROcodone-acetaminophen (NORCO) 5-325 MG per tablet Take 1 tablet by mouth daily as needed for Pain for up to 30 days. 30 tablet 0    diclofenac sodium (VOLTAREN) 1 % GEL Apply 4 g topically 2 times daily 100 g 0    atorvastatin (LIPITOR) 20 MG tablet TAKE ONE TABLET BY MOUTH EVERY DAY  5    lisinopril-hydrochlorothiazide (PRINZIDE;ZESTORETIC) 20-12.5 MG per tablet Take 1 tablet by mouth       No current facility-administered medications on file prior to visit. Back Pain  Pertinent negatives include no fever or headaches. Review of Systems   Constitutional: Negative for fever. HENT: Negative for hearing loss. Respiratory: Negative for shortness of breath. Gastrointestinal: Negative for constipation, diarrhea and nausea. Genitourinary: Negative for difficulty urinating. Musculoskeletal: Positive for back pain. Negative for neck pain. Skin: Negative for rash. Neurological: Negative for headaches. Hematological: Does not bruise/bleed easily. Psychiatric/Behavioral: Negative for sleep disturbance. Objective:     Vitals:  /78   Temp 97.6 °F (36.4 °C)   Ht 4' 10\" (1.473 m)   Wt 162 lb (73.5 kg)   BMI 33.86 kg/m² Pain Score:   6      Exam performed under coronavirus precautions  General: No acute distress  Eyes: No scleral icterus or lid lag appreciated bilaterally  ENMT: Moist mucous membranes. Hearing grossly intact bilaterally. No masses or lesions on ears or nose  Neck: Symmetric without any overt masses or lesions, trachea midline  Respiratory: Respirations are non-labored  Skin: Visualized skin is intact  Psych: Mood normal. Affect normal. Recent and remote memory intact. Judgment and insight normal.  Neurologic: Gait antalgic, no assistive devices for ambulation. Pt is alert and appropriately interactive. Pleasant and cooperative with history and exam. No signs of excessive sedation. Responds promptly and appropriately to questions asked. No aberrant behaviors appreciated.       Sore left knee anterior from total knee arthroplasty   Limited range of motion right hip  Bilateral L5 paresthesias  Tender lumbar paraspinals  Tender bilateral knee joint lines.     Otherwise sensation grossly intact in both legs. Reflexes and strength functional for ambulation, no abnormal reflexes appreciated on exam today  Strength greater than 3/5 bilateral legs  Straight leg raise negative bilaterally.           XR right hip 3/22/2022 Dr. Josefa Romero: Mild joint space narrowing, no fracture. XR R knee 2/16/2022: Up to severe medial joint space reduction, no fracture  Orthopedic surgery 2/1/2022: L5-S1 globus TLIF May 2020, L4-S1 laminectomy. New tests and refer to pain management for potential epidural injections. XR L-spine 2/1/2022 Dr. Josefa Romero: MIS TLIF L5-S1, cage screws and rods are in good position. Mild spondylolisthesis L4 and L5. Diffuse degenerative changes above the level of fusion. Scoliosis 30 to 35 degrees unchanged. No fractures. Orthopedic surgery follow-up 7/21/2021: Ransom Boeck right knee. Orthopedic surgery follow-up 5/14/2021: 1 year follow-up L5-S1 TLIF and laminectomy. \"She says she is only 5% better\". X-rays show 35 degree scoliosis and if scoliosis worsen of a fusion might be optimal option. The patient said absolutely not, Elvin Galindo is never going to\"  XR LS spine 5/14/2021: [facility] for orthopedics read. Diffuse degenerative changes, MIS T LIF L5-S1 with good positioning of cage around screws and rods, no hardware failure, mild spondylolisthesis L4 and L5, 35 degree degenerative scoliosis thoracolumbar spine, no fractures, hips partially visualized some moderate bilateral hip arthritis. MRI LS-spine 02/12/2020:  Degenerative disc disease and facet arthropathy. T12 to L4 normal canal and foramen. L4-5 mild canal stenosis, mild bilateral foraminal narrowing. L5-S1 moderate left foraminal narrowing, mild canal.    XR LS-spine 06/02/2020:  MSI TLIF L5-S1. Screws and cage in good position. Scoliosis above the level of fusion. No new fractures. No instability.  read Dr. Kelsie Jacobson. XR bilateral knees 10/01/2020:  Mild right moderate left medial joint space reduction. No fracture. X-ray lumbar spine 8/26/19: Scoliosis.  39°.  No fracture. EMG B LE 12/5/19: This study is limited, but abnormal: There is limited electrodiagnostic evidence for a Left L5 lumbosacral motor radiculopathy. There is minimal denervation and a limited distribution of muscles involved on electromyography on today's study. There is electrodiagnostic evidence for a non-localized Right peroneal motor mononeuropathy. There is no conduction velocity slowing across the fibular head on multiple nerve conduction studies nor is there any active denervation on electromyography. 3. There is no current evidence for a generalized large fiber sensorimotor peripheral polyneuropathy.   EMG B LE 9/5/19: This study is limited, but abnormal: There is limited electrodiagnostic evidence for a non-localized Right peroneal mononeuropathy. There is no active denervation on electromyography. Although limited, there is no clear evidence for an active lumbosacral motor radiculopathy or generalized large fiber sensorimotor peripheral polyneuropathy.   XR LS Spine 8/21/17: thoracolumbar scoliosis. degenerative disc disease. Lumbar facet arthropathy and SI joints. No fracture. EMG B LE 9/1317: This study is borderline, but normal. There is no clear electrodiagnostic evidence for an active lumbosacral motor radiculopathy as clinically questioned. There is no evidence for a generalized large fiber sensorimotor peripheral polyneuropathy. MRI lumbar spine 7/28/17: Levo curvature lumbar spine dextro curvature thoracic spine.  Degenerative disc disease.  L5-S1 disc bulge, left disc bulge, facet changes, mild canal narrowing, severe left normal right foraminal narrowing.  L4-5 disc bulge, facet changes, mild canal narrowing, mild narrowing foramina bilaterally.   L3-4 disc bulge, facet changes, mild canal narrowing, moderate (pain)     Dispense:  30 tablet     Refill:  0       No orders of the defined types were placed in this encounter.    -Continue physical therapy as directed by Orthopedics  -Hold Nabumetone 500 mg BID at this time  -Increase Gabapentin 300 qHS to 600 mg TID #90 no ref start 3/23/2022 OARRS reviewed on 3/23/2022   -Continue Tizanidine 4 mg qHS #30 no ref start 3/23/2022 Avoid all other muscle relaxers and/or sedating medicines. -The patient is not forthright with her repeated attempts at medical marijuana use. She did not volunteer her recent prescription March 2022 of marijuana, she initially stated that she last used marijuana December 2020. Last two urine tests with marijuana present. Will hold on further opioids at this time.   -Naloxone prescribed on 3/23/2022 . MME less 8.75  -Encourage her to keep injections as ordered through Orthopedics    Controlled Substance Monitoring:    Acute and Chronic Pain Monitoring:   RX Monitoring 3/23/2022   Attestation -   Periodic Controlled Substance Monitoring Potential drug abuse or diversion identified, see note documentation. ;Assessed functional status. Chronic Pain > 50 MEDD -        Discussed the risks, side effects, and symptoms that would warrant urgent or emergent physician evaluation of all medications prescribed today. Provided education and counseling regarding the diagnosis, prognosis, and treatment options. All questions were answered. Encouraged her to follow-up with her primary care physician and/or specialists as required for her overall health and management of her comorbidities as well as any new positive symptoms mentioned in review of systems above. Care was provided within the definitions and limitations of our specialty practice. Encouraged lifestyle interventions including healthy habits, lifestyle changes, regular aerobic exercise and appropriate weight maintenance as advised by their primary care physician or cardiovascular health provider. Discussed well care and disease prevention/maintenance. All recommendations for medications are meant to help decrease pain, improve function with activities of daily living, maintain compliance with home exercise program, and improve quality of life. Encouraged compliance with her home exercise program. Discussed the elevated risks of excessive sedation while on pain medications. Advised her against driving or operating heavy machinery or performing any activities where she may harm herself or others while on pain medications. Particular caution was emphasized especially during dose adjustments and medication changes. Discussed the risks of temporary disability, permanent disability, morbidity, and mortality with poorly-managed or undiagnosed medical conditions and comorbidities. Emphasized the importance of timely medical evaluation and treatment as previously recommended by us or other medical professionals. Risks of not pursing these recommendations were emphasized. The patient was offered a treatment at our facility. The physician and patient have discussed in detail the risk of exposure to and/or potential harm posed by the COVID-19 virus with having office visits and procedures at this time versus the risk of delaying the visits and procedures. It is not possible to know either the risk of delaying the visits or procedure or chance of getting an infection with perfect accuracy, but a joint decision was made between the patient and the physician to proceed at this time with the scheduled visits and procedures. Advised her that any lab testing, imaging, or other diagnostic test results are best discussed in person in the office so that we can provide a clear explanation of their significance and best treatment based upon these results.  It is her responsibility to make and keep a follow up appointment to discuss these test results in person to discuss the significance of the findings and appropriate follow-up steps. She expressed complete understanding and agreement with the entire plan as outlined above. Portions of this note may have been typed, auto-populated, dictated or transcribed by voice recognition resulting in errors, omissions, or close substitutions which may be missed despite careful proofreading. Please contact the author for any questions or concerns. Follow up:  Return in about 1 month (around 4/23/2022) for reassessment of pain and symptoms.     Taylor Guzman MD

## 2022-04-24 DIAGNOSIS — G89.29 CHRONIC PAIN OF LEFT KNEE: ICD-10-CM

## 2022-04-24 DIAGNOSIS — M17.11 PRIMARY OSTEOARTHRITIS OF RIGHT KNEE: ICD-10-CM

## 2022-04-24 DIAGNOSIS — Z98.1 HISTORY OF LUMBAR SPINAL FUSION: ICD-10-CM

## 2022-04-24 DIAGNOSIS — M47.817 LUMBOSACRAL SPONDYLOSIS WITHOUT MYELOPATHY: ICD-10-CM

## 2022-04-24 DIAGNOSIS — M16.11 PRIMARY OSTEOARTHRITIS OF RIGHT HIP: ICD-10-CM

## 2022-04-24 DIAGNOSIS — M25.562 CHRONIC PAIN OF LEFT KNEE: ICD-10-CM

## 2022-04-25 RX ORDER — GABAPENTIN 600 MG/1
TABLET ORAL
Qty: 90 TABLET | Refills: 0 | OUTPATIENT
Start: 2022-04-25

## 2022-04-25 RX ORDER — TIZANIDINE 4 MG/1
TABLET ORAL
Qty: 30 TABLET | Refills: 0 | OUTPATIENT
Start: 2022-04-25

## 2022-04-27 ENCOUNTER — TELEPHONE (OUTPATIENT)
Dept: PAIN MANAGEMENT | Age: 62
End: 2022-04-27

## 2022-04-27 ENCOUNTER — OFFICE VISIT (OUTPATIENT)
Dept: PAIN MANAGEMENT | Age: 62
End: 2022-04-27
Payer: COMMERCIAL

## 2022-04-27 VITALS
TEMPERATURE: 97.6 F | BODY MASS INDEX: 33.37 KG/M2 | DIASTOLIC BLOOD PRESSURE: 86 MMHG | HEIGHT: 58 IN | SYSTOLIC BLOOD PRESSURE: 122 MMHG | WEIGHT: 159 LBS

## 2022-04-27 DIAGNOSIS — M17.11 PRIMARY OSTEOARTHRITIS OF RIGHT KNEE: ICD-10-CM

## 2022-04-27 DIAGNOSIS — M47.817 LUMBOSACRAL SPONDYLOSIS WITHOUT MYELOPATHY: ICD-10-CM

## 2022-04-27 DIAGNOSIS — Z98.1 HISTORY OF LUMBAR SPINAL FUSION: ICD-10-CM

## 2022-04-27 DIAGNOSIS — M54.16 LUMBAR RADICULOPATHY: Primary | ICD-10-CM

## 2022-04-27 DIAGNOSIS — M16.11 PRIMARY OSTEOARTHRITIS OF RIGHT HIP: ICD-10-CM

## 2022-04-27 DIAGNOSIS — G89.29 CHRONIC PAIN OF LEFT KNEE: ICD-10-CM

## 2022-04-27 DIAGNOSIS — M25.562 CHRONIC PAIN OF LEFT KNEE: ICD-10-CM

## 2022-04-27 PROCEDURE — 3017F COLORECTAL CA SCREEN DOC REV: CPT | Performed by: PHYSICAL MEDICINE & REHABILITATION

## 2022-04-27 PROCEDURE — 99214 OFFICE O/P EST MOD 30 MIN: CPT | Performed by: PHYSICAL MEDICINE & REHABILITATION

## 2022-04-27 PROCEDURE — 1036F TOBACCO NON-USER: CPT | Performed by: PHYSICAL MEDICINE & REHABILITATION

## 2022-04-27 PROCEDURE — G8417 CALC BMI ABV UP PARAM F/U: HCPCS | Performed by: PHYSICAL MEDICINE & REHABILITATION

## 2022-04-27 PROCEDURE — G8427 DOCREV CUR MEDS BY ELIG CLIN: HCPCS | Performed by: PHYSICAL MEDICINE & REHABILITATION

## 2022-04-27 RX ORDER — METHOCARBAMOL 500 MG/1
500 TABLET, FILM COATED ORAL 4 TIMES DAILY
Qty: 40 TABLET | Refills: 0 | Status: SHIPPED | OUTPATIENT
Start: 2022-04-27 | End: 2022-05-07

## 2022-04-27 RX ORDER — GABAPENTIN 800 MG/1
800 TABLET ORAL 4 TIMES DAILY
Qty: 120 TABLET | Refills: 0 | Status: SHIPPED | OUTPATIENT
Start: 2022-04-27 | End: 2022-06-02

## 2022-04-27 RX ORDER — TIZANIDINE 4 MG/1
4 TABLET ORAL NIGHTLY PRN
Qty: 30 TABLET | Refills: 0 | Status: CANCELLED | OUTPATIENT
Start: 2022-04-27 | End: 2022-05-27

## 2022-04-27 ASSESSMENT — ENCOUNTER SYMPTOMS
NAUSEA: 0
CONSTIPATION: 0
DIARRHEA: 0
BACK PAIN: 1
SHORTNESS OF BREATH: 0

## 2022-04-27 NOTE — TELEPHONE ENCOUNTER
SM- TRANSFORAMINAL AUTH- RIGHT L4-5,L5-S1 X1- 5/16/2022 TO 8/19/2022     OK to schedule procedure approved as above. Please note sides/levels approved and date range.    (If applicable, sides/levels approved may differ from those ordered)    TO BE SCHEDULED WITH DR Yury Lehman

## 2022-04-27 NOTE — TELEPHONE ENCOUNTER
AUTHORIZATION: RIGHT L4-5, L5-S1 TRANSFORAMINAL    INSURANCE: Asha Matthews [facility] VIA: PORTAL    AUTH #: [id number]    DATE RANGE: 5/16/2022 TO 8/19/2022     TELEPHONE CALL ROUTED TO MA TO SCHEDULE.

## 2022-04-27 NOTE — TELEPHONE ENCOUNTER
ORDER PLACED:    Date: 4/27/22  Description: RIGHT L4-5,L5-S1 TRANSFORAMINAL  Order Number: [id number]  Ordering Provider: [facility]  Performing Provider: [facility]  CPT Codes: 40187,45400  ICD10 Codes: M54.16    ORDER SENT TO [facility]

## 2022-04-27 NOTE — PROGRESS NOTES
Chasity Pineda  (1960)    4/27/2022    Subjective:     Chasity Pineda is 60 y.o. female who complains today of:    Chief Complaint   Patient presents with    Back Pain    Knee Pain     bilateral      Seen by me 3/23/22: had MRI done, seen by Dr Holden Saucedo, [facility] Told that she has a pinched nerve in her back by Ortho Spine surgery. She states that she was always on Gabapentin 600, requesting to be increased to 800 mg. She is requesting epidural injection for pinched nerve. She has never tried Robaxin. Tizanidine just makes her sleep, no pain relief. She is asking about different opioid pain medications. I discussed how I am unable to offer any further opioid pain medications. She is pleasant and understanding about our circumstances. No other tests, therapy or updates from other physicians, no emergency room visits. Gabapentin 600 mg TID and Tizanidine 4 mg qHS help with remaining functional with chores, personal hygiene, remaining compliant with home exercise program, maintaining her quality of life, and performing activities of daily living. She obtains greater than 50% pain relief without any significant side effects. She is clear to avoid driving or operating heavy machinery or to perform any activities where she may harm herself or others while on pain medications. Low back pain is currently a 6/10. It gets up to an 8/10. The pain is located in both sides of her low back and into her low back. She has pain into the right leg worse thant he leg pain. Currently her leg pain bothers her more than her back pain. The pain is worse with bending and activity. The pain is better with rest and pain medicine. She has a history of lumbar spine fusion L5/S1 with Dr. Holden Saucedo in May of 2020. It has been a constant ache for over two years. There are no other associated symptoms or contextual factors.  She denies any new weakness, saddle anesthesia, bowel or bladder dysfunction, or falls. Right knee is a 8/10. Left knee pain 4/10. It gets up to a 9/10. Worse with weather changes. Also with some pain in the right knee doing slightly better with right knee viscosupplementation. She is healing after a left total knee arthroplasty with Dr. Bird Flaherty. It has been a constant ache for years, slowly improving after surgery. Better with opioid pain medicine. There are no other associated symptoms or contextual factors. She denies any classic radicular symptoms, new weakness, saddle anesthesia, bowel or bladder dysfunction, or falls. Right hip pain is a 5/10. Gets to a 8/10. Worse with walking. Better with rest. Constant ache for over 1 year. There are no other associated symptoms or contextual factors. She denies any classic radicular symptoms, new weakness, saddle anesthesia, bowel or bladder dysfunction, or falls. Allergies:  Patient has no known allergies.     Past Medical History:   Diagnosis Date    Hypertension      Past Surgical History:   Procedure Laterality Date    KNEE ARTHROSCOPY Left 2018    TOTAL KNEE ARTHROPLASTY Left 2021     Family History   Problem Relation Age of Onset    Diabetes Mother     Breast Cancer Neg Hx     Cancer Neg Hx     Colon Cancer Neg Hx     Eclampsia Neg Hx     Hypertension Neg Hx     Ovarian Cancer Neg Hx      Labor Neg Hx     Spont Abortions Neg Hx     Stroke Neg Hx      Social History     Socioeconomic History    Marital status:      Spouse name: Not on file    Number of children: Not on file    Years of education: Not on file    Highest education level: Not on file   Occupational History    Not on file   Tobacco Use    Smoking status: Never Smoker    Smokeless tobacco: Never Used   Substance and Sexual Activity    Alcohol use: No    Drug use: No    Sexual activity: Yes   Other Topics Concern    Not on file   Social History Narrative    Not on file     Social Determinants of Health Financial Resource Strain:     Difficulty of Paying Living Expenses: Not on file   Food Insecurity:     Worried About Running Out of Food in the Last Year: Not on file    Jacob of Food in the Last Year: Not on file   Transportation Needs:     Lack of Transportation (Medical): Not on file    Lack of Transportation (Non-Medical): Not on file   Physical Activity:     Days of Exercise per Week: Not on file    Minutes of Exercise per Session: Not on file   Stress:     Feeling of Stress : Not on file   Social Connections:     Frequency of Communication with Friends and Family: Not on file    Frequency of Social Gatherings with Friends and Family: Not on file    Attends Jain Services: Not on file    Active Member of [address] [place] [address] [employer] or Organizations: Not on file    Attends Club or Organization Meetings: Not on file    Marital Status: Not on file   Intimate Partner Violence:     Fear of Current or Ex-Partner: Not on file    Emotionally Abused: Not on file    Physically Abused: Not on file    Sexually Abused: Not on file   Housing Stability:     Unable to Pay for Housing in the Last Year: Not on file    Number of [place] in the Last Year: Not on file    Unstable Housing in the Last Year: Not on file       Current Outpatient Medications on File Prior to Visit   Medication Sig Dispense Refill    diclofenac sodium (VOLTAREN) 1 % GEL Apply 4 g topically 2 times daily 100 g 0    atorvastatin (LIPITOR) 20 MG tablet TAKE ONE TABLET BY MOUTH EVERY DAY  5    lisinopril-hydrochlorothiazide (PRINZIDE;ZESTORETIC) 20-12.5 MG per tablet Take 1 tablet by mouth       No current facility-administered medications on file prior to visit. Back Pain  Pertinent negatives include no fever or headaches. Knee Pain         Review of Systems   Constitutional: Negative for fever. HENT: Negative for hearing loss. Respiratory: Negative for shortness of breath.     Gastrointestinal: Negative for constipation, diarrhea and nausea. Genitourinary: Negative for difficulty urinating. Musculoskeletal: Positive for back pain. Negative for neck pain. Skin: Negative for rash. Neurological: Negative for headaches. Hematological: Does not bruise/bleed easily. Psychiatric/Behavioral: Negative for sleep disturbance. Objective:     Vitals:  /86 (Site: Left Upper Arm, Position: Sitting, Cuff Size: Large Adult)   Temp 97.6 °F (36.4 °C) (Infrared)   Ht 4' 10\" (1.473 m)   Wt 159 lb (72.1 kg)   BMI 33.23 kg/m² Pain Score:   6      Exam performed under coronavirus precautions  General: No acute distress  Eyes: No scleral icterus or lid lag appreciated bilaterally  ENMT: Moist mucous membranes. Hearing grossly intact bilaterally. No masses or lesions on ears or nose  Neck: Symmetric without any overt masses or lesions, trachea midline  Respiratory: Respirations are non-labored  Skin: Visualized skin is intact  Psych: Mood normal. Affect normal. Recent and remote memory intact. Judgment and insight normal.  Neurologic: Gait antalgic, no assistive devices for ambulation. Pt is alert and appropriately interactive. Pleasant and cooperative with history and exam. No signs of excessive sedation. Responds promptly and appropriately to questions asked. No aberrant behaviors appreciated. Sore left knee anterior from total knee arthroplasty   Limited range of motion right hip  Tender lumbar paraspinals  Tender bilateral knee joint lines. Right L4 and L5 paresthesias with positive straight leg raise on exam today       Otherwise sensation grossly intact in both legs. Reflexes and strength functional for ambulation, no abnormal reflexes appreciated on exam today  Strength greater than 3/5 bilateral legs           XR right hip 3/22/2022 Dr. Kale Lozano: Mild joint space narrowing, no fracture.   XR R knee 2/16/2022: Up to severe medial joint space reduction, no fracture  Orthopedic surgery 2/1/2022: L5-S1 globus TLIF May 2020, L4-S1 laminectomy. New tests and refer to pain management for potential epidural injections. XR L-spine 2/1/2022 Dr. Gallito Whelan: MIS TLIF L5-S1, cage screws and rods are in good position. Mild spondylolisthesis L4 and L5. Diffuse degenerative changes above the level of fusion. Scoliosis 30 to 35 degrees unchanged. No fractures. Orthopedic surgery follow-up 7/21/2021: Jackie Labrador right knee. Orthopedic surgery follow-up 5/14/2021: 1 year follow-up L5-S1 TLIF and laminectomy. \"She says she is only 5% better\". X-rays show 35 degree scoliosis and if scoliosis worsen of a fusion might be optimal option. The patient said absolutely not, Alethea Thakkar is never going to\"  XR LS spine 5/14/2021: [place] for orthopedics read. Diffuse degenerative changes, MIS T LIF L5-S1 with good positioning of cage around screws and rods, no hardware failure, mild spondylolisthesis L4 and L5, 35 degree degenerative scoliosis thoracolumbar spine, no fractures, hips partially visualized some moderate bilateral hip arthritis. MRI LS-spine 02/12/2020:  Degenerative disc disease and facet arthropathy. T12 to L4 normal canal and foramen. L4-5 mild canal stenosis, mild bilateral foraminal narrowing. L5-S1 moderate left foraminal narrowing, mild canal.    XR LS-spine 06/02/2020:  MSI TLIF L5-S1. Screws and cage in good position. Scoliosis above the level of fusion. No new fractures. No instability.  read Dr. Gallito Whelan. XR bilateral knees 10/01/2020:  Mild right moderate left medial joint space reduction. No fracture. X-ray lumbar spine 8/26/19: Scoliosis.  39°.  No fracture. EMG B LE 12/5/19: This study is limited, but abnormal: There is limited electrodiagnostic evidence for a Left L5 lumbosacral motor radiculopathy. There is minimal denervation and a limited distribution of muscles involved on electromyography on today's study. There is electrodiagnostic evidence for a non-localized Right peroneal motor mononeuropathy. There is no conduction velocity slowing across the fibular head on multiple nerve conduction studies nor is there any active denervation on electromyography. 3. There is no current evidence for a generalized large fiber sensorimotor peripheral polyneuropathy.   EMG B LE 9/5/19: This study is limited, but abnormal: There is limited electrodiagnostic evidence for a non-localized Right peroneal mononeuropathy. There is no active denervation on electromyography. Although limited, there is no clear evidence for an active lumbosacral motor radiculopathy or generalized large fiber sensorimotor peripheral polyneuropathy.   XR LS Spine 8/21/17: thoracolumbar scoliosis. degenerative disc disease. Lumbar facet arthropathy and SI joints. No fracture. EMG B LE 9/1317: This study is borderline, but normal. There is no clear electrodiagnostic evidence for an active lumbosacral motor radiculopathy as clinically questioned. There is no evidence for a generalized large fiber sensorimotor peripheral polyneuropathy. MRI lumbar spine 7/28/17: Levo curvature lumbar spine dextro curvature thoracic spine.  Degenerative disc disease.  L5-S1 disc bulge, left disc bulge, facet changes, mild canal narrowing, severe left normal right foraminal narrowing.  L4-5 disc bulge, facet changes, mild canal narrowing, mild narrowing foramina bilaterally.  L3-4 disc bulge, facet changes, mild canal narrowing, moderate right and mild left foraminal narrowing.  L2-3 disc bulge, facet changes, mild canal narrowing, mild right foraminal normal left foraminal narrowing.  L1-2 disc bulge, facet changes, normal canal and foramen.  T12-L1 facet changes, disc bulge, normal canal, mild right foraminal narrowing.     UDS 2/23/2022: Positive marijuana, consistent with gabapentin  UDS 8/25/2021: Positive marijuana, positive ethyl, negative for hydrocodone and gabapentin  UDS 02/55/1847:  Free of illicits, consistent with Percocet and Gabapentin.    UDS 09/03/2020: Free of illicits, consistent with Percocet. UDS 1/13/20: +phentermine (weight loss supplement), +Gabapentin  Opioid risk tool score 1, low risk   Assessment:      Diagnosis Orders   1. Lumbar radiculopathy  SD NJX AA&/STRD TFRML EPI LUMBAR/SACRAL 1 LEVEL   2. Lumbosacral spondylosis without myelopathy  gabapentin (NEURONTIN) 800 MG tablet    methocarbamol (ROBAXIN) 500 MG tablet   3. Chronic pain of left knee  gabapentin (NEURONTIN) 800 MG tablet    methocarbamol (ROBAXIN) 500 MG tablet   4. Primary osteoarthritis of right hip  gabapentin (NEURONTIN) 800 MG tablet    methocarbamol (ROBAXIN) 500 MG tablet   5. Primary osteoarthritis of right knee  gabapentin (NEURONTIN) 800 MG tablet    methocarbamol (ROBAXIN) 500 MG tablet   6. History of lumbar spinal fusion  gabapentin (NEURONTIN) 800 MG tablet     -Never received Lidocaine. Gabapentin 600 mg cog dysfunction. Diclofenac 75mg BID, Nabumetone 500 mg BID, Cyclobenzaprine 10 mg BID, Tizanidine 4 mg, Meloxicam 15mg, Tramadol min relief.  Medrol short term relief.   Plan:     Periodic Controlled Substance Monitoring: Assessed functional status. Mert Johnson MD)    Orders Placed This Encounter   Medications    gabapentin (NEURONTIN) 800 MG tablet     Sig: Take 1 tablet by mouth 4 times daily for 30 days. Dispense:  120 tablet     Refill:  0    methocarbamol (ROBAXIN) 500 MG tablet     Sig: Take 1 tablet by mouth 4 times daily for 10 days     Dispense:  40 tablet     Refill:  0       Orders Placed This Encounter   Procedures    SD NJX AA&/STRD TFRML EPI LUMBAR/SACRAL 1 LEVEL     Right Lumbar L4/5 and L5/S1 transforaminal epidural steroid injection under Xr with Dr Harriet Schaffer. No anticoagulation, no antibiotics, no diabetes mellitus, no osteoporosis, no bleeding or platelet dysfunction, IV Dye okANG diazDA. 30 minute procedure     Standing Status:   Future     Standing Expiration Date:   7/26/2022     -Continue physical therapy as directed by Orthopedics.  Encourage her to seek Ortho opinion [facility] as directed by Ortho Surgery  -Obtain MRI LS Spine report from [address] Nabumetone 500 mg BID at this time  -Increase Gabapentin 600 mg TID to 800 mg QID #120 no ref start 4/27/2022 [place] reviewed on 4/27/2022   -D/c Tizanidine 4 mg min relief  -Trial Robaxin Methocarbamol 500 mg QID prn 10 day trial #40 no ref start 4/27/2022 Avoid all other muscle relaxers and/or sedating medicines.  -No further opioids recommended at this time due to repeated use of marijuana without full disclosure.   -Naloxone prescribed on 4/27/2022 . MME less 8.75  -Right Lumbar L4/5 and L5/S1 transforaminal epidural steroid injection under Xr with Dr Cristine Hernandez. No anticoagulation, no antibiotics, no diabetes mellitus, no osteoporosis, no bleeding or platelet dysfunction, IV Dye okay, NKDA. 30 minute procedure. Consider 10 mg dexamethasone, subpedicular approach. Fused L5/S1       Controlled Substance Monitoring:    Acute and Chronic Pain Monitoring:   RX Monitoring 4/27/2022   Attestation -   Periodic Controlled Substance Monitoring Assessed functional status. Chronic Pain > 50 MEDD -        Discussed the risks, side effects, and symptoms that would warrant urgent or emergent physician evaluation of all medications prescribed today. Provided education and counseling regarding the diagnosis, prognosis, and treatment options. All questions were answered. Encouraged her to follow-up with her primary care physician and/or specialists as required for her overall health and management of her comorbidities as well as any new positive symptoms mentioned in review of systems above. Care was provided within the definitions and limitations of our specialty practice. Encouraged lifestyle interventions including healthy habits, lifestyle changes, regular aerobic exercise and appropriate weight maintenance as advised by their primary care physician or cardiovascular health provider.  Discussed well care and disease prevention/maintenance. All recommendations for medications are meant to help decrease pain, improve function with activities of daily living, maintain compliance with home exercise program, and improve quality of life. Encouraged compliance with her home exercise program. Discussed the elevated risks of excessive sedation while on pain medications. Advised her against driving or operating heavy machinery or performing any activities where she may harm herself or others while on pain medications. Particular caution was emphasized especially during dose adjustments and medication changes. Discussed the risks of temporary disability, permanent disability, morbidity, and mortality with poorly-managed or undiagnosed medical conditions and comorbidities. Emphasized the importance of timely medical evaluation and treatment as previously recommended by us or other medical professionals. Risks of not pursing these recommendations were emphasized. The patient was offered a treatment at our facility. The physician and patient have discussed in detail the risk of exposure to and/or potential harm posed by the COVID-19 virus with having office visits and procedures at this time versus the risk of delaying the visits and procedures. It is not possible to know either the risk of delaying the visits or procedure or chance of getting an infection with perfect accuracy, but a joint decision was made between the patient and the physician to proceed at this time with the scheduled visits and procedures. Advised her that any lab testing, imaging, or other diagnostic test results are best discussed in person in the office so that we can provide a clear explanation of their significance and best treatment based upon these results. It is her responsibility to make and keep a follow up appointment to discuss these test results in person to discuss the significance of the findings and appropriate follow-up steps.  She expressed complete understanding and agreement with the entire plan as outlined above. Portions of this note may have been typed, auto-populated, dictated or transcribed by voice recognition resulting in errors, omissions, or close substitutions which may be missed despite careful proofreading. Please contact the author for any questions or concerns. Follow up:  Return in about 1 month (around 5/27/2022) for reassessment of pain and symptoms.     Meggan Guzman MD 5

## 2022-04-28 NOTE — TELEPHONE ENCOUNTER
Right Lumbar L4/5 and L5/S1 transforaminal epidural steroid injection under Xr with Dr Heydi Tejeda. No anticoagulation, no antibiotics, no diabetes mellitus, no osteoporosis, no bleeding or platelet dysfunction, IV Dye DAKOTA alves.  30 minute procedure

## 2022-06-02 ENCOUNTER — OFFICE VISIT (OUTPATIENT)
Dept: PAIN MANAGEMENT | Age: 62
End: 2022-06-02
Payer: COMMERCIAL

## 2022-06-02 VITALS
DIASTOLIC BLOOD PRESSURE: 80 MMHG | HEIGHT: 58 IN | TEMPERATURE: 97.1 F | WEIGHT: 158 LBS | SYSTOLIC BLOOD PRESSURE: 122 MMHG | BODY MASS INDEX: 33.17 KG/M2

## 2022-06-02 DIAGNOSIS — M16.11 PRIMARY OSTEOARTHRITIS OF RIGHT HIP: Primary | ICD-10-CM

## 2022-06-02 DIAGNOSIS — Z98.1 HISTORY OF LUMBAR SPINAL FUSION: ICD-10-CM

## 2022-06-02 DIAGNOSIS — M48.061 FORAMINAL STENOSIS OF LUMBAR REGION: ICD-10-CM

## 2022-06-02 PROCEDURE — 1036F TOBACCO NON-USER: CPT | Performed by: NURSE PRACTITIONER

## 2022-06-02 PROCEDURE — 99214 OFFICE O/P EST MOD 30 MIN: CPT | Performed by: NURSE PRACTITIONER

## 2022-06-02 PROCEDURE — G8427 DOCREV CUR MEDS BY ELIG CLIN: HCPCS | Performed by: NURSE PRACTITIONER

## 2022-06-02 PROCEDURE — G8417 CALC BMI ABV UP PARAM F/U: HCPCS | Performed by: NURSE PRACTITIONER

## 2022-06-02 PROCEDURE — 3017F COLORECTAL CA SCREEN DOC REV: CPT | Performed by: NURSE PRACTITIONER

## 2022-06-02 RX ORDER — GABAPENTIN 600 MG/1
600 TABLET ORAL 2 TIMES DAILY
Qty: 60 TABLET | Refills: 0 | Status: SHIPPED | OUTPATIENT
Start: 2022-06-02 | End: 2022-07-07 | Stop reason: SDUPTHER

## 2022-06-02 ASSESSMENT — ENCOUNTER SYMPTOMS
DIARRHEA: 0
BACK PAIN: 1
CONSTIPATION: 0
RESPIRATORY NEGATIVE: 1

## 2022-06-02 NOTE — PROGRESS NOTES
Patient: Mikey Langston  YOB: 1960  Date: 22        Subjective:     Mikey Langston is a 60 y.o. female who complains today of:    Chief Complaint   Patient presents with    Back Pain         Allergies:  Patient has no known allergies. Past Medical History:   Diagnosis Date    Hypertension      Past Surgical History:   Procedure Laterality Date    KNEE ARTHROSCOPY Left 2018    TOTAL KNEE ARTHROPLASTY Left 2021     Family History   Problem Relation Age of Onset    Diabetes Mother     Breast Cancer Neg Hx     Cancer Neg Hx     Colon Cancer Neg Hx     Eclampsia Neg Hx     Hypertension Neg Hx     Ovarian Cancer Neg Hx      Labor Neg Hx     Spont Abortions Neg Hx     Stroke Neg Hx      Social History     Socioeconomic History    Marital status:      Spouse name: Not on file    Number of children: Not on file    Years of education: Not on file    Highest education level: Not on file   Occupational History    Not on file   Tobacco Use    Smoking status: Never Smoker    Smokeless tobacco: Never Used   Substance and Sexual Activity    Alcohol use: No    Drug use: No    Sexual activity: Yes   Other Topics Concern    Not on file   Social History Narrative    Not on file     Social Determinants of Health     Financial Resource Strain:     Difficulty of Paying Living Expenses: Not on file   Food Insecurity:     Worried About Running Out of Food in the Last Year: Not on file    Jacob of Food in the Last Year: Not on file   Transportation Needs:     Lack of Transportation (Medical): Not on file    Lack of Transportation (Non-Medical):  Not on file   Physical Activity:     Days of Exercise per Week: Not on file    Minutes of Exercise per Session: Not on file   Stress:     Feeling of Stress : Not on file   Social Connections:     Frequency of Communication with Friends and Family: Not on file    Frequency of Social Gatherings with Friends and Family: Not on file    Attends Denominational Services: Not on file    Active Member of Clubs or Organizations: Not on file    Attends Club or Organization Meetings: Not on file    Marital Status: Not on file   Intimate Partner Violence:     Fear of Current or Ex-Partner: Not on file    Emotionally Abused: Not on file    Physically Abused: Not on file    Sexually Abused: Not on file   Housing Stability:     Unable to Pay for Housing in the Last Year: Not on file    Number of [place] in the Last Year: Not on file    Unstable Housing in the Last Year: Not on file       Current Outpatient Medications on File Prior to Visit   Medication Sig Dispense Refill    diclofenac sodium (VOLTAREN) 1 % GEL Apply 4 g topically 2 times daily 100 g 0    atorvastatin (LIPITOR) 20 MG tablet TAKE ONE TABLET BY MOUTH EVERY DAY  5    lisinopril-hydrochlorothiazide (PRINZIDE;ZESTORETIC) 20-12.5 MG per tablet Take 1 tablet by mouth       No current facility-administered medications on file prior to visit. Patient follows for chronic knee and low back pain. Back pain 7/10, knees not too bad. She does not want to have surgery as recommend by Dr Glen Marte because she says the previous surgeries did not \"do me any good\". We restarted narcotic couple months ago because she said she stopped marijuana however it was found in her urine and it was also found in OARRS that she is continuing to get it prescribed. Today she asked me when she can start her tramadol again. And I reiterated that narcotics are not a choice for her due to her history. She had also asked Dr. Cesia Quinteor for an increase of her gabapentin which was done last month to 800 mg 4 times daily. Today she says she took it for 2 days and it made her feel weird so she stopped and wants 600 mg twice daily. She also did not start the Robaxin given last month, she did not even know that it was prescribed. Her comprehension today seems somewhat amiss.   She also said she does not want anything done to her back and is canceling the epidural.  She seems to think that we have done this I have documented it was done at [facility]. She does not want any knee surgery at this point. Random urine drug screen done 8/2021 positive for THC. Discussed with patient for past few months that she needs to choose between medical marijuana and narcotics. She has her medical marijuana card and uses Gummies. She also uses CBD with THC cream for her left knee.          Doing well after a left total knee arthroplasty done by Dr. Chidi Fields for Orthopedics, on 03/08/2021. Had follow-up appointment with Dr. Radha Beltran on May 12, 2021 and he said her left knee replacement is doing well, the right knee was injected with steroid but it does not need any surgery anytime soon. She also gets Ambien from Dr. Delroy Santana and will take a half for sleep as needed. Uses phentermine. Medications help with remaining functional with chores, personal hygiene, remaining compliant with home exercise program, maintaining her quality of life, and performing activities of daily living. She obtains greater than 50% pain relief without any significant side effects. She is clear to avoid driving or operating heavy machinery or to perform any activities where she may harm herself or others while on pain medications.     Low back pain is currently a 6/10. It gets up to an 8/10. The pain is located in both sides of her low back and into her low back. The pain is worse with bending and activity. The pain is better with rest and pain medicine. She has a history of lumbar spine fusion with Dr. Radha Beltran in May of 2020. It has been a constant ache for over two years. There are no other associated symptoms or contextual factors. She denies any classic radicular symptoms, new weakness, saddle anesthesia, bowel or bladder dysfunction, or falls. There are no other associated symptoms or contextual factors.  She denies any classic radicular symptoms, new weakness, saddle anesthesia, bowel or bladder dysfunction, or falls.          Back Pain  Pertinent negatives include no headaches, numbness or weakness. Review of Systems   Constitutional: Negative. Negative for activity change and unexpected weight change. HENT: Negative. Negative for hearing loss. Respiratory: Negative. Cardiovascular: Negative for leg swelling. Gastrointestinal: Negative for constipation and diarrhea. Genitourinary: Negative. Musculoskeletal: Positive for arthralgias and back pain. Negative for gait problem, joint swelling and neck pain. Skin: Negative for rash. Neurological: Negative for dizziness, weakness, numbness and headaches. Psychiatric/Behavioral: Negative. Negative for sleep disturbance. Objective:     Vitals:  /80 (Site: Right Upper Arm)   Temp 97.1 °F (36.2 °C) (Temporal)   Ht 4' 10\" (1.473 m)   Wt 158 lb (71.7 kg)   BMI 33.02 kg/m² Pain Score:   7    Physical Exam  Vitals reviewed. Constitutional:       Appearance: She is well-developed. HENT:      Head: Normocephalic. Nose: Nose normal.   Pulmonary:      Effort: Pulmonary effort is normal.   Musculoskeletal:      Cervical back: Normal range of motion and neck supple. Lumbar back: Tenderness present. Decreased range of motion. Positive right straight leg raise test. Negative left straight leg raise test.   Lymphadenopathy:      Cervical: No cervical adenopathy. Skin:     General: Skin is warm and dry. Findings: No erythema or rash. Neurological:      Mental Status: She is alert and oriented to person, place, and time. Cranial Nerves: No cranial nerve deficit. Deep Tendon Reflexes: Reflexes are normal and symmetric. Reflexes normal.   Psychiatric:         Mood and Affect: Mood normal.         Behavior: Behavior normal.         Thought Content:  Thought content normal.         Judgment: Judgment normal. Assessment:        Diagnosis Orders   1. Primary osteoarthritis of right hip     2. History of lumbar spinal fusion     3. Foraminal stenosis of lumbar region         Plan:          Orders Placed This Encounter   Medications    gabapentin (NEURONTIN) 600 MG tablet     Sig: Take 1 tablet by mouth 2 times daily for 30 days. Dispense:  60 tablet     Refill:  0       No orders of the defined types were placed in this encounter.    -I reduced her gabapentin from 800 mg 4 times daily to 600 mg twice daily. She has not had any gabapentin for about 28 days.  -She will try the Robaxin given to her last month if needed  -She declines PT referral and says she will get it from Dr. Jed Kay  -Suggested she ask her PCP to take over the gabapentin since she will not be getting any procedures with us or narcotics. Discussed options with the patient today. Anatomic model pathology was shown and reviewed with pt. All questions were answered. Relevant imaging reviewed, NDP reviewed and pain generators reviewed. Pt verbalized understanding and agrees with above plan. Will continue medications as they do help pt function with ADL and improve quality of life. Pt understands potential side effects from opioids such as constipation, dry mouth, dizziness, opioid use disorder, and overdose. Pt has failed non opioid therapy and decision was made to prescribe opioids to help with daily function and improve quality of life. Discussed the principles of opioid tolerance as well as the concerns regarding opioid diversion, misuse, and abuse.      Discussed the risks of respiratory depression and death while on pain medications, especially when combined with other sedative substances.     Discussed the elevated risks of excessive sedation while on pain medications. Advised him against driving or operating heavy machinery or performing any activities where he may harm himself or others while on pain medications.  Particular caution was emphasized especially during dose adjustments and medication changes.      [place] was reviewed. UTOX from 2/23/22 + THC; ORT score = 1  Narcan prescribed 4/2021 and understands the proper use in the event of an overdose. This NP saw pt under direct supervision of Dr Corinne Kilpatrick. Follow up:  Return in about 4 weeks (around 6/30/2022) for review meds and reassess pain.     Lux Smith, KASANDRA - CNP

## 2022-07-07 ENCOUNTER — OFFICE VISIT (OUTPATIENT)
Dept: PAIN MANAGEMENT | Age: 62
End: 2022-07-07
Payer: COMMERCIAL

## 2022-07-07 VITALS — TEMPERATURE: 98.2 F | WEIGHT: 158 LBS | HEIGHT: 58 IN | BODY MASS INDEX: 33.17 KG/M2

## 2022-07-07 DIAGNOSIS — M16.11 PRIMARY OSTEOARTHRITIS OF RIGHT HIP: ICD-10-CM

## 2022-07-07 DIAGNOSIS — M47.817 LUMBOSACRAL SPONDYLOSIS WITHOUT MYELOPATHY: Primary | ICD-10-CM

## 2022-07-07 DIAGNOSIS — M48.061 FORAMINAL STENOSIS OF LUMBAR REGION: ICD-10-CM

## 2022-07-07 PROCEDURE — 99214 OFFICE O/P EST MOD 30 MIN: CPT | Performed by: NURSE PRACTITIONER

## 2022-07-07 PROCEDURE — G8427 DOCREV CUR MEDS BY ELIG CLIN: HCPCS | Performed by: NURSE PRACTITIONER

## 2022-07-07 PROCEDURE — G8417 CALC BMI ABV UP PARAM F/U: HCPCS | Performed by: NURSE PRACTITIONER

## 2022-07-07 PROCEDURE — 1036F TOBACCO NON-USER: CPT | Performed by: NURSE PRACTITIONER

## 2022-07-07 PROCEDURE — 3017F COLORECTAL CA SCREEN DOC REV: CPT | Performed by: NURSE PRACTITIONER

## 2022-07-07 RX ORDER — GABAPENTIN 600 MG/1
600 TABLET ORAL 2 TIMES DAILY
Qty: 60 TABLET | Refills: 0 | Status: SHIPPED | OUTPATIENT
Start: 2022-07-07 | End: 2022-08-29

## 2022-07-07 RX ORDER — LIDOCAINE 50 MG/G
1 PATCH TOPICAL DAILY
Qty: 30 PATCH | Refills: 0 | Status: SHIPPED | OUTPATIENT
Start: 2022-07-07 | End: 2022-08-06

## 2022-07-07 ASSESSMENT — ENCOUNTER SYMPTOMS
RESPIRATORY NEGATIVE: 1
DIARRHEA: 0
CONSTIPATION: 0
BACK PAIN: 1

## 2022-07-07 NOTE — PROGRESS NOTES
Patient: Rafael Burgos  YOB: 1960  Date: 22        Subjective:     Rafael Burgos is a 60 y.o. female who complains today of:    Chief Complaint   Patient presents with    Back Pain    Hip Pain         Allergies:  Patient has no known allergies. Past Medical History:   Diagnosis Date    Hypertension      Past Surgical History:   Procedure Laterality Date    KNEE ARTHROSCOPY Left 2018    TOTAL KNEE ARTHROPLASTY Left 2021     Family History   Problem Relation Age of Onset    Diabetes Mother     Breast Cancer Neg Hx     Cancer Neg Hx     Colon Cancer Neg Hx     Eclampsia Neg Hx     Hypertension Neg Hx     Ovarian Cancer Neg Hx      Labor Neg Hx     Spont Abortions Neg Hx     Stroke Neg Hx      Social History     Socioeconomic History    Marital status:      Spouse name: Not on file    Number of children: Not on file    Years of education: Not on file    Highest education level: Not on file   Occupational History    Not on file   Tobacco Use    Smoking status: Never Smoker    Smokeless tobacco: Never Used   Substance and Sexual Activity    Alcohol use: No    Drug use: No    Sexual activity: Yes   Other Topics Concern    Not on file   Social History Narrative    Not on file     Social Determinants of Health     Financial Resource Strain:     Difficulty of Paying Living Expenses: Not on file   Food Insecurity:     Worried About Running Out of Food in the Last Year: Not on file    Jacob of Food in the Last Year: Not on file   Transportation Needs:     Lack of Transportation (Medical): Not on file    Lack of Transportation (Non-Medical):  Not on file   Physical Activity:     Days of Exercise per Week: Not on file    Minutes of Exercise per Session: Not on file   Stress:     Feeling of Stress : Not on file   Social Connections:     Frequency of Communication with Friends and Family: Not on file    Frequency of Social Gatherings with Friends and Family: Not on file    Attends Christian Services: Not on file    Active Member of Clubs or Organizations: Not on file    Attends Club or Organization Meetings: Not on file    Marital Status: Not on file   Intimate Partner Violence:     Fear of Current or Ex-Partner: Not on file    Emotionally Abused: Not on file    Physically Abused: Not on file    Sexually Abused: Not on file   Housing Stability:     Unable to Pay for Housing in the Last Year: Not on file    Number of [place] in the Last Year: Not on file    Unstable Housing in the Last Year: Not on file       Current Outpatient Medications on File Prior to Visit   Medication Sig Dispense Refill    diclofenac sodium (VOLTAREN) 1 % GEL Apply 4 g topically 2 times daily 100 g 0    atorvastatin (LIPITOR) 20 MG tablet TAKE ONE TABLET BY MOUTH EVERY DAY  5    lisinopril-hydrochlorothiazide (PRINZIDE;ZESTORETIC) 20-12.5 MG per tablet Take 1 tablet by mouth       No current facility-administered medications on file prior to visit. Patient follows for chronic knee and low back pain. Back pain 7/10, knees not too bad. She does not want to have surgery as recommend by Dr Johnson Prader because she says the previous surgeries did not \"do me any good\". We restarted narcotic couple months ago because she said she stopped marijuana however it was found in her urine and it was also found in [place] that she is continuing to get it prescribed. She hadpreviously asked Dr. Amy Harp for an increase of her gabapentin which was done to 800 mg 4 times daily. Today she says she took it for 2 days and it made her feel weird so she stopped and wants 600 mg twice daily. I restarted that last month. She also said she did not want anything done to her back so she cancelled the epidural.  She seems to think that we have done this I have documented it was done at [facility]. She does not want any knee surgery at this point.   Random urine drug screen done 8/2021 positive for THC. Discussed with patient for past few months that she needs to choose between medical marijuana and narcotics. She has her medical marijuana card and uses Gummies. She also uses CBD with THC cream for her left knee.          Doing well after a left total knee arthroplasty done by Dr. Quiana Sears for Orthopedics, on 03/08/2021. Had follow-up appointment with Dr. Danette Ramirez on May 12, 2021 and he said her left knee replacement is doing well, the right knee was injected with steroid but it does not need any surgery anytime soon. She also gets Ambien from Dr. Brittani Shipley and will take a half for sleep as needed. Uses phentermine. Medications help with remaining functional with chores, personal hygiene, remaining compliant with home exercise program, maintaining her quality of life, and performing activities of daily living. She obtains greater than 50% pain relief without any significant side effects. She is clear to avoid driving or operating heavy machinery or to perform any activities where she may harm herself or others while on pain medications.     Low back pain is currently a 6/10. It gets up to an 8/10. The pain is located in both sides of her low back and into her low back. The pain is worse with bending and activity. The pain is better with rest and pain medicine. She has a history of lumbar spine fusion with Dr. Danette Ramirez in May of 2020. It has been a constant ache for over two years. There are no other associated symptoms or contextual factors. She denies any classic radicular symptoms, new weakness, saddle anesthesia, bowel or bladder dysfunction, or falls. There are no other associated symptoms or contextual factors. She denies any classic radicular symptoms, new weakness, saddle anesthesia, bowel or bladder dysfunction, or falls.          Back Pain  Pertinent negatives include no headaches, numbness or weakness.    Hip Pain   Pertinent negatives include no numbness. Review of Systems   Constitutional: Negative. Negative for activity change and unexpected weight change. HENT: Negative. Negative for hearing loss. Respiratory: Negative. Cardiovascular: Negative for leg swelling. Gastrointestinal: Negative for constipation and diarrhea. Genitourinary: Negative. Musculoskeletal: Positive for arthralgias and back pain. Negative for gait problem, joint swelling and neck pain. Skin: Negative for rash. Neurological: Negative for dizziness, weakness, numbness and headaches. Psychiatric/Behavioral: Negative. Negative for sleep disturbance. Objective:     Vitals:  Temp 98.2 °F (36.8 °C)   Ht 4' 10\" (1.473 m)   Wt 158 lb (71.7 kg)   BMI 33.02 kg/m² Pain Score:   5    Physical Exam  Vitals reviewed. Constitutional:       Appearance: She is well-developed. HENT:      Head: Normocephalic. Nose: Nose normal.   Pulmonary:      Effort: Pulmonary effort is normal.   Musculoskeletal:      Cervical back: Normal range of motion and neck supple. Lumbar back: Tenderness present. Decreased range of motion. Negative right straight leg raise test and negative left straight leg raise test.      Right knee: Swelling and effusion present. Tenderness present. Left knee: Swelling and effusion present. Tenderness present. Lymphadenopathy:      Cervical: No cervical adenopathy. Skin:     General: Skin is warm and dry. Findings: No erythema or rash. Neurological:      Mental Status: She is alert and oriented to person, place, and time. Cranial Nerves: No cranial nerve deficit. Deep Tendon Reflexes: Reflexes are normal and symmetric. Reflexes normal.   Psychiatric:         Mood and Affect: Mood normal.         Behavior: Behavior normal.         Thought Content: Thought content normal.         Judgment: Judgment normal.            Assessment:        Diagnosis Orders   1.  Lumbosacral spondylosis without myelopathy  SD INJ DX/THER AGNT PARAVERT FACET JOINT, LUMBAR/SAC, 1ST LEVEL    ME INJ DX/THER AGNT PARAVERT FACET JOINT, LUMBAR/SAC, 2ND LEVEL    ME INJ DX/THER AGNT PARAVERT FACET JOINT, LUMBAR/SAC, ADD LEVEL   2. Primary osteoarthritis of right hip     3. Foraminal stenosis of lumbar region         Plan:     Periodic Controlled Substance Monitoring: Possible medication side effects, risk of tolerance/dependence & alternative treatments discussed. ,No signs of potential drug abuse or diversion identified. ,Assessed functional status. Stephany Nelson, APRN - CNP)    Orders Placed This Encounter   Medications    gabapentin (NEURONTIN) 600 MG tablet     Sig: Take 1 tablet by mouth 2 times daily for 30 days. Dispense:  60 tablet     Refill:  0    lidocaine (LIDODERM) 5 %     Sig: Place 1 patch onto the skin daily 12 hours on, 12 hours off. Dispense:  30 patch     Refill:  0       Orders Placed This Encounter   Procedures    ME INJ DX/THER AGNT PARAVERT FACET JOINT, LUMBAR/SAC, 1ST LEVEL     MBB #1  (345) PLIF L5-S1 with SM     Standing Status:   Future     Standing Expiration Date:   10/5/2022    ME INJ DX/THER AGNT PARAVERT FACET JOINT, LUMBAR/SAC, 2ND LEVEL     Standing Status:   Future     Standing Expiration Date:   10/5/2022    ME INJ DX/THER AGNT PARAVERT FACET JOINT, LUMBAR/SAC, ADD LEVEL     Standing Status:   Future     Standing Expiration Date:   10/5/2022     We will go ahead and order diagnostic bilateral L2, L3, L4, L5 medial branch blocks to see if the patient is a candidate for RF ablation. she has failed conservative treatment in the past. Anatomic model of pathology was shown. Risks and benefits of the procedure were discussed. All questions were answered and patient understands and agrees with the plan. States she completed PT at [facility] in February, had 5 sessions here. .   -refill gabapentin 600mg BID #60  -trial Lidoderm 5% patch for knees      Follow up:  Return in about 4 weeks (around 8/4/2022) for

## 2022-07-08 ENCOUNTER — TELEPHONE (OUTPATIENT)
Dept: PAIN MANAGEMENT | Age: 62
End: 2022-07-08

## 2022-07-08 NOTE — TELEPHONE ENCOUNTER
ORDER PLACED:    Date: 7/7/22  Description: BILAT L2,3,4,5 MBB (PLIF L5-S1)  Order Number: [id number]  Ordering Provider: Isabel Claros  Performing Provider: [facility]  CPT Codes: 10240,86926,45483  ICD10 Codes: T47.416

## 2022-07-15 NOTE — TELEPHONE ENCOUNTER
AUTHORIZATION: LILI L2,3,4,5 MBB (PLIF L5-S1)    INSURANCE: CRS  AUTH RCVD VIA: [employer] #: [id number]    DATE RANGE: 8/2/22-11/2/22    TELEPHONE CALL ROUTED TO MA TO SCHEDULE.

## 2022-07-15 NOTE — TELEPHONE ENCOUNTER
BILAT L2,3,4,5 MBB (PLIF L5-S1)    AUTH APPROVED FROM 8/2/22-11/2/22    OK to schedule procedure approved as above. Please note sides/levels approved and date range. (If applicable, sides/levels approved may differ from those ordered)    TO BE SCHEDULED WITH DR. Zee Pavon

## 2022-07-21 NOTE — TELEPHONE ENCOUNTER
Surgery Scheduling (-BILAT L2,3,4,5 MBB (PLIF L5-S1)-AUTH APPROVED 8/2/22-11/2/22)      PATIENT IS SCHEDULED

## 2022-08-29 ENCOUNTER — OFFICE VISIT (OUTPATIENT)
Dept: OBGYN CLINIC | Age: 62
End: 2022-08-29
Payer: COMMERCIAL

## 2022-08-29 VITALS
BODY MASS INDEX: 34.63 KG/M2 | SYSTOLIC BLOOD PRESSURE: 116 MMHG | DIASTOLIC BLOOD PRESSURE: 68 MMHG | HEIGHT: 58 IN | WEIGHT: 165 LBS

## 2022-08-29 DIAGNOSIS — Z12.31 SCREENING MAMMOGRAM FOR BREAST CANCER: ICD-10-CM

## 2022-08-29 DIAGNOSIS — Z01.419 ENCOUNTER FOR WELL WOMAN EXAM WITH ROUTINE GYNECOLOGICAL EXAM: Primary | ICD-10-CM

## 2022-08-29 DIAGNOSIS — G47.00 INSOMNIA, UNSPECIFIED TYPE: ICD-10-CM

## 2022-08-29 PROCEDURE — 99396 PREV VISIT EST AGE 40-64: CPT | Performed by: OBSTETRICS & GYNECOLOGY

## 2022-08-29 RX ORDER — ZOLPIDEM TARTRATE 5 MG/1
5 TABLET ORAL NIGHTLY PRN
Qty: 30 TABLET | Refills: 1 | Status: SHIPPED | OUTPATIENT
Start: 2022-08-29 | End: 2022-10-28

## 2022-08-29 ASSESSMENT — PATIENT HEALTH QUESTIONNAIRE - PHQ9
SUM OF ALL RESPONSES TO PHQ QUESTIONS 1-9: 0
2. FEELING DOWN, DEPRESSED OR HOPELESS: 0
SUM OF ALL RESPONSES TO PHQ QUESTIONS 1-9: 0
SUM OF ALL RESPONSES TO PHQ9 QUESTIONS 1 & 2: 0
SUM OF ALL RESPONSES TO PHQ QUESTIONS 1-9: 0
1. LITTLE INTEREST OR PLEASURE IN DOING THINGS: 0
SUM OF ALL RESPONSES TO PHQ QUESTIONS 1-9: 0

## 2022-08-29 NOTE — PROGRESS NOTES
Subjective:      Patient ID: Romina Murillo is a 60 y.o. female    Annual exam.  Reviewed medical, surgical, social and family history. Also reviewed current medications and allergies. No PMB. No GYN complaints. Pap deferred ( negative co-testing 2021; next pap 2024 at age 71 )  per AGOG and ASCCP Guidelines ). Screening mammogram ordered. Monthly SBE encouraged. Screening colonoscopy recommended per routine. F/U annual exam or prn. Vitals:  /68   Ht 4' 10\" (1.473 m)   Wt 165 lb (74.8 kg)   BMI 34.49 kg/m²   Past Medical History:   Diagnosis Date    Hypertension      Past Surgical History:   Procedure Laterality Date    KNEE ARTHROSCOPY Left 08/06/2018    TOTAL KNEE ARTHROPLASTY Left 03/08/2021     Allergies:  Patient has no known allergies. Current Outpatient Medications   Medication Sig Dispense Refill    zolpidem (AMBIEN) 5 MG tablet Take 1 tablet by mouth nightly as needed for Sleep for up to 60 days. 30 tablet 1    atorvastatin (LIPITOR) 20 MG tablet TAKE ONE TABLET BY MOUTH EVERY DAY  5    lisinopril-hydrochlorothiazide (PRINZIDE;ZESTORETIC) 20-12.5 MG per tablet Take 1 tablet by mouth       No current facility-administered medications for this visit.      Social History     Socioeconomic History    Marital status:      Spouse name: Not on file    Number of children: Not on file    Years of education: Not on file    Highest education level: Not on file   Occupational History    Not on file   Tobacco Use    Smoking status: Never    Smokeless tobacco: Never   Substance and Sexual Activity    Alcohol use: No    Drug use: No    Sexual activity: Yes   Other Topics Concern    Not on file   Social History Narrative    Not on file     Social Determinants of Health     Financial Resource Strain: Not on file   Food Insecurity: Not on file   Transportation Needs: Not on file   Physical Activity: Not on file   Stress: Not on file   Social Connections: Not on file   Intimate Partner Violence:

## 2022-10-14 ENCOUNTER — HOSPITAL ENCOUNTER (OUTPATIENT)
Dept: WOMENS IMAGING | Age: 62
Discharge: HOME | End: 2022-10-16
Payer: COMMERCIAL

## 2022-10-14 DIAGNOSIS — Z12.31 SCREENING MAMMOGRAM FOR BREAST CANCER: ICD-10-CM

## 2022-10-14 PROCEDURE — 77063 BREAST TOMOSYNTHESIS BI: CPT

## 2022-12-28 DIAGNOSIS — F51.01 PRIMARY INSOMNIA: Primary | ICD-10-CM

## 2023-01-03 RX ORDER — ZOLPIDEM TARTRATE 5 MG/1
TABLET ORAL
Qty: 30 TABLET | Refills: 0 | Status: SHIPPED | OUTPATIENT
Start: 2023-01-03 | End: 2023-02-02

## 2023-01-27 PROBLEM — E66.811 CLASS 1 OBESITY DUE TO EXCESS CALORIES WITH SERIOUS COMORBIDITY AND BODY MASS INDEX (BMI) OF 32.0 TO 32.9 IN ADULT: Status: ACTIVE | Noted: 2023-01-27

## 2023-01-27 PROBLEM — M54.50 LUMBAR PAIN WITH RADIATION DOWN RIGHT LEG: Status: ACTIVE | Noted: 2023-01-27

## 2023-01-27 PROBLEM — E78.00 HYPERCHOLESTEROLEMIA: Status: ACTIVE | Noted: 2023-01-27

## 2023-01-27 PROBLEM — J30.2 SEASONAL ALLERGIES: Status: ACTIVE | Noted: 2023-01-27

## 2023-01-27 PROBLEM — M17.11 PRIMARY OSTEOARTHRITIS OF RIGHT KNEE: Status: ACTIVE | Noted: 2023-01-27

## 2023-01-27 PROBLEM — I10 BENIGN ESSENTIAL HYPERTENSION: Status: ACTIVE | Noted: 2023-01-27

## 2023-01-27 PROBLEM — F41.9 ANXIETY DISORDER: Status: ACTIVE | Noted: 2023-01-27

## 2023-01-27 PROBLEM — M79.604 LUMBAR PAIN WITH RADIATION DOWN RIGHT LEG: Status: ACTIVE | Noted: 2023-01-27

## 2023-01-27 PROBLEM — E66.09 CLASS 1 OBESITY DUE TO EXCESS CALORIES WITH SERIOUS COMORBIDITY AND BODY MASS INDEX (BMI) OF 32.0 TO 32.9 IN ADULT: Status: ACTIVE | Noted: 2023-01-27

## 2023-01-27 PROBLEM — E55.9 VITAMIN D DEFICIENCY: Status: ACTIVE | Noted: 2023-01-27

## 2023-01-27 RX ORDER — VENLAFAXINE HYDROCHLORIDE 37.5 MG/1
1 CAPSULE, EXTENDED RELEASE ORAL DAILY
COMMUNITY
End: 2023-06-27

## 2023-01-27 RX ORDER — CETIRIZINE HYDROCHLORIDE 10 MG/1
1 TABLET ORAL NIGHTLY
COMMUNITY
Start: 2021-05-25

## 2023-01-27 RX ORDER — LISINOPRIL AND HYDROCHLOROTHIAZIDE 12.5; 2 MG/1; MG/1
1 TABLET ORAL DAILY
COMMUNITY
Start: 2019-08-27 | End: 2024-02-05

## 2023-01-27 RX ORDER — ATORVASTATIN CALCIUM 20 MG/1
1 TABLET, FILM COATED ORAL DAILY
COMMUNITY
Start: 2019-08-04 | End: 2024-01-13

## 2023-06-19 ENCOUNTER — OFFICE VISIT (OUTPATIENT)
Dept: PRIMARY CARE | Facility: CLINIC | Age: 63
End: 2023-06-19
Payer: COMMERCIAL

## 2023-06-19 VITALS
WEIGHT: 163 LBS | TEMPERATURE: 97.3 F | DIASTOLIC BLOOD PRESSURE: 68 MMHG | SYSTOLIC BLOOD PRESSURE: 110 MMHG | HEIGHT: 58 IN | BODY MASS INDEX: 34.22 KG/M2 | HEART RATE: 78 BPM

## 2023-06-19 DIAGNOSIS — I10 BENIGN ESSENTIAL HYPERTENSION: Primary | ICD-10-CM

## 2023-06-19 DIAGNOSIS — F51.01 PRIMARY INSOMNIA: ICD-10-CM

## 2023-06-19 DIAGNOSIS — E66.09 CLASS 1 OBESITY DUE TO EXCESS CALORIES WITH SERIOUS COMORBIDITY AND BODY MASS INDEX (BMI) OF 33.0 TO 33.9 IN ADULT: ICD-10-CM

## 2023-06-19 DIAGNOSIS — Z12.11 COLON CANCER SCREENING: ICD-10-CM

## 2023-06-19 DIAGNOSIS — F41.9 ANXIETY DISORDER, UNSPECIFIED TYPE: ICD-10-CM

## 2023-06-19 DIAGNOSIS — E78.00 HYPERCHOLESTEROLEMIA: ICD-10-CM

## 2023-06-19 PROCEDURE — 3078F DIAST BP <80 MM HG: CPT | Performed by: INTERNAL MEDICINE

## 2023-06-19 PROCEDURE — 3074F SYST BP LT 130 MM HG: CPT | Performed by: INTERNAL MEDICINE

## 2023-06-19 PROCEDURE — 99213 OFFICE O/P EST LOW 20 MIN: CPT | Performed by: INTERNAL MEDICINE

## 2023-06-19 PROCEDURE — 1036F TOBACCO NON-USER: CPT | Performed by: INTERNAL MEDICINE

## 2023-06-19 PROCEDURE — 3008F BODY MASS INDEX DOCD: CPT | Performed by: INTERNAL MEDICINE

## 2023-06-19 RX ORDER — TRAZODONE HYDROCHLORIDE 50 MG/1
50 TABLET ORAL NIGHTLY PRN
Qty: 30 TABLET | Refills: 0 | Status: SHIPPED | OUTPATIENT
Start: 2023-06-19 | End: 2023-08-15

## 2023-06-19 ASSESSMENT — ENCOUNTER SYMPTOMS
HYPERTENSION: 1
SHORTNESS OF BREATH: 0
RESTLESSNESS: 0
GASTROINTESTINAL NEGATIVE: 1
DEPRESSED MOOD: 0
MUSCULOSKELETAL NEGATIVE: 1
COMPULSIONS: 0
BLURRED VISION: 0
EYES NEGATIVE: 1
CARDIOVASCULAR NEGATIVE: 1
NEUROLOGICAL NEGATIVE: 1
SLEEP DISTURBANCE: 1
CONSTITUTIONAL NEGATIVE: 1
DIZZINESS: 0
DECREASED CONCENTRATION: 0
RESPIRATORY NEGATIVE: 1
FEELING OF CHOKING: 0
HYPERVENTILATION: 0
CONFUSION: 0

## 2023-06-19 NOTE — PROGRESS NOTES
"Subjective   Patient ID: Maryan Carey is a 63 y.o. female who presents for Follow-up (4 mo fu and would like sleeping med).    Hypertension  This is a chronic problem. The current episode started more than 1 year ago. The problem has been resolved since onset. The problem is controlled. Associated symptoms include anxiety. Pertinent negatives include no blurred vision, chest pain, peripheral edema or shortness of breath. Risk factors for coronary artery disease include obesity and post-menopausal state. Past treatments include ACE inhibitors and diuretics. The current treatment provides moderate improvement. There are no compliance problems.  There is no history of angina or kidney disease. There is no history of chronic renal disease.   Anxiety  Presents for follow-up visit. Patient reports no chest pain, compulsions, confusion, decreased concentration, depressed mood, dizziness, feeling of choking, hyperventilation, restlessness or shortness of breath. The quality of sleep is non-restorative.          Review of Systems   Constitutional: Negative.    HENT: Negative.     Eyes: Negative.  Negative for blurred vision.   Respiratory: Negative.  Negative for shortness of breath.    Cardiovascular: Negative.  Negative for chest pain.   Gastrointestinal: Negative.    Musculoskeletal: Negative.    Skin: Negative.    Neurological: Negative.  Negative for dizziness.   Psychiatric/Behavioral:  Positive for sleep disturbance. Negative for confusion and decreased concentration.        Objective   Temp 36.3 °C (97.3 °F) (Temporal)   Ht 1.48 m (4' 10.25\")   Wt 73.9 kg (163 lb)   BMI 33.78 kg/m²     Physical Exam  Vitals reviewed.   Constitutional:       Appearance: Normal appearance. She is obese.   HENT:      Head: Normocephalic.   Eyes:      Conjunctiva/sclera: Conjunctivae normal.   Cardiovascular:      Rate and Rhythm: Normal rate and regular rhythm.      Pulses: Normal pulses.   Pulmonary:      Effort: Pulmonary effort " is normal.      Breath sounds: Normal breath sounds.   Abdominal:      Palpations: Abdomen is soft.   Musculoskeletal:         General: Normal range of motion.      Cervical back: Normal range of motion.   Skin:     General: Skin is warm and dry.   Neurological:      General: No focal deficit present.   Psychiatric:         Mood and Affect: Mood normal.       Assessment/Plan   Problem List Items Addressed This Visit       Anxiety disorder    Benign essential hypertension - Primary    Hypercholesterolemia    Class 1 obesity due to excess calories with serious comorbidity and body mass index (BMI) of 33.0 to 33.9 in adult    Primary insomnia   I spent <15 minutes face to face with individual providing recommendations for nutrition choices and exercise plan to help achieve weight reduction goals. Obesity is systemic disorder and it can bring devastating morbidities in furture. It is a matter of calorie gain and loss, keeping bodybank in negative calorie balance mode is the way to sustain weight loss.Diet has a big role in reducing excess body wt. Scheduled and well planned meals and food intake with watchfulness and understanding of calorie portion and distribution is key to understand. Bariatric surgery is another option if sustained wt loss is not achieved and faced with one or more comorbidities with morbid obesity. Weigh yourself twice a week to understand and follow wt loss goals.   She has on restorative sleep, not necessarily she has any sleep disorder, trazodone can be tried, she needs set of laboratories, she needs colonoscopy, she was supposed to see surgical service but that did not happen so I will have her see another surgical service for colonoscopy.  Trazodone can be tried and maintained.  BP readings are stable, she remains on statin therapy, knee replacement went well, current therapeutics were reviewed, venlafaxine dose will not be altered, mammography was reviewed, list of medications will not be  disturbed, weight loss measures needs to be intensified, BMI is 33, follow-up in 4 months.

## 2023-06-25 DIAGNOSIS — F41.9 ANXIETY DISORDER, UNSPECIFIED TYPE: Primary | ICD-10-CM

## 2023-06-27 RX ORDER — VENLAFAXINE HYDROCHLORIDE 37.5 MG/1
CAPSULE, EXTENDED RELEASE ORAL
Qty: 90 CAPSULE | Refills: 0 | Status: SHIPPED | OUTPATIENT
Start: 2023-06-27 | End: 2024-02-09

## 2023-08-15 DIAGNOSIS — F41.9 ANXIETY DISORDER, UNSPECIFIED TYPE: ICD-10-CM

## 2023-08-15 RX ORDER — TRAZODONE HYDROCHLORIDE 50 MG/1
50 TABLET ORAL NIGHTLY PRN
Qty: 30 TABLET | Refills: 6 | Status: SHIPPED | OUTPATIENT
Start: 2023-08-15

## 2023-10-16 ENCOUNTER — OFFICE VISIT (OUTPATIENT)
Dept: PRIMARY CARE | Facility: CLINIC | Age: 63
End: 2023-10-16
Payer: MEDICARE

## 2023-10-16 VITALS
BODY MASS INDEX: 31.45 KG/M2 | HEIGHT: 59 IN | DIASTOLIC BLOOD PRESSURE: 68 MMHG | HEART RATE: 67 BPM | TEMPERATURE: 96.2 F | WEIGHT: 156 LBS | SYSTOLIC BLOOD PRESSURE: 118 MMHG

## 2023-10-16 DIAGNOSIS — F41.9 ANXIETY DISORDER, UNSPECIFIED TYPE: ICD-10-CM

## 2023-10-16 DIAGNOSIS — E66.09 CLASS 1 OBESITY DUE TO EXCESS CALORIES WITH SERIOUS COMORBIDITY AND BODY MASS INDEX (BMI) OF 31.0 TO 31.9 IN ADULT: ICD-10-CM

## 2023-10-16 DIAGNOSIS — I10 BENIGN ESSENTIAL HYPERTENSION: Primary | ICD-10-CM

## 2023-10-16 DIAGNOSIS — E78.00 HYPERCHOLESTEROLEMIA: ICD-10-CM

## 2023-10-16 DIAGNOSIS — Z12.11 COLON CANCER SCREENING: ICD-10-CM

## 2023-10-16 PROCEDURE — 3074F SYST BP LT 130 MM HG: CPT | Performed by: INTERNAL MEDICINE

## 2023-10-16 PROCEDURE — 3078F DIAST BP <80 MM HG: CPT | Performed by: INTERNAL MEDICINE

## 2023-10-16 PROCEDURE — 3008F BODY MASS INDEX DOCD: CPT | Performed by: INTERNAL MEDICINE

## 2023-10-16 PROCEDURE — 99213 OFFICE O/P EST LOW 20 MIN: CPT | Performed by: INTERNAL MEDICINE

## 2023-10-16 PROCEDURE — 1036F TOBACCO NON-USER: CPT | Performed by: INTERNAL MEDICINE

## 2023-10-16 ASSESSMENT — ENCOUNTER SYMPTOMS
RESPIRATORY NEGATIVE: 1
CONSTITUTIONAL NEGATIVE: 1
NEUROLOGICAL NEGATIVE: 1
GASTROINTESTINAL NEGATIVE: 1
CARDIOVASCULAR NEGATIVE: 1
EYES NEGATIVE: 1
MUSCULOSKELETAL NEGATIVE: 1

## 2023-10-16 NOTE — PROGRESS NOTES
"Subjective   Patient ID: Maryan Carey is a 63 y.o. female who presents for Follow-up (4 mo fu).    Hyperlipidemia  This is a chronic problem. The current episode started more than 1 year ago. The problem is controlled. Recent lipid tests were reviewed and are normal. Exacerbating diseases include obesity. She has no history of chronic renal disease or diabetes. Pertinent negatives include no chest pain. Current antihyperlipidemic treatment includes statins. The current treatment provides significant improvement of lipids. Risk factors for coronary artery disease include dyslipidemia, obesity and post-menopausal.      Hypertension  This is a chronic problem. The current episode started more than 1 year ago. The problem has been resolved since onset. The problem is controlled. Associated symptoms include anxiety. Pertinent negatives include no blurred vision, chest pain, peripheral edema or shortness of breath. Risk factors for coronary artery disease include obesity and post-menopausal state. Past treatments include ACE inhibitors and diuretics. The current treatment provides moderate improvement. There are no compliance problems.  There is no history of angina or kidney disease. There is no history of chronic renal disease.   Anxiety  Presents for follow-up visit. Patient reports no chest pain, compulsions, confusion, decreased concentration, depressed mood, dizziness, feeling of choking, hyperventilation, restlessness or shortness of breath. The quality of sleep is restorative.     Review of Systems   Constitutional: Negative.    HENT: Negative.     Eyes: Negative.    Respiratory: Negative.     Cardiovascular: Negative.  Negative for chest pain.   Gastrointestinal: Negative.    Musculoskeletal: Negative.    Neurological: Negative.        Objective   /68 (BP Location: Right arm, Patient Position: Sitting, BP Cuff Size: Adult)   Pulse 67   Temp 35.7 °C (96.2 °F) (Temporal)   Ht 1.492 m (4' 10.75\")   Wt " 70.8 kg (156 lb)   BMI 31.78 kg/m²     Physical Exam  Vitals reviewed.   Constitutional:       Appearance: Normal appearance. She is obese.   HENT:      Head: Normocephalic.   Eyes:      Conjunctiva/sclera: Conjunctivae normal.   Cardiovascular:      Rate and Rhythm: Normal rate and regular rhythm.      Pulses: Normal pulses.   Pulmonary:      Effort: Pulmonary effort is normal.      Breath sounds: Normal breath sounds.   Abdominal:      Palpations: Abdomen is soft.   Musculoskeletal:         General: Normal range of motion.      Cervical back: Normal range of motion.   Skin:     General: Skin is warm and dry.   Neurological:      General: No focal deficit present.   Psychiatric:         Mood and Affect: Mood normal.       Assessment/Plan   Problem List Items Addressed This Visit             ICD-10-CM    Anxiety disorder F41.9    Benign essential hypertension - Primary I10    Hypercholesterolemia E78.00    Class 1 obesity due to excess calories with serious comorbidity and body mass index (BMI) of 31.0 to 31.9 in adult E66.09, Z68.31   Patient was evaluated today, problem list was reviewed, problems and concerns addressed, Rx list reviewed and updated,  Life style changes were discussed, always it works better if we eat plant based diet and plenty of fibres and roughage. Consume adequate amount of water and avoid alcohol, light to moderate physical activities and stress reduction are always beneficial for ongoing physical well being. Do not forget to have 6 to 7 hours of sleep.  She is always worried about Alzheimer's disease because her mother had problem and we always talk about mind exercises and lifestyle modifications to prevent cognitive impairment in the future and 1 thing that I told her today is a diet has a major role, plant-based diet, Mediterranean diet, cessation of any sort of carbonated beverages, Pepsi, Coke are always beneficial for prevention of cognitive decline.  Otherwise she has been doing  well, her BP readings are stable, she remains on statins, she remains on venlafaxine, she does not use trazodone routinely.  Screening test are reviewed.  Colonoscopy has been due and now she will proceed with that, she will see her gynecologist and proceed with her mammography she says, she can still lose 12 to 15 pounds in order to bring BMI less than 27.  No fall, no aches and pains to be out of ordinary, follow-up in 4 months.

## 2023-10-19 ENCOUNTER — OFFICE VISIT (OUTPATIENT)
Dept: OBGYN CLINIC | Age: 63
End: 2023-10-19
Payer: COMMERCIAL

## 2023-10-19 VITALS
WEIGHT: 159 LBS | HEIGHT: 58 IN | DIASTOLIC BLOOD PRESSURE: 70 MMHG | SYSTOLIC BLOOD PRESSURE: 110 MMHG | BODY MASS INDEX: 33.37 KG/M2

## 2023-10-19 DIAGNOSIS — Z78.0 POSTMENOPAUSAL: ICD-10-CM

## 2023-10-19 DIAGNOSIS — G47.09 OTHER INSOMNIA: ICD-10-CM

## 2023-10-19 DIAGNOSIS — Z12.31 SCREENING MAMMOGRAM FOR BREAST CANCER: ICD-10-CM

## 2023-10-19 DIAGNOSIS — Z13.820 OSTEOPOROSIS SCREENING: ICD-10-CM

## 2023-10-19 DIAGNOSIS — Z01.419 ENCOUNTER FOR WELL WOMAN EXAM WITH ROUTINE GYNECOLOGICAL EXAM: Primary | ICD-10-CM

## 2023-10-19 PROCEDURE — 99396 PREV VISIT EST AGE 40-64: CPT | Performed by: OBSTETRICS & GYNECOLOGY

## 2023-10-19 RX ORDER — ZOLPIDEM TARTRATE 5 MG/1
5 TABLET ORAL NIGHTLY PRN
Qty: 30 TABLET | Refills: 1 | Status: SHIPPED | OUTPATIENT
Start: 2023-10-19 | End: 2023-11-02

## 2023-10-19 SDOH — ECONOMIC STABILITY: HOUSING INSECURITY
IN THE LAST 12 MONTHS, WAS THERE A TIME WHEN YOU DID NOT HAVE A STEADY PLACE TO SLEEP OR SLEPT IN A SHELTER (INCLUDING NOW)?: NO

## 2023-10-19 SDOH — ECONOMIC STABILITY: INCOME INSECURITY: HOW HARD IS IT FOR YOU TO PAY FOR THE VERY BASICS LIKE FOOD, HOUSING, MEDICAL CARE, AND HEATING?: NOT HARD AT ALL

## 2023-10-19 SDOH — ECONOMIC STABILITY: FOOD INSECURITY: WITHIN THE PAST 12 MONTHS, THE FOOD YOU BOUGHT JUST DIDN'T LAST AND YOU DIDN'T HAVE MONEY TO GET MORE.: NEVER TRUE

## 2023-10-19 SDOH — ECONOMIC STABILITY: FOOD INSECURITY: WITHIN THE PAST 12 MONTHS, YOU WORRIED THAT YOUR FOOD WOULD RUN OUT BEFORE YOU GOT MONEY TO BUY MORE.: NEVER TRUE

## 2023-10-19 ASSESSMENT — ENCOUNTER SYMPTOMS
VOMITING: 0
RESPIRATORY NEGATIVE: 1
NAUSEA: 0
CONSTIPATION: 0
DIARRHEA: 0
BLOOD IN STOOL: 0
ABDOMINAL PAIN: 0
ABDOMINAL DISTENTION: 0
ALLERGIC/IMMUNOLOGIC NEGATIVE: 1
ANAL BLEEDING: 0
RECTAL PAIN: 0
EYES NEGATIVE: 1

## 2023-10-19 ASSESSMENT — PATIENT HEALTH QUESTIONNAIRE - PHQ9
SUM OF ALL RESPONSES TO PHQ QUESTIONS 1-9: 0
SUM OF ALL RESPONSES TO PHQ QUESTIONS 1-9: 0
1. LITTLE INTEREST OR PLEASURE IN DOING THINGS: 0
SUM OF ALL RESPONSES TO PHQ9 QUESTIONS 1 & 2: 0
SUM OF ALL RESPONSES TO PHQ QUESTIONS 1-9: 0
2. FEELING DOWN, DEPRESSED OR HOPELESS: 0
SUM OF ALL RESPONSES TO PHQ QUESTIONS 1-9: 0

## 2023-10-19 ASSESSMENT — VISUAL ACUITY: OU: 1

## 2023-10-19 NOTE — PROGRESS NOTES
The patient was asked if she would like a chaperone present for her intimate exam. She  Declined the chaperone.  Maikol Crowe CMA ([facility])

## 2023-10-26 ENCOUNTER — TELEPHONE (OUTPATIENT)
Dept: OBGYN CLINIC | Age: 63
End: 2023-10-26

## 2023-11-01 ENCOUNTER — TELEPHONE (OUTPATIENT)
Dept: OBGYN CLINIC | Age: 63
End: 2023-11-01

## 2023-11-17 ENCOUNTER — HOSPITAL ENCOUNTER (OUTPATIENT)
Dept: WOMENS IMAGING | Age: 63
Discharge: HOME | End: 2023-11-17
Payer: COMMERCIAL

## 2023-11-17 ENCOUNTER — HOSPITAL ENCOUNTER (OUTPATIENT)
Dept: WOMENS IMAGING | Age: 63
End: 2023-11-17
Payer: COMMERCIAL

## 2023-11-17 VITALS — BODY MASS INDEX: 33.24 KG/M2 | HEIGHT: 58 IN

## 2023-11-17 DIAGNOSIS — Z12.31 SCREENING MAMMOGRAM FOR BREAST CANCER: ICD-10-CM

## 2023-11-17 DIAGNOSIS — Z13.820 OSTEOPOROSIS SCREENING: ICD-10-CM

## 2023-11-17 DIAGNOSIS — Z78.0 POSTMENOPAUSAL: ICD-10-CM

## 2023-11-17 PROCEDURE — 77063 BREAST TOMOSYNTHESIS BI: CPT

## 2023-11-17 PROCEDURE — 77080 DXA BONE DENSITY AXIAL: CPT

## 2023-11-22 DIAGNOSIS — F41.9 ANXIETY DISORDER, UNSPECIFIED TYPE: ICD-10-CM

## 2023-11-22 RX ORDER — ESCITALOPRAM OXALATE 20 MG/1
1 TABLET ORAL DAILY
COMMUNITY
End: 2023-11-22 | Stop reason: SDUPTHER

## 2023-11-22 RX ORDER — ESCITALOPRAM OXALATE 20 MG/1
20 TABLET ORAL DAILY
Qty: 30 TABLET | Refills: 11 | Status: SHIPPED | OUTPATIENT
Start: 2023-11-22 | End: 2024-02-21 | Stop reason: WASHOUT

## 2023-11-22 NOTE — TELEPHONE ENCOUNTER
Stated Effexor is not working well, went back on Lexapro and it is working and she would like refills.

## 2023-12-15 ENCOUNTER — APPOINTMENT (OUTPATIENT)
Dept: ORTHOPEDIC SURGERY | Facility: CLINIC | Age: 63
End: 2023-12-15
Payer: MEDICARE

## 2024-01-13 DIAGNOSIS — E78.00 HYPERCHOLESTEROLEMIA: Primary | ICD-10-CM

## 2024-01-13 RX ORDER — ATORVASTATIN CALCIUM 20 MG/1
20 TABLET, FILM COATED ORAL DAILY
Qty: 90 TABLET | Refills: 0 | Status: SHIPPED | OUTPATIENT
Start: 2024-01-13 | End: 2024-04-22

## 2024-02-05 DIAGNOSIS — I10 BENIGN ESSENTIAL HYPERTENSION: ICD-10-CM

## 2024-02-05 RX ORDER — LISINOPRIL AND HYDROCHLOROTHIAZIDE 12.5; 2 MG/1; MG/1
1 TABLET ORAL DAILY
Qty: 30 TABLET | Refills: 3 | Status: SHIPPED | OUTPATIENT
Start: 2024-02-05 | End: 2024-04-16 | Stop reason: SDUPTHER

## 2024-02-08 DIAGNOSIS — F41.9 ANXIETY DISORDER, UNSPECIFIED TYPE: ICD-10-CM

## 2024-02-09 RX ORDER — VENLAFAXINE HYDROCHLORIDE 37.5 MG/1
CAPSULE, EXTENDED RELEASE ORAL
Qty: 90 CAPSULE | Refills: 0 | Status: SHIPPED | OUTPATIENT
Start: 2024-02-09

## 2024-02-16 ENCOUNTER — APPOINTMENT (OUTPATIENT)
Dept: PRIMARY CARE | Facility: CLINIC | Age: 64
End: 2024-02-16
Payer: MEDICARE

## 2024-02-17 ENCOUNTER — APPOINTMENT (OUTPATIENT)
Dept: CT IMAGING | Age: 64
End: 2024-02-17
Payer: COMMERCIAL

## 2024-02-17 ENCOUNTER — APPOINTMENT (OUTPATIENT)
Dept: GENERAL RADIOLOGY | Age: 64
End: 2024-02-17
Payer: COMMERCIAL

## 2024-02-17 ENCOUNTER — HOSPITAL ENCOUNTER (EMERGENCY)
Age: 64
Discharge: HOME | End: 2024-02-18
Attending: EMERGENCY MEDICINE
Payer: COMMERCIAL

## 2024-02-17 VITALS
SYSTOLIC BLOOD PRESSURE: 107 MMHG | OXYGEN SATURATION: 99 % | HEART RATE: 92 BPM | TEMPERATURE: 98 F | DIASTOLIC BLOOD PRESSURE: 81 MMHG | BODY MASS INDEX: 35.33 KG/M2 | WEIGHT: 169 LBS | RESPIRATION RATE: 19 BRPM

## 2024-02-17 DIAGNOSIS — D73.89 LESION OF SPLEEN: ICD-10-CM

## 2024-02-17 DIAGNOSIS — J10.1 INFLUENZA A: Primary | ICD-10-CM

## 2024-02-17 DIAGNOSIS — J18.9 PNEUMONIA DUE TO INFECTIOUS ORGANISM, UNSPECIFIED LATERALITY, UNSPECIFIED PART OF LUNG: ICD-10-CM

## 2024-02-17 LAB
ALBUMIN SERPL-MCNC: 3.8 G/DL (ref 3.5–4.6)
ALP SERPL-CCNC: 176 U/L (ref 40–130)
ALT SERPL-CCNC: 24 U/L (ref 0–33)
ANION GAP SERPL CALCULATED.3IONS-SCNC: 12 MEQ/L (ref 9–15)
APTT PPP: 39.1 SEC (ref 24.4–36.8)
AST SERPL-CCNC: 34 U/L (ref 0–35)
BASOPHILS # BLD: 0 K/UL (ref 0–0.2)
BASOPHILS NFR BLD: 0.1 %
BILIRUB SERPL-MCNC: <0.2 MG/DL (ref 0.2–0.7)
BNP BLD-MCNC: 61 PG/ML
BUN SERPL-MCNC: 11 MG/DL (ref 8–23)
CALCIUM SERPL-MCNC: 8.5 MG/DL (ref 8.5–9.9)
CHLORIDE SERPL-SCNC: 100 MEQ/L (ref 95–107)
CO2 SERPL-SCNC: 28 MEQ/L (ref 20–31)
CREAT SERPL-MCNC: 0.88 MG/DL (ref 0.5–0.9)
D DIMER PPP FEU-MCNC: 1.28 MG/L FEU (ref 0–0.5)
EOSINOPHIL # BLD: 0.1 K/UL (ref 0–0.7)
EOSINOPHIL NFR BLD: 1 %
ERYTHROCYTE [DISTWIDTH] IN BLOOD BY AUTOMATED COUNT: 13.9 % (ref 11.5–14.5)
GLOBULIN SER CALC-MCNC: 3.2 G/DL (ref 2.3–3.5)
GLUCOSE SERPL-MCNC: 123 MG/DL (ref 70–99)
HCT VFR BLD AUTO: 33.1 % (ref 37–47)
HGB BLD-MCNC: 10.2 G/DL (ref 12–16)
INFLUENZA A BY PCR: POSITIVE
INFLUENZA B BY PCR: NEGATIVE
INR PPP: 1.2
LIPASE SERPL-CCNC: 21 U/L (ref 12–95)
LYMPHOCYTES # BLD: 0.9 K/UL (ref 1–4.8)
LYMPHOCYTES NFR BLD: 11.4 %
MAGNESIUM SERPL-MCNC: 1.9 MG/DL (ref 1.7–2.4)
MCH RBC QN AUTO: 26.3 PG (ref 27–31.3)
MCHC RBC AUTO-ENTMCNC: 30.8 % (ref 33–37)
MCV RBC AUTO: 85.3 FL (ref 79.4–94.8)
MONOCYTES # BLD: 0.5 K/UL (ref 0.2–0.8)
MONOCYTES NFR BLD: 6.5 %
NEUTROPHILS # BLD: 6.4 K/UL (ref 1.4–6.5)
NEUTS SEG NFR BLD: 80.7 %
PLATELET # BLD AUTO: 261 K/UL (ref 130–400)
POTASSIUM SERPL-SCNC: 3.6 MEQ/L (ref 3.4–4.9)
PROT SERPL-MCNC: 7 G/DL (ref 6.3–8)
PROTHROMBIN TIME: 15.1 SEC (ref 12.3–14.9)
RBC # BLD AUTO: 3.88 M/UL (ref 4.2–5.4)
SARS-COV-2 RDRP RESP QL NAA+PROBE: NOT DETECTED
SODIUM SERPL-SCNC: 140 MEQ/L (ref 135–144)
TROPONIN, HIGH SENSITIVITY: 8 NG/L (ref 0–19)
TROPONIN, HIGH SENSITIVITY: 9 NG/L (ref 0–19)
WBC # BLD AUTO: 7.9 K/UL (ref 4.8–10.8)

## 2024-02-17 PROCEDURE — 85025 COMPLETE CBC W/AUTO DIFF WBC: CPT

## 2024-02-17 PROCEDURE — 84484 ASSAY OF TROPONIN QUANT: CPT

## 2024-02-17 PROCEDURE — 93005 ELECTROCARDIOGRAM TRACING: CPT

## 2024-02-17 PROCEDURE — 80053 COMPREHEN METABOLIC PANEL: CPT

## 2024-02-17 PROCEDURE — 2580000003 HC RX 258: Performed by: EMERGENCY MEDICINE

## 2024-02-17 PROCEDURE — 36415 COLL VENOUS BLD VENIPUNCTURE: CPT

## 2024-02-17 PROCEDURE — 83690 ASSAY OF LIPASE: CPT

## 2024-02-17 PROCEDURE — 96367 TX/PROPH/DG ADDL SEQ IV INF: CPT

## 2024-02-17 PROCEDURE — 96365 THER/PROPH/DIAG IV INF INIT: CPT

## 2024-02-17 PROCEDURE — 85379 FIBRIN DEGRADATION QUANT: CPT

## 2024-02-17 PROCEDURE — 87635 SARS-COV-2 COVID-19 AMP PRB: CPT

## 2024-02-17 PROCEDURE — 85730 THROMBOPLASTIN TIME PARTIAL: CPT

## 2024-02-17 PROCEDURE — 6360000002 HC RX W HCPCS: Performed by: EMERGENCY MEDICINE

## 2024-02-17 PROCEDURE — 96375 TX/PRO/DX INJ NEW DRUG ADDON: CPT

## 2024-02-17 PROCEDURE — 99285 EMERGENCY DEPT VISIT HI MDM: CPT

## 2024-02-17 PROCEDURE — 87502 INFLUENZA DNA AMP PROBE: CPT

## 2024-02-17 PROCEDURE — 71275 CT ANGIOGRAPHY CHEST: CPT

## 2024-02-17 PROCEDURE — 83735 ASSAY OF MAGNESIUM: CPT

## 2024-02-17 PROCEDURE — 96366 THER/PROPH/DIAG IV INF ADDON: CPT

## 2024-02-17 PROCEDURE — 71045 X-RAY EXAM CHEST 1 VIEW: CPT

## 2024-02-17 PROCEDURE — 85610 PROTHROMBIN TIME: CPT

## 2024-02-17 PROCEDURE — 6360000004 HC RX CONTRAST MEDICATION: Performed by: EMERGENCY MEDICINE

## 2024-02-17 PROCEDURE — 83880 ASSAY OF NATRIURETIC PEPTIDE: CPT

## 2024-02-17 RX ORDER — METOCLOPRAMIDE HYDROCHLORIDE 5 MG/ML
10 INJECTION INTRAMUSCULAR; INTRAVENOUS ONCE
Status: COMPLETED | OUTPATIENT
Start: 2024-02-17 | End: 2024-02-17

## 2024-02-17 RX ORDER — ONDANSETRON 4 MG/1
4 TABLET, ORALLY DISINTEGRATING ORAL 3 TIMES DAILY PRN
Qty: 21 TABLET | Refills: 0 | Status: SHIPPED | OUTPATIENT
Start: 2024-02-17

## 2024-02-17 RX ORDER — PROCHLORPERAZINE EDISYLATE 5 MG/ML
5 INJECTION INTRAMUSCULAR; INTRAVENOUS ONCE
Status: COMPLETED | OUTPATIENT
Start: 2024-02-17 | End: 2024-02-17

## 2024-02-17 RX ORDER — AZITHROMYCIN 250 MG/1
TABLET, FILM COATED ORAL
Qty: 1 PACKET | Refills: 0 | Status: SHIPPED | OUTPATIENT
Start: 2024-02-17 | End: 2024-02-21

## 2024-02-17 RX ORDER — 0.9 % SODIUM CHLORIDE 0.9 %
1000 INTRAVENOUS SOLUTION INTRAVENOUS ONCE
Status: COMPLETED | OUTPATIENT
Start: 2024-02-17 | End: 2024-02-17

## 2024-02-17 RX ORDER — AMOXICILLIN AND CLAVULANATE POTASSIUM 875; 125 MG/1; MG/1
1 TABLET, FILM COATED ORAL 2 TIMES DAILY
Qty: 20 TABLET | Refills: 0 | Status: SHIPPED | OUTPATIENT
Start: 2024-02-17 | End: 2024-02-27

## 2024-02-17 RX ADMIN — SODIUM CHLORIDE 1000 ML: 9 INJECTION, SOLUTION INTRAVENOUS at 19:52

## 2024-02-17 RX ADMIN — PROCHLORPERAZINE EDISYLATE 5 MG: 5 INJECTION INTRAMUSCULAR; INTRAVENOUS at 23:10

## 2024-02-17 RX ADMIN — CEFTRIAXONE SODIUM 1000 MG: 1 INJECTION, POWDER, FOR SOLUTION INTRAMUSCULAR; INTRAVENOUS at 22:11

## 2024-02-17 RX ADMIN — IOPAMIDOL 75 ML: 755 INJECTION, SOLUTION INTRAVENOUS at 20:53

## 2024-02-17 RX ADMIN — METOCLOPRAMIDE HYDROCHLORIDE 10 MG: 5 INJECTION INTRAMUSCULAR; INTRAVENOUS at 19:53

## 2024-02-17 RX ADMIN — AZITHROMYCIN MONOHYDRATE 500 MG: 500 INJECTION, POWDER, LYOPHILIZED, FOR SOLUTION INTRAVENOUS at 22:51

## 2024-02-17 ASSESSMENT — PAIN - FUNCTIONAL ASSESSMENT
PAIN_FUNCTIONAL_ASSESSMENT: NONE - DENIES PAIN
PAIN_FUNCTIONAL_ASSESSMENT: NONE - DENIES PAIN

## 2024-02-17 ASSESSMENT — HEART SCORE: ECG: 0

## 2024-02-18 NOTE — ED PROVIDER NOTES
NATRIURETIC PEPTIDE   TROPONIN   TROPONIN       All other labs were within normal range or not returned as of this dictation.    EMERGENCY DEPARTMENT COURSE and DIFFERENTIAL DIAGNOSIS/MDM:   Vitals:    Vitals:    02/17/24 1859 02/17/24 2035 02/17/24 2155 02/17/24 2215   BP: (!) 142/75 (!) 140/79  107/81   Pulse: 99 (!) 101 87 92   Resp: 18 18  19   Temp: 98 °F (36.7 °C)      TempSrc: Temporal      SpO2: 98% 94%  99%   Weight: 76.7 kg (169 lb)          Cardiac enzymes not consistent with acute myocardial ischemia  Unable to r/o PE with PERC criteria  Influenza+, out of window for tamiflu   nonischemic EKG, doubt ACS  Patient denies that she has never had a cardiac catheterization  Medical Decision Making  Amount and/or Complexity of Data Reviewed  Labs: ordered.  Radiology: ordered.  ECG/medicine tests: ordered.    Risk  Prescription drug management.    Patient presents to the emergency department for evaluation of atypical chest discomfort.  Given age, comorbidities, cardiopulmonary workup pursued.  Patient given IV Reglan and fluid bolus.  Will reevaluate.  No leukocytosis.  No significant electrolyte derangement.  No transaminitis.  [id number] 1 appropriate for outpatient management  Low HEART score for adverse outcome, no indication for admission  Influenza explains presentation, probable superimposed PNA.  Per radiology no pulmonary embolism or aortic pathology.  Right-sided pneumonitis noted.  Incidental finding noted. Saturating well on RA. GIVEN DOSE iv ROCEPHIN/AZITHRO. Low PSI score, no indication for admission. Appropriate for PCP f/u, return precuations    Her vital signs are within normal limits.  She ambulated on pulse ox without any difficulty or desaturation.    CONSULTS:  None    PROCEDURES:  Unless otherwise noted below, none     Procedures        FINAL IMPRESSION      1. Influenza A    2. Pneumonia due to infectious organism, unspecified laterality, unspecified part of lung    3. Lesion of spleen

## 2024-02-18 NOTE — DISCHARGE INSTRUCTIONS
It is important to return for any significant changes.  Medication as prescribed.  Follow-up with your doctor.  Thank you.

## 2024-02-19 LAB
EKG ATRIAL RATE: 93 BPM
EKG P AXIS: 46 DEGREES
EKG P-R INTERVAL: 170 MS
EKG Q-T INTERVAL: 366 MS
EKG QRS DURATION: 82 MS
EKG QTC CALCULATION (BAZETT): 455 MS
EKG R AXIS: 21 DEGREES
EKG T AXIS: 37 DEGREES
EKG VENTRICULAR RATE: 93 BPM

## 2024-02-21 ENCOUNTER — LAB (OUTPATIENT)
Dept: LAB | Facility: LAB | Age: 64
End: 2024-02-21
Payer: MEDICARE

## 2024-02-21 ENCOUNTER — OFFICE VISIT (OUTPATIENT)
Dept: PRIMARY CARE | Facility: CLINIC | Age: 64
End: 2024-02-21
Payer: MEDICARE

## 2024-02-21 VITALS
WEIGHT: 159 LBS | DIASTOLIC BLOOD PRESSURE: 80 MMHG | HEART RATE: 67 BPM | BODY MASS INDEX: 32.05 KG/M2 | SYSTOLIC BLOOD PRESSURE: 120 MMHG | TEMPERATURE: 96 F | HEIGHT: 59 IN

## 2024-02-21 DIAGNOSIS — E66.09 CLASS 1 OBESITY DUE TO EXCESS CALORIES WITH SERIOUS COMORBIDITY AND BODY MASS INDEX (BMI) OF 32.0 TO 32.9 IN ADULT: ICD-10-CM

## 2024-02-21 DIAGNOSIS — Z12.11 SCREENING FOR COLORECTAL CANCER: ICD-10-CM

## 2024-02-21 DIAGNOSIS — Z00.00 ROUTINE GENERAL MEDICAL EXAMINATION AT HEALTH CARE FACILITY: Primary | ICD-10-CM

## 2024-02-21 DIAGNOSIS — D64.9 ANEMIA, UNSPECIFIED TYPE: ICD-10-CM

## 2024-02-21 DIAGNOSIS — Z12.12 SCREENING FOR COLORECTAL CANCER: ICD-10-CM

## 2024-02-21 DIAGNOSIS — Z13.6 SCREENING FOR CARDIOVASCULAR CONDITION: ICD-10-CM

## 2024-02-21 DIAGNOSIS — E61.1 IRON DEFICIENCY: ICD-10-CM

## 2024-02-21 DIAGNOSIS — E78.00 HYPERCHOLESTEROLEMIA: ICD-10-CM

## 2024-02-21 DIAGNOSIS — I10 BENIGN ESSENTIAL HYPERTENSION: ICD-10-CM

## 2024-02-21 LAB
ALBUMIN SERPL BCP-MCNC: 3.9 G/DL (ref 3.4–5)
ALP SERPL-CCNC: 126 U/L (ref 33–136)
ALT SERPL W P-5'-P-CCNC: 20 U/L (ref 7–45)
ANION GAP SERPL CALC-SCNC: 14 MMOL/L (ref 10–20)
AST SERPL W P-5'-P-CCNC: 33 U/L (ref 9–39)
BASOPHILS # BLD AUTO: 0.01 X10*3/UL (ref 0–0.1)
BASOPHILS NFR BLD AUTO: 0.3 %
BILIRUB SERPL-MCNC: 0.3 MG/DL (ref 0–1.2)
BUN SERPL-MCNC: 11 MG/DL (ref 6–23)
CALCIUM SERPL-MCNC: 9.1 MG/DL (ref 8.6–10.3)
CHLORIDE SERPL-SCNC: 102 MMOL/L (ref 98–107)
CHOLEST SERPL-MCNC: 126 MG/DL (ref 0–199)
CHOLESTEROL/HDL RATIO: 3.7
CO2 SERPL-SCNC: 29 MMOL/L (ref 21–32)
CREAT SERPL-MCNC: 0.89 MG/DL (ref 0.5–1.05)
EGFRCR SERPLBLD CKD-EPI 2021: 72 ML/MIN/1.73M*2
EOSINOPHIL # BLD AUTO: 0.1 X10*3/UL (ref 0–0.7)
EOSINOPHIL NFR BLD AUTO: 3.1 %
ERYTHROCYTE [DISTWIDTH] IN BLOOD BY AUTOMATED COUNT: 14 % (ref 11.5–14.5)
FERRITIN SERPL-MCNC: 529 NG/ML (ref 8–150)
GLUCOSE SERPL-MCNC: 125 MG/DL (ref 74–99)
HCT VFR BLD AUTO: 35.5 % (ref 36–46)
HDLC SERPL-MCNC: 33.9 MG/DL
HGB BLD-MCNC: 10.6 G/DL (ref 12–16)
IMM GRANULOCYTES # BLD AUTO: 0.01 X10*3/UL (ref 0–0.7)
IMM GRANULOCYTES NFR BLD AUTO: 0.3 % (ref 0–0.9)
IRON SATN MFR SERPL: 8 % (ref 25–45)
IRON SERPL-MCNC: 26 UG/DL (ref 35–150)
LDLC SERPL CALC-MCNC: 68 MG/DL
LYMPHOCYTES # BLD AUTO: 1.58 X10*3/UL (ref 1.2–4.8)
LYMPHOCYTES NFR BLD AUTO: 48.8 %
MCH RBC QN AUTO: 26.2 PG (ref 26–34)
MCHC RBC AUTO-ENTMCNC: 29.9 G/DL (ref 32–36)
MCV RBC AUTO: 88 FL (ref 80–100)
MONOCYTES # BLD AUTO: 0.3 X10*3/UL (ref 0.1–1)
MONOCYTES NFR BLD AUTO: 9.3 %
NEUTROPHILS # BLD AUTO: 1.24 X10*3/UL (ref 1.2–7.7)
NEUTROPHILS NFR BLD AUTO: 38.2 %
NON HDL CHOLESTEROL: 92 MG/DL (ref 0–149)
NRBC BLD-RTO: 0 /100 WBCS (ref 0–0)
PLATELET # BLD AUTO: 294 X10*3/UL (ref 150–450)
POTASSIUM SERPL-SCNC: 3.7 MMOL/L (ref 3.5–5.3)
PROT SERPL-MCNC: 6.7 G/DL (ref 6.4–8.2)
PROT SERPL-MCNC: 6.7 G/DL (ref 6.4–8.2)
RBC # BLD AUTO: 4.05 X10*6/UL (ref 4–5.2)
SODIUM SERPL-SCNC: 141 MMOL/L (ref 136–145)
TIBC SERPL-MCNC: 328 UG/DL (ref 240–445)
TRIGL SERPL-MCNC: 121 MG/DL (ref 0–149)
UIBC SERPL-MCNC: 302 UG/DL (ref 110–370)
VLDL: 24 MG/DL (ref 0–40)
WBC # BLD AUTO: 3.2 X10*3/UL (ref 4.4–11.3)

## 2024-02-21 PROCEDURE — 83020 HEMOGLOBIN ELECTROPHORESIS: CPT | Performed by: INTERNAL MEDICINE

## 2024-02-21 PROCEDURE — 1159F MED LIST DOCD IN RCRD: CPT | Performed by: INTERNAL MEDICINE

## 2024-02-21 PROCEDURE — 86320 SERUM IMMUNOELECTROPHORESIS: CPT | Performed by: INTERNAL MEDICINE

## 2024-02-21 PROCEDURE — 80061 LIPID PANEL: CPT

## 2024-02-21 PROCEDURE — 84165 PROTEIN E-PHORESIS SERUM: CPT

## 2024-02-21 PROCEDURE — 1170F FXNL STATUS ASSESSED: CPT | Performed by: INTERNAL MEDICINE

## 2024-02-21 PROCEDURE — 86334 IMMUNOFIX E-PHORESIS SERUM: CPT

## 2024-02-21 PROCEDURE — 83540 ASSAY OF IRON: CPT

## 2024-02-21 PROCEDURE — G0402 INITIAL PREVENTIVE EXAM: HCPCS | Performed by: INTERNAL MEDICINE

## 2024-02-21 PROCEDURE — 3008F BODY MASS INDEX DOCD: CPT | Performed by: INTERNAL MEDICINE

## 2024-02-21 PROCEDURE — 3074F SYST BP LT 130 MM HG: CPT | Performed by: INTERNAL MEDICINE

## 2024-02-21 PROCEDURE — 83550 IRON BINDING TEST: CPT

## 2024-02-21 PROCEDURE — 84165 PROTEIN E-PHORESIS SERUM: CPT | Performed by: INTERNAL MEDICINE

## 2024-02-21 PROCEDURE — 83021 HEMOGLOBIN CHROMOTOGRAPHY: CPT

## 2024-02-21 PROCEDURE — 1160F RVW MEDS BY RX/DR IN RCRD: CPT | Performed by: INTERNAL MEDICINE

## 2024-02-21 PROCEDURE — 80053 COMPREHEN METABOLIC PANEL: CPT

## 2024-02-21 PROCEDURE — 1125F AMNT PAIN NOTED PAIN PRSNT: CPT | Performed by: INTERNAL MEDICINE

## 2024-02-21 PROCEDURE — 1123F ACP DISCUSS/DSCN MKR DOCD: CPT | Performed by: INTERNAL MEDICINE

## 2024-02-21 PROCEDURE — 82728 ASSAY OF FERRITIN: CPT

## 2024-02-21 PROCEDURE — 1036F TOBACCO NON-USER: CPT | Performed by: INTERNAL MEDICINE

## 2024-02-21 PROCEDURE — 85025 COMPLETE CBC W/AUTO DIFF WBC: CPT

## 2024-02-21 PROCEDURE — 3079F DIAST BP 80-89 MM HG: CPT | Performed by: INTERNAL MEDICINE

## 2024-02-21 RX ORDER — FERROUS SULFATE 325(65) MG
TABLET ORAL
Qty: 30 TABLET | Refills: 3 | Status: SHIPPED | OUTPATIENT
Start: 2024-02-21

## 2024-02-21 ASSESSMENT — ENCOUNTER SYMPTOMS
OCCASIONAL FEELINGS OF UNSTEADINESS: 0
DEPRESSION: 0
EYES NEGATIVE: 1
GASTROINTESTINAL NEGATIVE: 1
MUSCULOSKELETAL NEGATIVE: 1
DIZZINESS: 0
PSYCHIATRIC NEGATIVE: 1
CARDIOVASCULAR NEGATIVE: 1
COUGH: 1
CONSTITUTIONAL NEGATIVE: 1
LOSS OF SENSATION IN FEET: 0

## 2024-02-21 ASSESSMENT — PATIENT HEALTH QUESTIONNAIRE - PHQ9
SUM OF ALL RESPONSES TO PHQ9 QUESTIONS 1 AND 2: 0
1. LITTLE INTEREST OR PLEASURE IN DOING THINGS: NOT AT ALL
1. LITTLE INTEREST OR PLEASURE IN DOING THINGS: NOT AT ALL
2. FEELING DOWN, DEPRESSED OR HOPELESS: NOT AT ALL
SUM OF ALL RESPONSES TO PHQ9 QUESTIONS 1 AND 2: 0
2. FEELING DOWN, DEPRESSED OR HOPELESS: NOT AT ALL

## 2024-02-21 ASSESSMENT — ACTIVITIES OF DAILY LIVING (ADL)
WALKS IN HOME: INDEPENDENT
USING TELEPHONE: INDEPENDENT
BATHING: INDEPENDENT
FEEDING YOURSELF: INDEPENDENT
PATIENT'S MEMORY ADEQUATE TO SAFELY COMPLETE DAILY ACTIVITIES?: YES
MANAGING_FINANCES: INDEPENDENT
DOING_HOUSEWORK: INDEPENDENT
EATING: INDEPENDENT
HEARING - RIGHT EAR: FUNCTIONAL
USING TRANSPORTATION: INDEPENDENT
GROCERY_SHOPPING: INDEPENDENT
GROCERY SHOPPING: INDEPENDENT
MANAGING FINANCES: INDEPENDENT
DOING HOUSEWORK: INDEPENDENT
DRESSING: INDEPENDENT
STIL DRIVING: YES
PREPARING MEALS: INDEPENDENT
BATHING: INDEPENDENT
TAKING MEDICATION: INDEPENDENT
TOILETING: INDEPENDENT
DRESSING YOURSELF: INDEPENDENT
JUDGMENT_ADEQUATE_SAFELY_COMPLETE_DAILY_ACTIVITIES: YES
HEARING - LEFT EAR: FUNCTIONAL
ADEQUATE_TO_COMPLETE_ADL: YES
GROOMING: INDEPENDENT
NEEDS ASSISTANCE WITH FOOD: INDEPENDENT
TAKING_MEDICATION: INDEPENDENT

## 2024-02-21 ASSESSMENT — COLUMBIA-SUICIDE SEVERITY RATING SCALE - C-SSRS
1. IN THE PAST MONTH, HAVE YOU WISHED YOU WERE DEAD OR WISHED YOU COULD GO TO SLEEP AND NOT WAKE UP?: NO
2. HAVE YOU ACTUALLY HAD ANY THOUGHTS OF KILLING YOURSELF?: NO

## 2024-02-21 NOTE — PROGRESS NOTES
"Subjective   Reason for Visit: Maryan Carey is an 64 y.o. female here for a Medicare Wellness visit.     Past Medical, Surgical, and Family History reviewed and updated in chart.    Reviewed all medications by prescribing practitioner or clinical pharmacist (such as prescriptions, OTCs, herbal therapies and supplements) and documented in the medical record.    64-year-old female patient was seen for welcome Medicare exam.  She was in the emergency room, they did a spiral CT, there was a multiple airspace disease identified consistent with pneumonia, she was not hospitalized, she is feeling better, also I noticed that patient is anemic, she her hemoglobin used to be around 11.3 and this time it was 10.8.  She was supposed to have a colonoscopy done but she could not manage to get it done.  She is tired and fatigued, she has no energy and this anemia is worrisome.  She has a history of anxiety she is on venlafaxine, she has a history of hypertension remains on lisinopril, her mammography was done in November report was reviewed.  Recent ER report and all the laboratories were reviewed, WBC count was normal, it was outpatient treatment of pneumonia.  As far as her wellness exam concerned she does not have living will, she stays with her , she is having multiple orthopedic procedures done in the past, she did not fall recently.        Patient Care Team:  Wyatt Peterson MD as PCP - General  Wyatt Peterson MD as PCP - [facility] PCP     Review of Systems   Constitutional: Negative.    HENT: Negative.     Eyes: Negative.    Respiratory:  Positive for cough.    Cardiovascular: Negative.    Gastrointestinal: Negative.    Musculoskeletal: Negative.    Neurological:  Negative for dizziness.   Psychiatric/Behavioral: Negative.         Objective   Vitals:  Temp 35.6 °C (96 °F) (Temporal)   Ht 1.499 m (4' 11\")   Wt 72.1 kg (159 lb)   BMI 32.11 kg/m²       Physical Exam  Constitutional:       Appearance: Normal " appearance. She is obese.   HENT:      Head: Normocephalic.      Nose: Nose normal.   Eyes:      Conjunctiva/sclera: Conjunctivae normal.   Cardiovascular:      Rate and Rhythm: Normal rate and regular rhythm.      Heart sounds: Normal heart sounds.   Pulmonary:      Effort: Pulmonary effort is normal.      Breath sounds: Normal breath sounds.   Abdominal:      General: Abdomen is flat.      Palpations: Abdomen is soft.   Musculoskeletal:         General: Normal range of motion.      Cervical back: Normal range of motion and neck supple.   Skin:     General: Skin is warm and dry.   Neurological:      General: No focal deficit present.      Mental Status: She is oriented to person, place, and time. Mental status is at baseline.   Psychiatric:         Mood and Affect: Mood normal.         Judgment: Judgment normal.       Assessment/Plan   Problem List Items Addressed This Visit    None  Visit Diagnoses       Routine general medical examination at health care facility    -  Primary    Class 1 obesity due to excess calories with serious comorbidity and body mass index (BMI) of 32.0 to 32.9 in adult        Screening for cardiovascular condition        Relevant Orders    CT cardiac scoring wo IV contrast    Screening for colorectal cancer        Relevant Orders    Colonoscopy Screening; Average Risk Patient        Wellness exam was completed, no much pulmonary findings, multiple small airspace disease is in the process of recovery.  She has obesity, she is always obese hard for her to lose weight although counseled for that.  She needs a coronary calcium score scan, upper and lower endoscopy referral was given upper endoscopy because she is more anemic, she needs a workup for anemia including iron studies and hemoglobin electrophoresis.  She will continue trazodone, venlafaxine, lisinopril, she is not taking any NSAIDs.  Please make sure that he make a living will and healthcare power of  who is her .  She  is active physically, she does not have any physical ailment or  physical disability.  Slow and progressive weight loss is always important.  She has been using the over-the-counter Prevagen because her mother has a dementia.  Wellness exam was completed, laboratories were ordered and action will be taken as appropriately, follow-up in 4 months.

## 2024-02-22 DIAGNOSIS — D64.9 ANEMIA, UNSPECIFIED TYPE: Primary | ICD-10-CM

## 2024-02-23 LAB
HEMOGLOBIN A2: 2.5 % (ref 2–3.5)
HEMOGLOBIN A: 97.2 % (ref 95.8–98)
HEMOGLOBIN F: 0.3 % (ref 0–2)
HEMOGLOBIN IDENTIFICATION INTERPRETATION: NORMAL
PATH REVIEW-HGB IDENTIFICATION: NORMAL

## 2024-02-27 ENCOUNTER — HOSPITAL ENCOUNTER (OUTPATIENT)
Dept: RADIOLOGY | Facility: CLINIC | Age: 64
Discharge: HOME | End: 2024-02-27
Payer: MEDICARE

## 2024-02-27 DIAGNOSIS — Z13.6 SCREENING FOR CARDIOVASCULAR CONDITION: ICD-10-CM

## 2024-02-27 LAB
ALBUMIN: 3.4 G/DL (ref 3.4–5)
ALPHA 1 GLOBULIN: 0.4 G/DL (ref 0.2–0.6)
ALPHA 2 GLOBULIN: 1 G/DL (ref 0.4–1.1)
BETA GLOBULIN: 1 G/DL (ref 0.5–1.2)
GAMMA GLOBULIN: 0.9 G/DL (ref 0.5–1.4)
IMMUNOFIXATION COMMENT: NORMAL
PATH REVIEW - SERUM IMMUNOFIXATION: NORMAL
PATH REVIEW-SERUM PROTEIN ELECTROPHORESIS: NORMAL
PROTEIN ELECTROPHORESIS COMMENT: NORMAL

## 2024-02-27 PROCEDURE — 75571 CT HRT W/O DYE W/CA TEST: CPT

## 2024-02-28 ENCOUNTER — APPOINTMENT (OUTPATIENT)
Dept: SURGERY | Facility: CLINIC | Age: 64
End: 2024-02-28
Payer: MEDICARE

## 2024-02-28 DIAGNOSIS — J18.9 PNEUMONIA OF RIGHT UPPER LOBE DUE TO INFECTIOUS ORGANISM: Primary | ICD-10-CM

## 2024-02-28 RX ORDER — CEFUROXIME AXETIL 500 MG/1
500 TABLET ORAL 2 TIMES DAILY
Qty: 16 TABLET | Refills: 0 | Status: SHIPPED | OUTPATIENT
Start: 2024-02-28 | End: 2024-03-07

## 2024-02-29 ENCOUNTER — APPOINTMENT (OUTPATIENT)
Dept: PRIMARY CARE | Facility: CLINIC | Age: 64
End: 2024-02-29
Payer: MEDICARE

## 2024-03-13 DIAGNOSIS — R05.9 COUGH, UNSPECIFIED TYPE: ICD-10-CM

## 2024-03-14 ENCOUNTER — HOSPITAL ENCOUNTER (OUTPATIENT)
Dept: RADIOLOGY | Facility: CLINIC | Age: 64
Discharge: HOME | End: 2024-03-14
Payer: MEDICARE

## 2024-03-14 DIAGNOSIS — R05.9 COUGH, UNSPECIFIED TYPE: ICD-10-CM

## 2024-03-14 PROCEDURE — 71046 X-RAY EXAM CHEST 2 VIEWS: CPT

## 2024-03-14 PROCEDURE — 71046 X-RAY EXAM CHEST 2 VIEWS: CPT | Performed by: RADIOLOGY

## 2024-03-15 ENCOUNTER — LAB (OUTPATIENT)
Dept: LAB | Facility: CLINIC | Age: 64
End: 2024-03-15
Payer: MEDICARE

## 2024-03-15 ENCOUNTER — OFFICE VISIT (OUTPATIENT)
Dept: HEMATOLOGY/ONCOLOGY | Facility: CLINIC | Age: 64
End: 2024-03-15
Payer: MEDICARE

## 2024-03-15 ENCOUNTER — TELEPHONE (OUTPATIENT)
Dept: HEMATOLOGY/ONCOLOGY | Facility: CLINIC | Age: 64
End: 2024-03-15
Payer: MEDICARE

## 2024-03-15 VITALS
HEART RATE: 100 BPM | RESPIRATION RATE: 18 BRPM | SYSTOLIC BLOOD PRESSURE: 120 MMHG | OXYGEN SATURATION: 97 % | TEMPERATURE: 97.3 F | WEIGHT: 155.87 LBS | BODY MASS INDEX: 31.48 KG/M2 | DIASTOLIC BLOOD PRESSURE: 76 MMHG

## 2024-03-15 DIAGNOSIS — D64.9 ANEMIA, UNSPECIFIED TYPE: ICD-10-CM

## 2024-03-15 DIAGNOSIS — D50.8 OTHER IRON DEFICIENCY ANEMIA: Primary | ICD-10-CM

## 2024-03-15 DIAGNOSIS — E78.00 HYPERCHOLESTEROLEMIA: ICD-10-CM

## 2024-03-15 DIAGNOSIS — F41.9 ANXIETY DISORDER, UNSPECIFIED TYPE: ICD-10-CM

## 2024-03-15 DIAGNOSIS — I10 BENIGN ESSENTIAL HYPERTENSION: ICD-10-CM

## 2024-03-15 LAB
BASOPHILS # BLD AUTO: 0.02 X10*3/UL (ref 0–0.1)
BASOPHILS NFR BLD AUTO: 0.4 %
DAT-POLYSPECIFIC: NORMAL
EOSINOPHIL # BLD AUTO: 0.33 X10*3/UL (ref 0–0.7)
EOSINOPHIL NFR BLD AUTO: 6.3 %
ERYTHROCYTE [DISTWIDTH] IN BLOOD BY AUTOMATED COUNT: 14.4 % (ref 11.5–14.5)
FOLATE SERPL-MCNC: >24 NG/ML
HCT VFR BLD AUTO: 35.9 % (ref 36–46)
HGB BLD-MCNC: 11.2 G/DL (ref 12–16)
HGB RETIC QN: 29 PG (ref 28–38)
HOLD SPECIMEN: NORMAL
IMM GRANULOCYTES # BLD AUTO: 0 X10*3/UL (ref 0–0.7)
IMM GRANULOCYTES NFR BLD AUTO: 0 % (ref 0–0.9)
IMMATURE RETIC FRACTION: 12.9 %
LDH SERPL L TO P-CCNC: 180 U/L (ref 84–246)
LYMPHOCYTES # BLD AUTO: 2.3 X10*3/UL (ref 1.2–4.8)
LYMPHOCYTES NFR BLD AUTO: 43.9 %
MCH RBC QN AUTO: 26.7 PG (ref 26–34)
MCHC RBC AUTO-ENTMCNC: 31.2 G/DL (ref 32–36)
MCV RBC AUTO: 86 FL (ref 80–100)
MONOCYTES # BLD AUTO: 0.32 X10*3/UL (ref 0.1–1)
MONOCYTES NFR BLD AUTO: 6.1 %
NEUTROPHILS # BLD AUTO: 2.27 X10*3/UL (ref 1.2–7.7)
NEUTROPHILS NFR BLD AUTO: 43.3 %
PLATELET # BLD AUTO: 305 X10*3/UL (ref 150–450)
RBC # BLD AUTO: 4.19 X10*6/UL (ref 4–5.2)
RETICS #: 0.1 X10*6/UL (ref 0.02–0.08)
RETICS/RBC NFR AUTO: 2.4 % (ref 0.5–2)
VIT B12 SERPL-MCNC: 532 PG/ML (ref 211–911)
WBC # BLD AUTO: 5.2 X10*3/UL (ref 4.4–11.3)

## 2024-03-15 PROCEDURE — 85045 AUTOMATED RETICULOCYTE COUNT: CPT

## 2024-03-15 PROCEDURE — 82746 ASSAY OF FOLIC ACID SERUM: CPT

## 2024-03-15 PROCEDURE — 83521 IG LIGHT CHAINS FREE EACH: CPT

## 2024-03-15 PROCEDURE — 1159F MED LIST DOCD IN RCRD: CPT | Performed by: INTERNAL MEDICINE

## 2024-03-15 PROCEDURE — 82607 VITAMIN B-12: CPT

## 2024-03-15 PROCEDURE — 3074F SYST BP LT 130 MM HG: CPT | Performed by: INTERNAL MEDICINE

## 2024-03-15 PROCEDURE — 86880 COOMBS TEST DIRECT: CPT

## 2024-03-15 PROCEDURE — 1036F TOBACCO NON-USER: CPT | Performed by: INTERNAL MEDICINE

## 2024-03-15 PROCEDURE — 83010 ASSAY OF HAPTOGLOBIN QUANT: CPT

## 2024-03-15 PROCEDURE — 36415 COLL VENOUS BLD VENIPUNCTURE: CPT

## 2024-03-15 PROCEDURE — 1126F AMNT PAIN NOTED NONE PRSNT: CPT | Performed by: INTERNAL MEDICINE

## 2024-03-15 PROCEDURE — 1123F ACP DISCUSS/DSCN MKR DOCD: CPT | Performed by: INTERNAL MEDICINE

## 2024-03-15 PROCEDURE — 99204 OFFICE O/P NEW MOD 45 MIN: CPT | Performed by: INTERNAL MEDICINE

## 2024-03-15 PROCEDURE — 99214 OFFICE O/P EST MOD 30 MIN: CPT | Performed by: INTERNAL MEDICINE

## 2024-03-15 PROCEDURE — 3078F DIAST BP <80 MM HG: CPT | Performed by: INTERNAL MEDICINE

## 2024-03-15 PROCEDURE — 85025 COMPLETE CBC W/AUTO DIFF WBC: CPT

## 2024-03-15 PROCEDURE — 83615 LACTATE (LD) (LDH) ENZYME: CPT

## 2024-03-15 PROCEDURE — 1160F RVW MEDS BY RX/DR IN RCRD: CPT | Performed by: INTERNAL MEDICINE

## 2024-03-15 PROCEDURE — 3008F BODY MASS INDEX DOCD: CPT | Performed by: INTERNAL MEDICINE

## 2024-03-15 ASSESSMENT — PAIN SCALES - GENERAL: PAINLEVEL: 0-NO PAIN

## 2024-03-15 NOTE — PATIENT INSTRUCTIONS
Continue the OTC iron supplement for now    Please let us know if you develop severe stomach upset from it    I will do phone followup with you in early April as the next step

## 2024-03-16 LAB — HAPTOGLOB SERPL-MCNC: 282 MG/DL (ref 37–246)

## 2024-03-17 LAB
KAPPA LC SERPL-MCNC: 2.53 MG/DL (ref 0.33–1.94)
KAPPA LC/LAMBDA SER: 1.54 {RATIO} (ref 0.26–1.65)
LAMBDA LC SERPL-MCNC: 1.64 MG/DL (ref 0.57–2.63)

## 2024-03-23 PROBLEM — D64.9 ANEMIA: Status: ACTIVE | Noted: 2024-03-23

## 2024-03-23 ASSESSMENT — ENCOUNTER SYMPTOMS
CARDIOVASCULAR NEGATIVE: 1
GASTROINTESTINAL NEGATIVE: 1
EYES NEGATIVE: 1
CONSTITUTIONAL NEGATIVE: 1
HEMATOLOGIC/LYMPHATIC NEGATIVE: 1
RESPIRATORY NEGATIVE: 1
PSYCHIATRIC NEGATIVE: 1
ENDOCRINE NEGATIVE: 1
MUSCULOSKELETAL NEGATIVE: 1
NEUROLOGICAL NEGATIVE: 1

## 2024-03-23 NOTE — PROGRESS NOTES
Patient ID: Maryan Carey is a 64 y.o. female.  Referring Physician: Wyatt Peterson MD  [address] [id number]  Primary Care Provider: Wyatt Peterson MD  Visit Type: Initial Visit      Subjective    HPI I have anemia  I have been taking iron pills     Review of Systems   Constitutional: Negative.    HENT:  Negative.     Eyes: Negative.    Respiratory: Negative.     Cardiovascular: Negative.    Gastrointestinal: Negative.    Endocrine: Negative.    Genitourinary: Negative.     Musculoskeletal: Negative.    Skin: Negative.    Neurological: Negative.    Hematological: Negative.    Psychiatric/Behavioral: Negative.          Objective   BSA: 1.72 meters squared  /76   Pulse 100   Temp 36.3 °C (97.3 °F) (Temporal)   Resp 18   Wt 70.7 kg (155 lb 13.8 oz)   SpO2 97%   BMI 31.48 kg/m²      has no past medical history on file.   has a past surgical history that includes Other surgical history (10/19/2021); Other surgical history (11/18/2019); Other surgical history (11/18/2019); and XR chest pacemaker w fluoro (5/18/2020).  Family History   Problem Relation Name Age of Onset    Dementia Mother      Hypertension Mother      Lung cancer Father          Mesothelioma of lung     Oncology History    No history exists.       Maryan Carey  reports that she has never smoked. She has never used smokeless tobacco.  She  reports that she does not currently use alcohol.  She  reports no history of drug use.    Physical Exam  Vitals reviewed.   Constitutional:       Appearance: Normal appearance.   HENT:      Head: Normocephalic.      Mouth/Throat:      Mouth: Mucous membranes are moist.   Eyes:      Extraocular Movements: Extraocular movements intact.      Pupils: Pupils are equal, round, and reactive to light.   Cardiovascular:      Rate and Rhythm: Regular rhythm.      Pulses: Normal pulses.      Heart sounds: Normal heart sounds.   Pulmonary:      Breath sounds: Normal breath sounds.   Abdominal:       General: Bowel sounds are normal.      Palpations: Abdomen is soft.   Musculoskeletal:         General: Normal range of motion.      Cervical back: Normal range of motion and neck supple.   Skin:     General: Skin is warm and dry.   Neurological:      General: No focal deficit present.      Mental Status: She is alert and oriented to person, place, and time.   Psychiatric:         Mood and Affect: Mood normal.         Behavior: Behavior normal.         WBC   Date/Time Value Ref Range Status   03/15/2024 02:03 PM 5.2 4.4 - 11.3 x10*3/uL Final   02/21/2024 10:48 AM 3.2 (L) 4.4 - 11.3 x10*3/uL Final   02/17/2023 05:28 AM 9.0 4.4 - 11.3 x10E9/L Final   02/07/2023 10:57 AM 5.3 4.4 - 11.3 x10E9/L Final   04/18/2022 07:20 AM 5.7 4.4 - 11.3 x10E9/L Final     nRBC   Date Value Ref Range Status   02/21/2024 0.0 0.0 - 0.0 /100 WBCs Final   02/17/2023 0.0 0.0 - 0.0 /100 WBC Final   02/07/2023 0.0 0.0 - 0.0 /100 WBC Final     RBC   Date Value Ref Range Status   03/15/2024 4.19 4.00 - 5.20 x10*6/uL Final   02/21/2024 4.05 4.00 - 5.20 x10*6/uL Final   02/17/2023 3.69 (L) 4.00 - 5.20 x10E12/L Final   02/07/2023 4.53 4.00 - 5.20 x10E12/L Final   04/18/2022 4.41 4.00 - 5.20 x10E12/L Final     Hemoglobin   Date Value Ref Range Status   03/15/2024 11.2 (L) 12.0 - 16.0 g/dL Final   02/21/2024 10.6 (L) 12.0 - 16.0 g/dL Final   02/17/2023 9.5 (L) 12.0 - 16.0 g/dL Final   02/07/2023 11.8 (L) 12.0 - 16.0 g/dL Final   04/18/2022 11.8 (L) 12.0 - 16.0 g/dL Final     Hematocrit   Date Value Ref Range Status   03/15/2024 35.9 (L) 36.0 - 46.0 % Final   02/21/2024 35.5 (L) 36.0 - 46.0 % Final   02/17/2023 32.1 (L) 36.0 - 46.0 % Final   02/07/2023 39.0 36.0 - 46.0 % Final   04/18/2022 39.2 36.0 - 46.0 % Final     MCV   Date/Time Value Ref Range Status   03/15/2024 02:03 PM 86 80 - 100 fL Final   02/21/2024 10:48 AM 88 80 - 100 fL Final   02/17/2023 05:28 AM 87 80 - 100 fL Final   02/07/2023 10:57 AM 86 80 - 100 fL Final   04/18/2022 07:20 AM  "89 80 - 100 fL Final     MCH   Date/Time Value Ref Range Status   03/15/2024 02:03 PM 26.7 26.0 - 34.0 pg Final   02/21/2024 10:48 AM 26.2 26.0 - 34.0 pg Final     MCHC   Date/Time Value Ref Range Status   03/15/2024 02:03 PM 31.2 (L) 32.0 - 36.0 g/dL Final   02/21/2024 10:48 AM 29.9 (L) 32.0 - 36.0 g/dL Final   02/17/2023 05:28 AM 29.6 (L) 32.0 - 36.0 g/dL Final   02/07/2023 10:57 AM 30.3 (L) 32.0 - 36.0 g/dL Final   04/18/2022 07:20 AM 30.1 (L) 32.0 - 36.0 g/dL Final     RDW   Date/Time Value Ref Range Status   03/15/2024 02:03 PM 14.4 11.5 - 14.5 % Final   02/21/2024 10:48 AM 14.0 11.5 - 14.5 % Final   02/17/2023 05:28 AM 13.5 11.5 - 14.5 % Final   02/07/2023 10:57 AM 13.8 11.5 - 14.5 % Final   04/18/2022 07:20 AM 13.4 11.5 - 14.5 % Final     Platelets   Date/Time Value Ref Range Status   03/15/2024 02:03  150 - 450 x10*3/uL Final   02/21/2024 10:48  150 - 450 x10*3/uL Final   02/17/2023 05:28  150 - 450 x10E9/L Final   02/07/2023 10:57  150 - 450 x10E9/L Final   04/18/2022 07:20  150 - 450 x10E9/L Final     No results found for: \"MPV\"  Neutrophils %   Date/Time Value Ref Range Status   03/15/2024 02:03 PM 43.3 40.0 - 80.0 % Final   02/21/2024 10:48 AM 38.2 40.0 - 80.0 % Final   02/17/2023 05:28 AM 69.8 40.0 - 80.0 % Final   02/07/2023 10:57 AM 45.3 40.0 - 80.0 % Final   09/13/2021 09:17 AM 51.1 40.0 - 80.0 % Final     Immature Granulocytes %, Automated   Date/Time Value Ref Range Status   03/15/2024 02:03 PM 0.0 0.0 - 0.9 % Final     Comment:     Immature Granulocyte Count (IG) includes promyelocytes, myelocytes and metamyelocytes but does not include bands. Percent differential counts (%) should be interpreted in the context of the absolute cell counts (cells/UL).   02/21/2024 10:48 AM 0.3 0.0 - 0.9 % Final     Comment:     Immature Granulocyte Count (IG) includes promyelocytes, myelocytes and metamyelocytes but does not include bands. Percent differential counts (%) should be " interpreted in the context of the absolute cell counts (cells/UL).   02/17/2023 05:28 AM 0.3 0.0 - 0.9 % Final     Comment:      Immature Granulocyte Count (IG) includes promyelocytes,    myelocytes and metamyelocytes but does not include bands.   Percent differential counts (%) should be interpreted in the   context of the absolute cell counts (cells/L).     02/07/2023 10:57 AM 0.2 0.0 - 0.9 % Final     Comment:      Immature Granulocyte Count (IG) includes promyelocytes,    myelocytes and metamyelocytes but does not include bands.   Percent differential counts (%) should be interpreted in the   context of the absolute cell counts (cells/L).     09/13/2021 09:17 AM 0.0 0.0 - 0.9 % Final     Comment:      Immature Granulocyte Count (IG) includes promyelocytes,    myelocytes and metamyelocytes but does not include bands.   Percent differential counts (%) should be interpreted in the   context of the absolute cell counts (cells/L).       Lymphocytes %   Date/Time Value Ref Range Status   03/15/2024 02:03 PM 43.9 13.0 - 44.0 % Final   02/21/2024 10:48 AM 48.8 13.0 - 44.0 % Final   02/17/2023 05:28 AM 21.6 13.0 - 44.0 % Final   02/07/2023 10:57 AM 42.0 13.0 - 44.0 % Final   09/13/2021 09:17 AM 35.3 13.0 - 44.0 % Final     Monocytes %   Date/Time Value Ref Range Status   03/15/2024 02:03 PM 6.1 2.0 - 10.0 % Final   02/21/2024 10:48 AM 9.3 2.0 - 10.0 % Final   02/17/2023 05:28 AM 7.9 2.0 - 10.0 % Final   02/07/2023 10:57 AM 8.7 2.0 - 10.0 % Final   09/13/2021 09:17 AM 10.5 2.0 - 10.0 % Final     Eosinophils %   Date/Time Value Ref Range Status   03/15/2024 02:03 PM 6.3 0.0 - 6.0 % Final   02/21/2024 10:48 AM 3.1 0.0 - 6.0 % Final   02/17/2023 05:28 AM 0.3 0.0 - 6.0 % Final   02/07/2023 10:57 AM 3.0 0.0 - 6.0 % Final   09/13/2021 09:17 AM 2.7 0.0 - 6.0 % Final     Basophils %   Date/Time Value Ref Range Status   03/15/2024 02:03 PM 0.4 0.0 - 2.0 % Final   02/21/2024 10:48 AM 0.3 0.0 - 2.0 % Final   02/17/2023 05:28 AM 0.1  0.0 - 2.0 % Final   02/07/2023 10:57 AM 0.8 0.0 - 2.0 % Final   09/13/2021 09:17 AM 0.4 0.0 - 2.0 % Final     Neutrophils Absolute   Date/Time Value Ref Range Status   03/15/2024 02:03 PM 2.27 1.20 - 7.70 x10*3/uL Final     Comment:     Percent differential counts (%) should be interpreted in the context of the absolute cell counts (cells/uL).   02/21/2024 10:48 AM 1.24 1.20 - 7.70 x10*3/uL Final     Comment:     Percent differential counts (%) should be interpreted in the context of the absolute cell counts (cells/uL).   02/17/2023 05:28 AM 6.31 1.20 - 7.70 x10E9/L Final   02/07/2023 10:57 AM 2.41 1.20 - 7.70 x10E9/L Final   09/13/2021 09:17 AM 2.64 1.20 - 7.70 x10E9/L Final     Immature Granulocytes Absolute, Automated   Date/Time Value Ref Range Status   03/15/2024 02:03 PM 0.00 0.00 - 0.70 x10*3/uL Final   02/21/2024 10:48 AM 0.01 0.00 - 0.70 x10*3/uL Final     Lymphocytes Absolute   Date/Time Value Ref Range Status   03/15/2024 02:03 PM 2.30 1.20 - 4.80 x10*3/uL Final   02/21/2024 10:48 AM 1.58 1.20 - 4.80 x10*3/uL Final   02/17/2023 05:28 AM 1.95 1.20 - 4.80 x10E9/L Final   02/07/2023 10:57 AM 2.23 1.20 - 4.80 x10E9/L Final   09/13/2021 09:17 AM 1.82 1.20 - 4.80 x10E9/L Final     Monocytes Absolute   Date/Time Value Ref Range Status   03/15/2024 02:03 PM 0.32 0.10 - 1.00 x10*3/uL Final   02/21/2024 10:48 AM 0.30 0.10 - 1.00 x10*3/uL Final   02/17/2023 05:28 AM 0.71 0.10 - 1.00 x10E9/L Final   02/07/2023 10:57 AM 0.46 0.10 - 1.00 x10E9/L Final   09/13/2021 09:17 AM 0.54 0.10 - 1.00 x10E9/L Final     Eosinophils Absolute   Date/Time Value Ref Range Status   03/15/2024 02:03 PM 0.33 0.00 - 0.70 x10*3/uL Final   02/21/2024 10:48 AM 0.10 0.00 - 0.70 x10*3/uL Final   02/17/2023 05:28 AM 0.03 0.00 - 0.70 x10E9/L Final   02/07/2023 10:57 AM 0.16 0.00 - 0.70 x10E9/L Final   09/13/2021 09:17 AM 0.14 0.00 - 0.70 x10E9/L Final     Basophils Absolute   Date/Time Value Ref Range Status   03/15/2024 02:03 PM 0.02 0.00 - 0.10  "x10*3/uL Final   02/21/2024 10:48 AM 0.01 0.00 - 0.10 x10*3/uL Final   02/17/2023 05:28 AM 0.01 0.00 - 0.10 x10E9/L Final   02/07/2023 10:57 AM 0.04 0.00 - 0.10 x10E9/L Final   09/13/2021 09:17 AM 0.02 0.00 - 0.10 x10E9/L Final       No components found for: \"PT\"  aPTT   Date/Time Value Ref Range Status   02/07/2023 10:57 AM 33 26 - 39 sec Final     Comment:       THE APTT IS NO LONGER USED FOR MONITORING     UNFRACTIONATED HEPARIN THERAPY.    FOR MONITORING HEPARIN THERAPY,     USE THE HEPARIN ASSAY.     02/22/2021 10:24 AM 25 25 - 35 sec Final     Comment:       THE APTT IS NO LONGER USED FOR MONITORING     UNFRACTIONATED HEPARIN THERAPY.    FOR MONITORING HEPARIN THERAPY,     USE THE HEPARIN ASSAY.     05/08/2020 08:33 AM 32 28 - 38 sec Final     Comment:       THE APTT IS NO LONGER USED FOR MONITORING     UNFRACTIONATED HEPARIN THERAPY.    FOR MONITORING HEPARIN THERAPY,     USE THE HEPARIN ASSAY.       Medication Documentation Review Audit       Reviewed by IGNACIA Blum (Patient Care Technician) on 03/15/24 at 1338      Medication Order Taking? Sig Documenting Provider Last Dose Status   atorvastatin (Lipitor) 20 mg tablet [id number] Yes TAKE ONE TABLET BY MOUTH EVERY DAY Wyatt Peterson MD Taking Active   cetirizine (ZyrTEC) 10 mg tablet [id number] Yes Take 1 tablet (10 mg) by mouth once daily at bedtime. Historical Provider, MD Taking Active   diclofenac sodium 1 % kit [id number] Yes APPLY SPARINGLY TO AFFECTED AREA EVERY DAY Historical Provider, MD Taking Active   ferrous sulfate, 325 mg ferrous sulfate, (Iron, ferrous sulfate,) tablet [id number] Yes Take one capsule 5 times a week in empty stomach Wyatt Peterson MD Taking Active   lisinopriL-hydrochlorothiazide 20-12.5 mg tablet [id number] Yes TAKE ONE TABLET BY MOUTH EVERY DAY Wyatt Peterson MD Taking Active   traZODone (Desyrel) 50 mg tablet [id number] Yes TAKE ONE TABLET BY MOUTH AT BEDTIME AS NEEDED for sleep Wyatt Peterson MD Taking Active " "  venlafaxine XR (Effexor-XR) 37.5 mg 24 hr capsule [id number] Yes TAKE ONE CAPSULE BY MOUTH DAILY WITH FOOD Wyatt Peterson MD Taking Active                   Assessment/Plan    1) anemia  -I reviewed her lab history in the EMR  -1/20/2020 wbc 6.1, hgb 12, plt 344,000  -5/8/2020 wbc 5.3, hgb 12.2, plt 320,000  -5/19/2020 wbc 12, hgb 10.3, MCV 88, plt 245,000  -2/22/2021 wbc 5.1, hgb 12.2, plt 355,000  -3/9/2021 wbc 10.6, hgb 9.5, MCV 86, plt 285,000  -9/13/2021 wbc 5.2, hgb 11.4, plt 347,000  -4/18/2022 wbc 5.7, hgb 11.8, plt 325,000  -2/7/2023 wbc 5.3, hgb 11.8, plt 345,000  -2/17/2023 wbc 9.0, hgb 9.5, plt 279,000  -2/17/2024 wbc 7.9, hgb 10.2, MCV 85.3, plt 261,000  -2/21/2024 wbc 3.2, hgb 10.6, MCV 88, plt 294,000, creatinine 0.89, calcium 9.1, AST 33, ALT 20, TIBC 328, sat 8%, ferritin 529, hgb electrophoresis no evidence of hemoglobinopathy  -less than a month ago she started taking PO iron 5 days a week; she did not get the message from her PCP to stop taking PO iron because \"ferritin very high\" and she has continued to take iron without incident  -she clearly is iron deficient, but will make sure there are no other contributions to her anemia  -will recheck CBC and review peripheral smear  -will also check B12/folate, serum free light chains, retic, haptoglobin, and LDH  -oral repletion of iron can take minimum 3-6 months  -advised her to continue with PO iron for now and we will recheck labs in a few months    2) anxiety  -on venlafaxine    3) hypertension  -on lisinopril-hydrochlorothiazide    4) hyperlipidemia  -on atorvastatin     Problem List Items Addressed This Visit    None  Visit Diagnoses         Codes    Anemia, unspecified type     D64.9    Relevant Orders    CBC and Auto Differential (Completed)    Lactate dehydrogenase (Completed)    Direct antiglobulin test (Completed)    Haptoglobin (Completed)    Reticulocytes (Completed)    [place]/Lambda Free Light Chain, Serum (Completed)    Vitamin B12 " (Completed)    Folate (Completed)    Slide Request (Completed)    Clinic Appointment Request Virtual Est; JANES TRINH; [id number]                 Janes Trinh MD

## 2024-04-08 ENCOUNTER — APPOINTMENT (OUTPATIENT)
Dept: HEMATOLOGY/ONCOLOGY | Facility: CLINIC | Age: 64
End: 2024-04-08
Payer: MEDICARE

## 2024-04-10 ENCOUNTER — OFFICE VISIT (OUTPATIENT)
Dept: FAMILY MEDICINE CLINIC | Age: 64
End: 2024-04-10
Payer: MEDICARE

## 2024-04-10 ENCOUNTER — APPOINTMENT (OUTPATIENT)
Dept: HEMATOLOGY/ONCOLOGY | Facility: CLINIC | Age: 64
End: 2024-04-10
Payer: MEDICARE

## 2024-04-10 VITALS
DIASTOLIC BLOOD PRESSURE: 70 MMHG | OXYGEN SATURATION: 100 % | SYSTOLIC BLOOD PRESSURE: 112 MMHG | HEART RATE: 65 BPM | WEIGHT: 160 LBS | TEMPERATURE: 97.3 F | BODY MASS INDEX: 33.45 KG/M2

## 2024-04-10 DIAGNOSIS — Z12.11 COLON CANCER SCREENING: ICD-10-CM

## 2024-04-10 DIAGNOSIS — R09.89 LEFT CAROTID BRUIT: ICD-10-CM

## 2024-04-10 DIAGNOSIS — I10 HYPERTENSION, UNSPECIFIED TYPE: ICD-10-CM

## 2024-04-10 DIAGNOSIS — R01.1 HEART MURMUR: Primary | ICD-10-CM

## 2024-04-10 DIAGNOSIS — E78.5 HYPERLIPIDEMIA, UNSPECIFIED HYPERLIPIDEMIA TYPE: ICD-10-CM

## 2024-04-10 PROCEDURE — 93000 ELECTROCARDIOGRAM COMPLETE: CPT | Performed by: FAMILY MEDICINE

## 2024-04-10 PROCEDURE — 99204 OFFICE O/P NEW MOD 45 MIN: CPT | Performed by: FAMILY MEDICINE

## 2024-04-10 PROCEDURE — 3074F SYST BP LT 130 MM HG: CPT | Performed by: FAMILY MEDICINE

## 2024-04-10 PROCEDURE — 1123F ACP DISCUSS/DSCN MKR DOCD: CPT | Performed by: FAMILY MEDICINE

## 2024-04-10 PROCEDURE — 3078F DIAST BP <80 MM HG: CPT | Performed by: FAMILY MEDICINE

## 2024-04-10 RX ORDER — FERROUS SULFATE 325(65) MG
325 TABLET ORAL
COMMUNITY

## 2024-04-10 ASSESSMENT — ENCOUNTER SYMPTOMS
COUGH: 0
SHORTNESS OF BREATH: 0
DIARRHEA: 0
VOMITING: 0
CHEST TIGHTNESS: 0
NAUSEA: 0
BLOOD IN STOOL: 0
ABDOMINAL PAIN: 0
APNEA: 0
CONSTIPATION: 0

## 2024-04-10 ASSESSMENT — PATIENT HEALTH QUESTIONNAIRE - PHQ9
5. POOR APPETITE OR OVEREATING: NOT AT ALL
10. IF YOU CHECKED OFF ANY PROBLEMS, HOW DIFFICULT HAVE THESE PROBLEMS MADE IT FOR YOU TO DO YOUR WORK, TAKE CARE OF THINGS AT HOME, OR GET ALONG WITH OTHER PEOPLE: NOT DIFFICULT AT ALL
6. FEELING BAD ABOUT YOURSELF - OR THAT YOU ARE A FAILURE OR HAVE LET YOURSELF OR YOUR FAMILY DOWN: NOT AT ALL
2. FEELING DOWN, DEPRESSED OR HOPELESS: NOT AT ALL
3. TROUBLE FALLING OR STAYING ASLEEP: NOT AT ALL
4. FEELING TIRED OR HAVING LITTLE ENERGY: NOT AT ALL
9. THOUGHTS THAT YOU WOULD BE BETTER OFF DEAD, OR OF HURTING YOURSELF: NOT AT ALL
SUM OF ALL RESPONSES TO PHQ QUESTIONS 1-9: 0
8. MOVING OR SPEAKING SO SLOWLY THAT OTHER PEOPLE COULD HAVE NOTICED. OR THE OPPOSITE, BEING SO FIGETY OR RESTLESS THAT YOU HAVE BEEN MOVING AROUND A LOT MORE THAN USUAL: NOT AT ALL
SUM OF ALL RESPONSES TO PHQ QUESTIONS 1-9: 0
7. TROUBLE CONCENTRATING ON THINGS, SUCH AS READING THE NEWSPAPER OR WATCHING TELEVISION: NOT AT ALL
SUM OF ALL RESPONSES TO PHQ QUESTIONS 1-9: 0
SUM OF ALL RESPONSES TO PHQ QUESTIONS 1-9: 0
1. LITTLE INTEREST OR PLEASURE IN DOING THINGS: NOT AT ALL
SUM OF ALL RESPONSES TO PHQ9 QUESTIONS 1 & 2: 0

## 2024-04-10 NOTE — PROGRESS NOTES
Subjective:      Patient ID: Norma Carrizales is a 64 y.o. female who presents today for:  Chief Complaint   Patient presents with    New Patient     Pt would like to discuss changing medication from Lipitor        HPI  Norma Carrizales is a very pleasant 64-year-old female presents today to establish care.  She has a history of hypertension that has been well-controlled on her current medication for many years.  She does however have concerns about lisinopril as she is her about potential side effects of angioedema from friends and members of her [employer].  Electrolytes and kidney function have consistently been within normal limits.  She denies any chest pain, shortness of breath or lower extremity edema.  She does report stress testing greater than 10 years ago that returned normal.  She was recently in the emergency room in February with an EKG that showed normal sinus rhythm  Hyperlipidemia: Has been well-controlled.  Patient denies any significant arthralgias or myalgias and liver function has been within normal limits  Iron deficiency anemia: She is being seen by hematology.  She is on iron supplementation and plans to follow-up within the next 2 months.  If hemoglobin levels have not improved, iron infusions will be considered  Memory: Patient has noticed mild issues with memory.  She does not want to know if she has Alzheimer's.  However her mother was diagnosed with Alzheimer's in her late 70s.      Past Medical History:   Diagnosis Date    Hypertension      Past Surgical History:   Procedure Laterality Date    KNEE ARTHROSCOPY Left 2018    TOTAL KNEE ARTHROPLASTY Left 2021    TOTAL KNEE ARTHROPLASTY Right 2023     Family History   Problem Relation Age of Onset    Diabetes Mother     Breast Cancer Neg Hx     Cancer Neg Hx     Colon Cancer Neg Hx     Eclampsia Neg Hx     Hypertension Neg Hx     Ovarian Cancer Neg Hx      Labor Neg Hx     Spont Abortions Neg Hx     Stroke Neg Hx

## 2024-04-16 ENCOUNTER — TELEMEDICINE (OUTPATIENT)
Dept: HEMATOLOGY/ONCOLOGY | Facility: CLINIC | Age: 64
End: 2024-04-16
Payer: MEDICARE

## 2024-04-16 DIAGNOSIS — D50.8 OTHER IRON DEFICIENCY ANEMIA: Primary | ICD-10-CM

## 2024-04-16 DIAGNOSIS — E78.00 HYPERCHOLESTEROLEMIA: ICD-10-CM

## 2024-04-16 DIAGNOSIS — I10 BENIGN ESSENTIAL HYPERTENSION: ICD-10-CM

## 2024-04-16 DIAGNOSIS — F41.9 ANXIETY DISORDER, UNSPECIFIED TYPE: ICD-10-CM

## 2024-04-16 PROCEDURE — 1159F MED LIST DOCD IN RCRD: CPT | Performed by: INTERNAL MEDICINE

## 2024-04-16 PROCEDURE — 99442 PR PHYS/QHP TELEPHONE EVALUATION 11-20 MIN: CPT | Performed by: INTERNAL MEDICINE

## 2024-04-16 PROCEDURE — 1123F ACP DISCUSS/DSCN MKR DOCD: CPT | Performed by: INTERNAL MEDICINE

## 2024-04-16 PROCEDURE — 3008F BODY MASS INDEX DOCD: CPT | Performed by: INTERNAL MEDICINE

## 2024-04-16 PROCEDURE — 1160F RVW MEDS BY RX/DR IN RCRD: CPT | Performed by: INTERNAL MEDICINE

## 2024-04-16 RX ORDER — LISINOPRIL AND HYDROCHLOROTHIAZIDE 12.5; 2 MG/1; MG/1
1 TABLET ORAL DAILY
Qty: 90 TABLET | Refills: 3 | Status: SHIPPED | OUTPATIENT
Start: 2024-04-16

## 2024-04-18 ASSESSMENT — ENCOUNTER SYMPTOMS
PSYCHIATRIC NEGATIVE: 1
RESPIRATORY NEGATIVE: 1
ENDOCRINE NEGATIVE: 1
MUSCULOSKELETAL NEGATIVE: 1
CONSTITUTIONAL NEGATIVE: 1
EYES NEGATIVE: 1
CARDIOVASCULAR NEGATIVE: 1
NEUROLOGICAL NEGATIVE: 1
HEMATOLOGIC/LYMPHATIC NEGATIVE: 1
GASTROINTESTINAL NEGATIVE: 1

## 2024-04-19 NOTE — PROGRESS NOTES
Patient ID: Maryan Carey is a 64 y.o. female.  Referring Physician: No referring provider defined for this encounter.  Primary Care Provider: Wyatt Peterson MD  Visit Type:  Follow Up     Verbal consent was requested and obtained from patient on this date for a telehealth visit.    Subjective    HPI How was my bloodwork?    Review of Systems   Constitutional: Negative.    HENT:  Negative.     Eyes: Negative.    Respiratory: Negative.     Cardiovascular: Negative.    Gastrointestinal: Negative.    Endocrine: Negative.    Genitourinary: Negative.     Musculoskeletal: Negative.    Skin: Negative.    Neurological: Negative.    Hematological: Negative.    Psychiatric/Behavioral: Negative.          Objective   BSA: There is no height or weight on file to calculate BSA.  There were no vitals taken for this visit.     has no past medical history on file.   has a past surgical history that includes Other surgical history (10/19/2021); Other surgical history (11/18/2019); Other surgical history (11/18/2019); and XR chest pacemaker w fluoro (5/18/2020).  Family History   Problem Relation Name Age of Onset    Dementia Mother      Hypertension Mother      Lung cancer Father          Mesothelioma of lung     Oncology History    No history exists.       Maryan Carey  reports that she has never smoked. She has never used smokeless tobacco.  She  reports that she does not currently use alcohol.  She  reports no history of drug use.    Physical Exam    WBC   Date/Time Value Ref Range Status   03/15/2024 02:03 PM 5.2 4.4 - 11.3 x10*3/uL Final   02/21/2024 10:48 AM 3.2 (L) 4.4 - 11.3 x10*3/uL Final   02/17/2023 05:28 AM 9.0 4.4 - 11.3 x10E9/L Final   02/07/2023 10:57 AM 5.3 4.4 - 11.3 x10E9/L Final   04/18/2022 07:20 AM 5.7 4.4 - 11.3 x10E9/L Final     nRBC   Date Value Ref Range Status   02/21/2024 0.0 0.0 - 0.0 /100 WBCs Final   02/17/2023 0.0 0.0 - 0.0 /100 WBC Final   02/07/2023 0.0 0.0 - 0.0 /100 WBC Final     RBC   Date  Value Ref Range Status   03/15/2024 4.19 4.00 - 5.20 x10*6/uL Final   02/21/2024 4.05 4.00 - 5.20 x10*6/uL Final   02/17/2023 3.69 (L) 4.00 - 5.20 x10E12/L Final   02/07/2023 4.53 4.00 - 5.20 x10E12/L Final   04/18/2022 4.41 4.00 - 5.20 x10E12/L Final     Hemoglobin   Date Value Ref Range Status   03/15/2024 11.2 (L) 12.0 - 16.0 g/dL Final   02/21/2024 10.6 (L) 12.0 - 16.0 g/dL Final   02/17/2023 9.5 (L) 12.0 - 16.0 g/dL Final   02/07/2023 11.8 (L) 12.0 - 16.0 g/dL Final   04/18/2022 11.8 (L) 12.0 - 16.0 g/dL Final     Hematocrit   Date Value Ref Range Status   03/15/2024 35.9 (L) 36.0 - 46.0 % Final   02/21/2024 35.5 (L) 36.0 - 46.0 % Final   02/17/2023 32.1 (L) 36.0 - 46.0 % Final   02/07/2023 39.0 36.0 - 46.0 % Final   04/18/2022 39.2 36.0 - 46.0 % Final     MCV   Date/Time Value Ref Range Status   03/15/2024 02:03 PM 86 80 - 100 fL Final   02/21/2024 10:48 AM 88 80 - 100 fL Final   02/17/2023 05:28 AM 87 80 - 100 fL Final   02/07/2023 10:57 AM 86 80 - 100 fL Final   04/18/2022 07:20 AM 89 80 - 100 fL Final     MCH   Date/Time Value Ref Range Status   03/15/2024 02:03 PM 26.7 26.0 - 34.0 pg Final   02/21/2024 10:48 AM 26.2 26.0 - 34.0 pg Final     MCHC   Date/Time Value Ref Range Status   03/15/2024 02:03 PM 31.2 (L) 32.0 - 36.0 g/dL Final   02/21/2024 10:48 AM 29.9 (L) 32.0 - 36.0 g/dL Final   02/17/2023 05:28 AM 29.6 (L) 32.0 - 36.0 g/dL Final   02/07/2023 10:57 AM 30.3 (L) 32.0 - 36.0 g/dL Final   04/18/2022 07:20 AM 30.1 (L) 32.0 - 36.0 g/dL Final     RDW   Date/Time Value Ref Range Status   03/15/2024 02:03 PM 14.4 11.5 - 14.5 % Final   02/21/2024 10:48 AM 14.0 11.5 - 14.5 % Final   02/17/2023 05:28 AM 13.5 11.5 - 14.5 % Final   02/07/2023 10:57 AM 13.8 11.5 - 14.5 % Final   04/18/2022 07:20 AM 13.4 11.5 - 14.5 % Final     Platelets   Date/Time Value Ref Range Status   03/15/2024 02:03  150 - 450 x10*3/uL Final   02/21/2024 10:48  150 - 450 x10*3/uL Final   02/17/2023 05:28  150 - 450  "x10E9/L Final   02/07/2023 10:57  150 - 450 x10E9/L Final   04/18/2022 07:20  150 - 450 x10E9/L Final     No results found for: \"MPV\"  Neutrophils %   Date/Time Value Ref Range Status   03/15/2024 02:03 PM 43.3 40.0 - 80.0 % Final   02/21/2024 10:48 AM 38.2 40.0 - 80.0 % Final   02/17/2023 05:28 AM 69.8 40.0 - 80.0 % Final   02/07/2023 10:57 AM 45.3 40.0 - 80.0 % Final   09/13/2021 09:17 AM 51.1 40.0 - 80.0 % Final     Immature Granulocytes %, Automated   Date/Time Value Ref Range Status   03/15/2024 02:03 PM 0.0 0.0 - 0.9 % Final     Comment:     Immature Granulocyte Count (IG) includes promyelocytes, myelocytes and metamyelocytes but does not include bands. Percent differential counts (%) should be interpreted in the context of the absolute cell counts (cells/UL).   02/21/2024 10:48 AM 0.3 0.0 - 0.9 % Final     Comment:     Immature Granulocyte Count (IG) includes promyelocytes, myelocytes and metamyelocytes but does not include bands. Percent differential counts (%) should be interpreted in the context of the absolute cell counts (cells/UL).   02/17/2023 05:28 AM 0.3 0.0 - 0.9 % Final     Comment:      Immature Granulocyte Count (IG) includes promyelocytes,    myelocytes and metamyelocytes but does not include bands.   Percent differential counts (%) should be interpreted in the   context of the absolute cell counts (cells/L).     02/07/2023 10:57 AM 0.2 0.0 - 0.9 % Final     Comment:      Immature Granulocyte Count (IG) includes promyelocytes,    myelocytes and metamyelocytes but does not include bands.   Percent differential counts (%) should be interpreted in the   context of the absolute cell counts (cells/L).     09/13/2021 09:17 AM 0.0 0.0 - 0.9 % Final     Comment:      Immature Granulocyte Count (IG) includes promyelocytes,    myelocytes and metamyelocytes but does not include bands.   Percent differential counts (%) should be interpreted in the   context of the absolute cell counts " (cells/L).       Lymphocytes %   Date/Time Value Ref Range Status   03/15/2024 02:03 PM 43.9 13.0 - 44.0 % Final   02/21/2024 10:48 AM 48.8 13.0 - 44.0 % Final   02/17/2023 05:28 AM 21.6 13.0 - 44.0 % Final   02/07/2023 10:57 AM 42.0 13.0 - 44.0 % Final   09/13/2021 09:17 AM 35.3 13.0 - 44.0 % Final     Monocytes %   Date/Time Value Ref Range Status   03/15/2024 02:03 PM 6.1 2.0 - 10.0 % Final   02/21/2024 10:48 AM 9.3 2.0 - 10.0 % Final   02/17/2023 05:28 AM 7.9 2.0 - 10.0 % Final   02/07/2023 10:57 AM 8.7 2.0 - 10.0 % Final   09/13/2021 09:17 AM 10.5 2.0 - 10.0 % Final     Eosinophils %   Date/Time Value Ref Range Status   03/15/2024 02:03 PM 6.3 0.0 - 6.0 % Final   02/21/2024 10:48 AM 3.1 0.0 - 6.0 % Final   02/17/2023 05:28 AM 0.3 0.0 - 6.0 % Final   02/07/2023 10:57 AM 3.0 0.0 - 6.0 % Final   09/13/2021 09:17 AM 2.7 0.0 - 6.0 % Final     Basophils %   Date/Time Value Ref Range Status   03/15/2024 02:03 PM 0.4 0.0 - 2.0 % Final   02/21/2024 10:48 AM 0.3 0.0 - 2.0 % Final   02/17/2023 05:28 AM 0.1 0.0 - 2.0 % Final   02/07/2023 10:57 AM 0.8 0.0 - 2.0 % Final   09/13/2021 09:17 AM 0.4 0.0 - 2.0 % Final     Neutrophils Absolute   Date/Time Value Ref Range Status   03/15/2024 02:03 PM 2.27 1.20 - 7.70 x10*3/uL Final     Comment:     Percent differential counts (%) should be interpreted in the context of the absolute cell counts (cells/uL).   02/21/2024 10:48 AM 1.24 1.20 - 7.70 x10*3/uL Final     Comment:     Percent differential counts (%) should be interpreted in the context of the absolute cell counts (cells/uL).   02/17/2023 05:28 AM 6.31 1.20 - 7.70 x10E9/L Final   02/07/2023 10:57 AM 2.41 1.20 - 7.70 x10E9/L Final   09/13/2021 09:17 AM 2.64 1.20 - 7.70 x10E9/L Final     Immature Granulocytes Absolute, Automated   Date/Time Value Ref Range Status   03/15/2024 02:03 PM 0.00 0.00 - 0.70 x10*3/uL Final   02/21/2024 10:48 AM 0.01 0.00 - 0.70 x10*3/uL Final     Lymphocytes Absolute   Date/Time Value Ref Range Status  "  03/15/2024 02:03 PM 2.30 1.20 - 4.80 x10*3/uL Final   02/21/2024 10:48 AM 1.58 1.20 - 4.80 x10*3/uL Final   02/17/2023 05:28 AM 1.95 1.20 - 4.80 x10E9/L Final   02/07/2023 10:57 AM 2.23 1.20 - 4.80 x10E9/L Final   09/13/2021 09:17 AM 1.82 1.20 - 4.80 x10E9/L Final     Monocytes Absolute   Date/Time Value Ref Range Status   03/15/2024 02:03 PM 0.32 0.10 - 1.00 x10*3/uL Final   02/21/2024 10:48 AM 0.30 0.10 - 1.00 x10*3/uL Final   02/17/2023 05:28 AM 0.71 0.10 - 1.00 x10E9/L Final   02/07/2023 10:57 AM 0.46 0.10 - 1.00 x10E9/L Final   09/13/2021 09:17 AM 0.54 0.10 - 1.00 x10E9/L Final     Eosinophils Absolute   Date/Time Value Ref Range Status   03/15/2024 02:03 PM 0.33 0.00 - 0.70 x10*3/uL Final   02/21/2024 10:48 AM 0.10 0.00 - 0.70 x10*3/uL Final   02/17/2023 05:28 AM 0.03 0.00 - 0.70 x10E9/L Final   02/07/2023 10:57 AM 0.16 0.00 - 0.70 x10E9/L Final   09/13/2021 09:17 AM 0.14 0.00 - 0.70 x10E9/L Final     Basophils Absolute   Date/Time Value Ref Range Status   03/15/2024 02:03 PM 0.02 0.00 - 0.10 x10*3/uL Final   02/21/2024 10:48 AM 0.01 0.00 - 0.10 x10*3/uL Final   02/17/2023 05:28 AM 0.01 0.00 - 0.10 x10E9/L Final   02/07/2023 10:57 AM 0.04 0.00 - 0.10 x10E9/L Final   09/13/2021 09:17 AM 0.02 0.00 - 0.10 x10E9/L Final       No components found for: \"PT\"  aPTT   Date/Time Value Ref Range Status   02/07/2023 10:57 AM 33 26 - 39 sec Final     Comment:       THE APTT IS NO LONGER USED FOR MONITORING     UNFRACTIONATED HEPARIN THERAPY.    FOR MONITORING HEPARIN THERAPY,     USE THE HEPARIN ASSAY.     02/22/2021 10:24 AM 25 25 - 35 sec Final     Comment:       THE APTT IS NO LONGER USED FOR MONITORING     UNFRACTIONATED HEPARIN THERAPY.    FOR MONITORING HEPARIN THERAPY,     USE THE HEPARIN ASSAY.     05/08/2020 08:33 AM 32 28 - 38 sec Final     Comment:       THE APTT IS NO LONGER USED FOR MONITORING     UNFRACTIONATED HEPARIN THERAPY.    FOR MONITORING HEPARIN THERAPY,     USE THE HEPARIN ASSAY.       Medication " Documentation Review Audit       Reviewed by IGNACIA Blum (Patient Care Technician) on 03/15/24 at 1338      Medication Order Taking? Sig Documenting Provider Last Dose Status   atorvastatin (Lipitor) 20 mg tablet [id number] Yes TAKE ONE TABLET BY MOUTH EVERY DAY Wyatt Peterson MD Taking Active   cetirizine (ZyrTEC) 10 mg tablet [id number] Yes Take 1 tablet (10 mg) by mouth once daily at bedtime. Historical Provider, MD Taking Active   diclofenac sodium 1 % kit [id number] Yes APPLY SPARINGLY TO AFFECTED AREA EVERY DAY Historical Provider, MD Taking Active   ferrous sulfate, 325 mg ferrous sulfate, (Iron, ferrous sulfate,) tablet [id number] Yes Take one capsule 5 times a week in empty stomach Wyatt Peterson MD Taking Active   lisinopriL-hydrochlorothiazide 20-12.5 mg tablet [id number] Yes TAKE ONE TABLET BY MOUTH EVERY DAY Wyatt Peterson MD Taking Active   traZODone (Desyrel) 50 mg tablet [id number] Yes TAKE ONE TABLET BY MOUTH AT BEDTIME AS NEEDED for sleep Wyatt Peterson MD Taking Active   venlafaxine XR (Effexor-XR) 37.5 mg 24 hr capsule [id number] Yes TAKE ONE CAPSULE BY MOUTH DAILY WITH FOOD Wyatt Peterson MD Taking Active                   Assessment/Plan    1) anemia  -I reviewed her lab history in the EMR  -1/20/2020 wbc 6.1, hgb 12, plt 344,000  -5/8/2020 wbc 5.3, hgb 12.2, plt 320,000  -5/19/2020 wbc 12, hgb 10.3, MCV 88, plt 245,000  -2/22/2021 wbc 5.1, hgb 12.2, plt 355,000  -3/9/2021 wbc 10.6, hgb 9.5, MCV 86, plt 285,000  -9/13/2021 wbc 5.2, hgb 11.4, plt 347,000  -4/18/2022 wbc 5.7, hgb 11.8, plt 325,000  -2/7/2023 wbc 5.3, hgb 11.8, plt 345,000  -2/17/2023 wbc 9.0, hgb 9.5, plt 279,000  -2/17/2024 wbc 7.9, hgb 10.2, MCV 85.3, plt 261,000  -2/21/2024 wbc 3.2, hgb 10.6, MCV 88, plt 294,000, creatinine 0.89, calcium 9.1, AST 33, ALT 20, TIBC 328, sat 8%, ferritin 529, hgb electrophoresis no evidence of hemoglobinopathy  -less than a month ago she started taking PO iron 5 days a week; she did not get  "the message from her PCP to stop taking PO iron because \"ferritin very high\" and she has continued to take iron without incident  -she clearly is iron deficient, but will make sure there are no other contributions to her anemia  -oral repletion of iron can take minimum 3-6 months  -CBC done on day of initial consultation showed wbc 5.2, hgb 11.2, plt 305,000 --hgb improving  -reviewed peripheral smear--other than microcytosis typical for iron deficiency, no wbc, plt or other RBC morphologic abnormalities seen  -no evidence of hemolysis  -elevated retic count is appropriate  -B12/folate normal  -advised her to continue with PO iron for now and we will recheck labs in a few months     2) anxiety  -on venlafaxine     3) hypertension  -on lisinopril-hydrochlorothiazide     4) hyperlipidemia  -on atorvastatin       Problem List Items Addressed This Visit             ICD-10-CM    Anemia - Primary D64.9    Relevant Orders    Clinic Appointment Request Follow Up; JANES TRINH    CBC and Auto Differential    Iron and TIBC    Ferritin            Janes Trinh MD                         "

## 2024-04-21 DIAGNOSIS — E78.00 HYPERCHOLESTEROLEMIA: ICD-10-CM

## 2024-04-22 RX ORDER — ATORVASTATIN CALCIUM 20 MG/1
20 TABLET, FILM COATED ORAL DAILY
Qty: 90 TABLET | Refills: 0 | Status: SHIPPED | OUTPATIENT
Start: 2024-04-22

## 2024-04-29 ENCOUNTER — HOSPITAL ENCOUNTER (OUTPATIENT)
Age: 64
Discharge: HOME | End: 2024-05-01
Attending: FAMILY MEDICINE
Payer: MEDICARE

## 2024-04-29 ENCOUNTER — HOSPITAL ENCOUNTER (OUTPATIENT)
Dept: ULTRASOUND IMAGING | Age: 64
Discharge: HOME | End: 2024-05-01
Attending: FAMILY MEDICINE
Payer: MEDICARE

## 2024-04-29 VITALS
HEIGHT: 57 IN | DIASTOLIC BLOOD PRESSURE: 70 MMHG | WEIGHT: 160 LBS | BODY MASS INDEX: 34.52 KG/M2 | SYSTOLIC BLOOD PRESSURE: 112 MMHG

## 2024-04-29 DIAGNOSIS — R01.1 HEART MURMUR: ICD-10-CM

## 2024-04-29 DIAGNOSIS — R09.89 LEFT CAROTID BRUIT: ICD-10-CM

## 2024-04-29 LAB
ECHO AO ROOT DIAM: 1.9 CM
ECHO AO ROOT INDEX: 1.16 CM/M2
ECHO AV AREA PEAK VELOCITY: 2.4 CM2
ECHO AV AREA VTI: 2.8 CM2
ECHO AV AREA/BSA PEAK VELOCITY: 1.5 CM2/M2
ECHO AV AREA/BSA VTI: 1.7 CM2/M2
ECHO AV MEAN GRADIENT: 4 MMHG
ECHO AV MEAN VELOCITY: 1 M/S
ECHO AV PEAK GRADIENT: 8 MMHG
ECHO AV PEAK VELOCITY: 1.4 M/S
ECHO AV VELOCITY RATIO: 0.79
ECHO AV VTI: 25.6 CM
ECHO BSA: 1.71 M2
ECHO EST RA PRESSURE: 3 MMHG
ECHO LA DIAMETER INDEX: 1.71 CM/M2
ECHO LA DIAMETER: 2.8 CM
ECHO LA TO AORTIC ROOT RATIO: 1.47
ECHO LA VOL A-L A2C: 15 ML (ref 22–52)
ECHO LA VOL A-L A4C: 18 ML (ref 22–52)
ECHO LA VOL MOD A2C: 13 ML (ref 22–52)
ECHO LA VOL MOD A4C: 16 ML (ref 22–52)
ECHO LA VOLUME AREA LENGTH: 17 ML
ECHO LA VOLUME INDEX A-L A2C: 9 ML/M2 (ref 16–34)
ECHO LA VOLUME INDEX A-L A4C: 11 ML/M2 (ref 16–34)
ECHO LA VOLUME INDEX AREA LENGTH: 10 ML/M2 (ref 16–34)
ECHO LA VOLUME INDEX MOD A2C: 8 ML/M2 (ref 16–34)
ECHO LA VOLUME INDEX MOD A4C: 10 ML/M2 (ref 16–34)
ECHO LV E' LATERAL VELOCITY: 8 CM/S
ECHO LV E' SEPTAL VELOCITY: 8 CM/S
ECHO LV EDV A2C: 34 ML
ECHO LV EDV A4C: 56 ML
ECHO LV EDV BP: 43 ML (ref 56–104)
ECHO LV EDV INDEX A4C: 34 ML/M2
ECHO LV EDV INDEX BP: 26 ML/M2
ECHO LV EDV NDEX A2C: 21 ML/M2
ECHO LV EJECTION FRACTION A2C: 50 %
ECHO LV EJECTION FRACTION A4C: 66 %
ECHO LV EJECTION FRACTION BIPLANE: 60 % (ref 55–100)
ECHO LV ESV A2C: 17 ML
ECHO LV ESV A4C: 19 ML
ECHO LV ESV BP: 17 ML (ref 19–49)
ECHO LV ESV INDEX A2C: 10 ML/M2
ECHO LV ESV INDEX A4C: 12 ML/M2
ECHO LV ESV INDEX BP: 10 ML/M2
ECHO LV FRACTIONAL SHORTENING: 29 % (ref 28–44)
ECHO LV INTERNAL DIMENSION DIASTOLE INDEX: 2.13 CM/M2
ECHO LV INTERNAL DIMENSION DIASTOLIC: 3.5 CM (ref 3.9–5.3)
ECHO LV INTERNAL DIMENSION SYSTOLIC INDEX: 1.52 CM/M2
ECHO LV INTERNAL DIMENSION SYSTOLIC: 2.5 CM
ECHO LV IVSD: 1.1 CM (ref 0.6–0.9)
ECHO LV IVSS: 1.6 CM
ECHO LV MASS 2D: 119 G (ref 67–162)
ECHO LV MASS INDEX 2D: 72.6 G/M2 (ref 43–95)
ECHO LV POSTERIOR WALL DIASTOLIC: 1.1 CM (ref 0.6–0.9)
ECHO LV POSTERIOR WALL SYSTOLIC: 1 CM
ECHO LV RELATIVE WALL THICKNESS RATIO: 0.63
ECHO LVOT AREA: 3.1 CM2
ECHO LVOT AV VTI INDEX: 0.89
ECHO LVOT DIAM: 2 CM
ECHO LVOT MEAN GRADIENT: 3 MMHG
ECHO LVOT PEAK GRADIENT: 5 MMHG
ECHO LVOT PEAK VELOCITY: 1.1 M/S
ECHO LVOT STROKE VOLUME INDEX: 43.7 ML/M2
ECHO LVOT SV: 71.6 ML
ECHO LVOT VTI: 22.8 CM
ECHO MV A VELOCITY: 0.76 M/S
ECHO MV AREA PHT: 4.5 CM2
ECHO MV E DECELERATION TIME (DT): 226.6 MS
ECHO MV E VELOCITY: 0.59 M/S
ECHO MV E/A RATIO: 0.78
ECHO MV E/E' LATERAL: 7.38
ECHO MV E/E' RATIO (AVERAGED): 7.38
ECHO MV PRESSURE HALF TIME (PHT): 48.7 MS
ECHO PV MAX VELOCITY: 0.6 M/S
ECHO PV PEAK GRADIENT: 1 MMHG
ECHO RIGHT VENTRICULAR SYSTOLIC PRESSURE (RVSP): 19 MMHG
ECHO RV INTERNAL DIMENSION: 1.8 CM
ECHO RV TAPSE: 2.1 CM (ref 1.7–?)
ECHO TV REGURGITANT MAX VELOCITY: 2.02 M/S
ECHO TV REGURGITANT PEAK GRADIENT: 16 MMHG

## 2024-04-29 PROCEDURE — 93306 TTE W/DOPPLER COMPLETE: CPT

## 2024-04-29 PROCEDURE — 93306 TTE W/DOPPLER COMPLETE: CPT | Performed by: INTERNAL MEDICINE

## 2024-04-29 PROCEDURE — 93880 EXTRACRANIAL BILAT STUDY: CPT

## 2024-05-08 RX ORDER — POLYETHYLENE GLYCOL 3350, SODIUM CHLORIDE, SODIUM BICARBONATE, POTASSIUM CHLORIDE 420; 11.2; 5.72; 1.48 G/4L; G/4L; G/4L; G/4L
4000 POWDER, FOR SOLUTION ORAL ONCE
Qty: 4000 ML | Refills: 0 | Status: SHIPPED | OUTPATIENT
Start: 2024-05-08 | End: 2024-05-08

## 2024-05-10 ENCOUNTER — OFFICE VISIT (OUTPATIENT)
Dept: FAMILY MEDICINE CLINIC | Age: 64
End: 2024-05-10
Payer: MEDICARE

## 2024-05-10 VITALS
TEMPERATURE: 97.3 F | SYSTOLIC BLOOD PRESSURE: 128 MMHG | OXYGEN SATURATION: 98 % | WEIGHT: 165.8 LBS | HEART RATE: 76 BPM | HEIGHT: 57 IN | BODY MASS INDEX: 35.77 KG/M2 | DIASTOLIC BLOOD PRESSURE: 74 MMHG

## 2024-05-10 DIAGNOSIS — F41.9 ANXIETY: ICD-10-CM

## 2024-05-10 DIAGNOSIS — Z00.00 WELCOME TO MEDICARE PREVENTIVE VISIT: Primary | ICD-10-CM

## 2024-05-10 PROCEDURE — 1123F ACP DISCUSS/DSCN MKR DOCD: CPT | Performed by: FAMILY MEDICINE

## 2024-05-10 PROCEDURE — G0402 INITIAL PREVENTIVE EXAM: HCPCS | Performed by: FAMILY MEDICINE

## 2024-05-10 ASSESSMENT — PATIENT HEALTH QUESTIONNAIRE - PHQ9
8. MOVING OR SPEAKING SO SLOWLY THAT OTHER PEOPLE COULD HAVE NOTICED. OR THE OPPOSITE, BEING SO FIGETY OR RESTLESS THAT YOU HAVE BEEN MOVING AROUND A LOT MORE THAN USUAL: NOT AT ALL
SUM OF ALL RESPONSES TO PHQ QUESTIONS 1-9: 0
10. IF YOU CHECKED OFF ANY PROBLEMS, HOW DIFFICULT HAVE THESE PROBLEMS MADE IT FOR YOU TO DO YOUR WORK, TAKE CARE OF THINGS AT HOME, OR GET ALONG WITH OTHER PEOPLE: NOT DIFFICULT AT ALL
SUM OF ALL RESPONSES TO PHQ QUESTIONS 1-9: 0
5. POOR APPETITE OR OVEREATING: NOT AT ALL
2. FEELING DOWN, DEPRESSED OR HOPELESS: NOT AT ALL
SUM OF ALL RESPONSES TO PHQ QUESTIONS 1-9: 0
3. TROUBLE FALLING OR STAYING ASLEEP: NOT AT ALL
9. THOUGHTS THAT YOU WOULD BE BETTER OFF DEAD, OR OF HURTING YOURSELF: NOT AT ALL
1. LITTLE INTEREST OR PLEASURE IN DOING THINGS: NOT AT ALL
4. FEELING TIRED OR HAVING LITTLE ENERGY: NOT AT ALL
7. TROUBLE CONCENTRATING ON THINGS, SUCH AS READING THE NEWSPAPER OR WATCHING TELEVISION: NOT AT ALL
6. FEELING BAD ABOUT YOURSELF - OR THAT YOU ARE A FAILURE OR HAVE LET YOURSELF OR YOUR FAMILY DOWN: NOT AT ALL
SUM OF ALL RESPONSES TO PHQ QUESTIONS 1-9: 0
SUM OF ALL RESPONSES TO PHQ9 QUESTIONS 1 & 2: 0

## 2024-05-10 ASSESSMENT — LIFESTYLE VARIABLES
HOW MANY STANDARD DRINKS CONTAINING ALCOHOL DO YOU HAVE ON A TYPICAL DAY: 1 OR 2
HOW OFTEN DO YOU HAVE A DRINK CONTAINING ALCOHOL: MONTHLY OR LESS

## 2024-05-10 NOTE — PROGRESS NOTES
non-tender, non-distended, normal bowel sounds, no masses or organomegaly  Extremities: no cyanosis, clubbing or edema  Musculoskeletal: normal range of motion, no joint swelling, deformity or tenderness  Neurologic: reflexes normal and symmetric, no cranial nerve deficit, gait, coordination and speech normal       No Known Allergies  Prior to Visit Medications    Medication Sig Taking? Authorizing Provider   Apoaequorin (PREVAGEN) 10 MG CAPS Take by mouth  Robert Pickett MD   ferrous sulfate (IRON 325) 325 (65 Fe) MG tablet Take 1 tablet by mouth daily (with breakfast)  Robert Pickett MD   ondansetron (ZOFRAN-ODT) 4 MG disintegrating tablet Take 1 tablet by mouth 3 times daily as needed for Nausea or Vomiting  Daren Navarro MD   atorvastatin (LIPITOR) 20 MG tablet TAKE ONE TABLET BY MOUTH EVERY DAY  Robert Pickett MD   lisinopril-hydrochlorothiazide (PRINZIDE;ZESTORETIC) 20-12.5 MG per tablet Take 1 tablet by mouth  Robert Pickett MD       CareTeam (Including outside providers/suppliers regularly involved in providing care):   Patient Care Team:  Curtis Kee MD as PCP - General (Family Medicine)  Curtis Kee MD as PCP - Empaneled Provider  Rachael Felix APRN - CNP (Nurse Practitioner)  Marianela Loco MD (Obstetrics & Gynecology)     Reviewed and updated this visit:       Ezio DENT MA, 5/10/2024, performed the documented evaluation under the direct supervision of the attending physician.

## 2024-05-13 NOTE — TELEPHONE ENCOUNTER
Patient called resulted in Mammogram scheduled for 10/30/2023 @ 9:40 am Molly Silver Please call pt and inform her of results. A1C has increased from 5.7 to 6. Still consistent with prediabetes. Limit intake of high sugar and high carb foods to prevent progression to diabetes. We can offer pt a referral to Nutrition counseling. F/u in November as planned with repeat fasting labs. Future labs ordered.

## 2024-05-14 ENCOUNTER — OFFICE VISIT (OUTPATIENT)
Dept: CARDIOLOGY CLINIC | Age: 64
End: 2024-05-14
Payer: MEDICARE

## 2024-05-14 VITALS
WEIGHT: 163.2 LBS | HEIGHT: 57 IN | DIASTOLIC BLOOD PRESSURE: 82 MMHG | BODY MASS INDEX: 35.21 KG/M2 | SYSTOLIC BLOOD PRESSURE: 126 MMHG | HEART RATE: 102 BPM | OXYGEN SATURATION: 96 % | RESPIRATION RATE: 14 BRPM

## 2024-05-14 DIAGNOSIS — R07.9 CHEST PAIN, UNSPECIFIED TYPE: ICD-10-CM

## 2024-05-14 DIAGNOSIS — R06.02 SHORTNESS OF BREATH: ICD-10-CM

## 2024-05-14 DIAGNOSIS — R01.1 HEART MURMUR: Primary | ICD-10-CM

## 2024-05-14 PROCEDURE — 93000 ELECTROCARDIOGRAM COMPLETE: CPT | Performed by: INTERNAL MEDICINE

## 2024-05-14 PROCEDURE — 99214 OFFICE O/P EST MOD 30 MIN: CPT | Performed by: INTERNAL MEDICINE

## 2024-05-14 PROCEDURE — 1123F ACP DISCUSS/DSCN MKR DOCD: CPT | Performed by: INTERNAL MEDICINE

## 2024-05-14 NOTE — PROGRESS NOTES
2024    Curtis Kee MD  [address]  [place] [place] [address]    RE: Norma Carrizales   : 1960     Dear Curtis Blake MD :    I had the pleasure of seeing your patient, Norma Carrizales in the office today on 2024.    As you are well aware, Ms. Carrizales is a pleasant 64 y.o.  female who was kindly referred to me for evaluation of heart murmur and chest pain.  She was noted to have murmur on auscultation and recent PCP visit.  She denies any prior history of murmur or valvular heart disease.  Currently, she reports dyspnea with more physical degrees of activity worsening over the last year.  She has to stop to catch her breath more frequently.  She reports associated midsternal left-sided chest \"pressure\" that sometimes radiates to her back and down the left shoulder.  The pain is rated 6 out of 10 at maximum.  It typically resolves within 15 to 20 minutes of rest.  No palpitations, near-syncope, or syncope.  No nausea, vomiting, or diaphoresis.  She denies any prior history of heart disease.  She did have a recent echocardiogram showing preserved LV function and no significant valve disease.  She had a calcium scoring done earlier this year but continues to have ongoing symptoms.    Past Medical History: She  has a past medical history of Hypertension.     Surgical History: She  has a past surgical history that includes Knee arthroscopy (Left, 2018); Total knee arthroplasty (Left, 2021); and Total knee arthroplasty (Right, 2023).      Social History: She  reports that she has never smoked. She has never used smokeless tobacco. She reports that she does not drink alcohol and does not use drugs.     Family History: family history includes Diabetes in her mother.    Current medications:   Current Outpatient Medications   Medication Sig Dispense Refill    sertraline (ZOLOFT) 50 MG tablet Take 0.5 tablet PO Qdaily x 1 week, then 1 tab PO Qdaily 30 tablet 0

## 2024-05-17 ENCOUNTER — PREP FOR PROCEDURE (OUTPATIENT)
Dept: GASTROENTEROLOGY | Age: 64
End: 2024-05-17

## 2024-05-17 RX ORDER — SODIUM CHLORIDE 0.9 % (FLUSH) 0.9 %
5-40 SYRINGE (ML) INJECTION PRN
Status: CANCELLED | OUTPATIENT
Start: 2024-05-17

## 2024-05-17 RX ORDER — SODIUM CHLORIDE 9 MG/ML
INJECTION, SOLUTION INTRAVENOUS PRN
Status: CANCELLED | OUTPATIENT
Start: 2024-05-17

## 2024-05-17 RX ORDER — SODIUM CHLORIDE 0.9 % (FLUSH) 0.9 %
5-40 SYRINGE (ML) INJECTION EVERY 12 HOURS SCHEDULED
Status: CANCELLED | OUTPATIENT
Start: 2024-05-17

## 2024-05-17 RX ORDER — SODIUM CHLORIDE 9 MG/ML
INJECTION, SOLUTION INTRAVENOUS CONTINUOUS
Status: CANCELLED | OUTPATIENT
Start: 2024-05-17

## 2024-06-03 DIAGNOSIS — F41.9 ANXIETY: ICD-10-CM

## 2024-06-03 NOTE — TELEPHONE ENCOUNTER
Pharmacy is requesting medication refill. Please approve or deny this request.    Rx requested:  Requested Prescriptions     Pending Prescriptions Disp Refills    sertraline (ZOLOFT) 50 MG tablet [Pharmacy Med Name: Sertraline HCl Oral Tablet 50 MG] 30 tablet 0     Sig: TAKE ONE-HALF TABLET BY MOUTH ONCE DAILY FOR ONE WEEK, THEN TAKE ONE TABLET BY MOUTH DAILY.         Last Office Visit:   5/10/2024      Next Visit Date:  Future Appointments   Date Time Provider Department Center   6/18/2024  8:40 AM Lorie Granado DO [facility]   6/27/2024 10:00 AM [facility] 2 [facility]   6/27/2024 11:00 AM [facility] 3 [facility]   6/27/2024 11:30 AM [facility] 1 [facility]   6/27/2024  1:00 PM [facility] 3 [facility]

## 2024-06-17 DIAGNOSIS — F41.9 ANXIETY DISORDER, UNSPECIFIED TYPE: ICD-10-CM

## 2024-06-17 RX ORDER — VENLAFAXINE HYDROCHLORIDE 37.5 MG/1
37.5 CAPSULE, EXTENDED RELEASE ORAL
Qty: 90 CAPSULE | Refills: 1 | Status: SHIPPED | OUTPATIENT
Start: 2024-06-17

## 2024-06-20 ENCOUNTER — OFFICE VISIT (OUTPATIENT)
Dept: PRIMARY CARE | Facility: CLINIC | Age: 64
End: 2024-06-20
Payer: MEDICARE

## 2024-06-20 ENCOUNTER — LAB (OUTPATIENT)
Dept: LAB | Facility: LAB | Age: 64
End: 2024-06-20
Payer: MEDICARE

## 2024-06-20 ENCOUNTER — APPOINTMENT (OUTPATIENT)
Dept: PRIMARY CARE | Facility: CLINIC | Age: 64
End: 2024-06-20
Payer: MEDICARE

## 2024-06-20 VITALS
WEIGHT: 160 LBS | BODY MASS INDEX: 32.25 KG/M2 | TEMPERATURE: 96 F | HEART RATE: 80 BPM | SYSTOLIC BLOOD PRESSURE: 116 MMHG | DIASTOLIC BLOOD PRESSURE: 70 MMHG | HEIGHT: 59 IN

## 2024-06-20 DIAGNOSIS — E66.09 CLASS 1 OBESITY DUE TO EXCESS CALORIES WITH SERIOUS COMORBIDITY AND BODY MASS INDEX (BMI) OF 32.0 TO 32.9 IN ADULT: ICD-10-CM

## 2024-06-20 DIAGNOSIS — Z79.899 DRUG THERAPY: ICD-10-CM

## 2024-06-20 DIAGNOSIS — F41.9 ANXIETY DISORDER, UNSPECIFIED TYPE: ICD-10-CM

## 2024-06-20 DIAGNOSIS — Z12.11 COLON CANCER SCREENING: ICD-10-CM

## 2024-06-20 DIAGNOSIS — I10 BENIGN ESSENTIAL HYPERTENSION: Primary | ICD-10-CM

## 2024-06-20 DIAGNOSIS — E78.00 HYPERCHOLESTEROLEMIA: ICD-10-CM

## 2024-06-20 DIAGNOSIS — D64.9 ANEMIA, UNSPECIFIED TYPE: ICD-10-CM

## 2024-06-20 LAB
AMPHETAMINES UR QL SCN: NORMAL
BARBITURATES UR QL SCN: NORMAL
BENZODIAZ UR QL SCN: NORMAL
BZE UR QL SCN: NORMAL
CANNABINOIDS UR QL SCN: NORMAL
FENTANYL+NORFENTANYL UR QL SCN: NORMAL
METHADONE UR QL SCN: NORMAL
OPIATES UR QL SCN: NORMAL
OXYCODONE+OXYMORPHONE UR QL SCN: NORMAL
PCP UR QL SCN: NORMAL

## 2024-06-20 PROCEDURE — 3074F SYST BP LT 130 MM HG: CPT | Performed by: INTERNAL MEDICINE

## 2024-06-20 PROCEDURE — 3078F DIAST BP <80 MM HG: CPT | Performed by: INTERNAL MEDICINE

## 2024-06-20 PROCEDURE — 1036F TOBACCO NON-USER: CPT | Performed by: INTERNAL MEDICINE

## 2024-06-20 PROCEDURE — 80307 DRUG TEST PRSMV CHEM ANLYZR: CPT

## 2024-06-20 PROCEDURE — 1160F RVW MEDS BY RX/DR IN RCRD: CPT | Performed by: INTERNAL MEDICINE

## 2024-06-20 PROCEDURE — 1159F MED LIST DOCD IN RCRD: CPT | Performed by: INTERNAL MEDICINE

## 2024-06-20 PROCEDURE — 3008F BODY MASS INDEX DOCD: CPT | Performed by: INTERNAL MEDICINE

## 2024-06-20 PROCEDURE — 99214 OFFICE O/P EST MOD 30 MIN: CPT | Performed by: INTERNAL MEDICINE

## 2024-06-20 PROCEDURE — 1123F ACP DISCUSS/DSCN MKR DOCD: CPT | Performed by: INTERNAL MEDICINE

## 2024-06-20 RX ORDER — PHENTERMINE HYDROCHLORIDE 37.5 MG/1
37.5 TABLET ORAL
Qty: 30 TABLET | Refills: 1 | Status: SHIPPED | OUTPATIENT
Start: 2024-06-20 | End: 2024-08-19

## 2024-06-20 ASSESSMENT — ENCOUNTER SYMPTOMS
ARTHRALGIAS: 1
GASTROINTESTINAL NEGATIVE: 1
RESPIRATORY NEGATIVE: 1
SLEEP DISTURBANCE: 0
NERVOUS/ANXIOUS: 1
BACK PAIN: 0
CONSTITUTIONAL NEGATIVE: 1
CARDIOVASCULAR NEGATIVE: 1
WEAKNESS: 0

## 2024-06-20 NOTE — PROGRESS NOTES
Subjective   Patient ID: Maryan Carey is a 64 y.o. female who presents for Follow-up.  HPI  Hyperlipidemia  This is a chronic problem. The current episode started more than 1 year ago. The problem is controlled. Recent lipid tests were reviewed and are normal. Exacerbating diseases include obesity. She has no history of chronic renal disease or diabetes. Pertinent negatives include no chest pain. Current antihyperlipidemic treatment includes statins. The current treatment provides significant improvement of lipids. Risk factors for coronary artery disease include dyslipidemia, obesity and post-menopausal.    OARRS:  No data recorded  I have personally reviewed the OARRS report for Maryan Carey. I have considered the risks of abuse, dependence, addiction and diversion    Is the patient prescribed a combination of a benzodiazepine and opioid?  No    Last Urine Drug Screen / ordered today: Yes  No results found for this or any previous visit (from the past 8760 hour(s)).  N/A    Clinical rationale for not completing a Urine Drug Screen: ordered today      Controlled Substance Agreement:  Date of the Last Agreement: 6/20/2024  Reviewed Controlled Substance Agreement including but not limited to the benefits, risks, and alternatives to treatment with a Controlled Substance medication(s).    Anorexiants:   What is the patient's goal of therapy? Wt loss  Is this being achieved with current treatment? started    I have assessed the patient's continuing efforts to lose weight., I have assessed the patient's dedication to the treatment program and the response to treatment., and I have assessed the presence or absence of contraindications, adverse effects, and indicators of possible substance abuse that would necessitate cessation of treatment utilizing controlled substance.    I have assessed for the presence or absence of contraindications, adverse effects, and indicators of possible substance abuse that would  necessitate cessation of treatment utilizing controlled substance.    Activities of Daily Living:   Is your overall impression that this patient is benefiting (symptom reduction outweighs side effects) from anorexiants therapy? Yes     1. Physical Functioning: Same  2. Family Relationship: Same  3. Social Relationship: Same  4. Mood: Worse  5. Sleep Patterns: Worse  6. Overall Function: Same    Hypertension  This is a chronic problem. The current episode started more than 1 year ago. The problem has been resolved since onset. The problem is controlled. Associated symptoms include anxiety. Pertinent negatives include no blurred vision, chest pain, peripheral edema or shortness of breath. Risk factors for coronary artery disease include obesity and post-menopausal state. Past treatments include ACE inhibitors and diuretics. The current treatment provides moderate improvement. There are no compliance problems.  There is no history of angina or kidney disease. There is no history of chronic renal disease. Has echo and carotid studies done.  Anxiety  Presents for follow-up visit. Patient reports no chest pain, compulsions, confusion, decreased concentration, depressed mood, dizziness, feeling of choking, hyperventilation, restlessness or shortness of breath. The quality of sleep is restorative. Remains on SNRI.  Past Medical History  History reviewed. No pertinent past medical history.    Social History  Social History     Tobacco Use    Smoking status: Never    Smokeless tobacco: Never   Vaping Use    Vaping status: Never Used   Substance Use Topics    Alcohol use: Not Currently    Drug use: Never       Family History     Family History   Problem Relation Name Age of Onset    Dementia Mother      Hypertension Mother      Lung cancer Father          Mesothelioma of lung       Allergies:  Allergies   Allergen Reactions    Other Other        Outpatient Medications:  Current Outpatient Medications   Medication Instructions  "   atorvastatin (LIPITOR) 20 mg, oral, Daily    cetirizine (ZyrTEC) 10 mg tablet 1 tablet, oral, Nightly    diclofenac sodium 1 % kit APPLY SPARINGLY TO AFFECTED AREA EVERY DAY    ferrous sulfate, 325 mg ferrous sulfate, (Iron, ferrous sulfate,) tablet Take one capsule 5 times a week in empty stomach    lisinopriL-hydrochlorothiazide 20-12.5 mg tablet 1 tablet, oral, Daily    traZODone (DESYREL) 50 mg, oral, Nightly PRN    venlafaxine XR (EFFEXOR-XR) 37.5 mg, oral, Daily with breakfast        Review of Systems   Constitutional: Negative.    Respiratory: Negative.     Cardiovascular: Negative.    Gastrointestinal: Negative.    Musculoskeletal:  Positive for arthralgias. Negative for back pain.   Skin: Negative.    Neurological:  Negative for weakness.   Psychiatric/Behavioral:  Negative for sleep disturbance. The patient is nervous/anxious.          Objective       Physical Exam  Vitals reviewed.   Constitutional:       Appearance: Normal appearance. She is obese.   HENT:      Head: Normocephalic and atraumatic.   Eyes:      Conjunctiva/sclera: Conjunctivae normal.   Cardiovascular:      Rate and Rhythm: Normal rate and regular rhythm.      Pulses: Normal pulses.   Pulmonary:      Effort: Pulmonary effort is normal.      Breath sounds: Normal breath sounds.   Abdominal:      Palpations: Abdomen is soft.   Musculoskeletal:         General: No tenderness.      Cervical back: Neck supple.   Skin:     General: Skin is warm and dry.   Neurological:      General: No focal deficit present.   Psychiatric:         Mood and Affect: Mood normal.     /70 (BP Location: Right arm, Patient Position: Sitting, BP Cuff Size: Adult)   Pulse 80   Temp 35.6 °C (96 °F) (Temporal)   Ht 1.499 m (4' 11\")   Wt 72.6 kg (160 lb)   BMI 32.32 kg/m²      Assessment/Plan   Problem List Items Addressed This Visit       Anxiety disorder    Benign essential hypertension - Primary    Hypercholesterolemia    Anemia     Other Visit Diagnoses  "      Colon cancer screening        Class 1 obesity due to excess calories with serious comorbidity and body mass index (BMI) of 32.0 to 32.9 in adult            OARRS reviewed, UDS has been done on annual basis. Rx patten and usage reviewed, adverse reactions to controlled substance and risk of habituality and addiction and overdose and death explained, pt has been explained about [facility] controlled substance policy and mandates and follow ups. Controlled substance agreement has been reviewed and renewed annually. Please keep bottles containing controlled substance medications away from children and in safe area, do not share this Rx to anyone, noncompliance will lead to cessation of therapy. So far patient has been doing well and shows compliance.  I spent <15 minutes face to face with individual providing recommendations for nutrition choices and exercise plan to help achieve weight reduction goals. Obesity is systemic disorder and it can bring devastating morbidities in furture. It is a matter of calorie gain and loss, keeping bodybank in negative calorie balance mode is the way to sustain weight loss.Diet has a big role in reducing excess body wt. Scheduled and well planned meals and food intake with watchfulness and understanding of calorie portion and distribution is key to understand. Bariatric surgery is another option if sustained wt loss is not achieved and faced with one or more comorbidities with morbid obesity. Weigh yourself twice a week to understand and follow wt loss goals.  As I was assessing this patient she asked me that can she go to the weight loss physician I asked why she says that she is looking to get Adipex, she has a BMI of 32, she never took Adipex before, I went to the side effects of Adipex I told him that it is not the one of the straightforward medication we can take and she needs to be aware about the adverse effects and it can be given only for 3 months.  We expect 5%  weight loss with 3 months of therapy of Adipex she understands the side effects and she is willing to take Adipex for 3 months, OARRS report was reviewed and urine drug screening will be done.  Controlled substance agreement was provided with 6 weeks follow-up.  BP readings are stable, patient was seen by another physician in the [facility], they heard a bruit in the carotid I do not hear, they heard a murmur on cardiovascular exam I do not hear.  Underwent echo it was normal carotids were normal coronary calcium score was 0, she was informed about all this test, she remains on statins, anxiety has been indolent, I told her to not to use trazodone while on Adipex.  She needs colon cancer screening it is not done yet, anemia remains, hematology did not come out with any definitive diagnosis.  Nutritional supplements has been given, class I obesity remains and Adipex has been given, went through all the report and information, 6 weeks follow-up was suggested.

## 2024-07-16 ENCOUNTER — OFFICE VISIT (OUTPATIENT)
Dept: PRIMARY CARE | Facility: CLINIC | Age: 64
End: 2024-07-16
Payer: MEDICARE

## 2024-07-16 VITALS
SYSTOLIC BLOOD PRESSURE: 120 MMHG | HEART RATE: 80 BPM | TEMPERATURE: 96.8 F | WEIGHT: 159 LBS | BODY MASS INDEX: 32.05 KG/M2 | DIASTOLIC BLOOD PRESSURE: 78 MMHG | HEIGHT: 59 IN

## 2024-07-16 DIAGNOSIS — R21 RASH OF FACE: Primary | ICD-10-CM

## 2024-07-16 PROCEDURE — 3078F DIAST BP <80 MM HG: CPT | Performed by: INTERNAL MEDICINE

## 2024-07-16 PROCEDURE — 1036F TOBACCO NON-USER: CPT | Performed by: INTERNAL MEDICINE

## 2024-07-16 PROCEDURE — 3074F SYST BP LT 130 MM HG: CPT | Performed by: INTERNAL MEDICINE

## 2024-07-16 PROCEDURE — 99212 OFFICE O/P EST SF 10 MIN: CPT | Performed by: INTERNAL MEDICINE

## 2024-07-16 PROCEDURE — 1160F RVW MEDS BY RX/DR IN RCRD: CPT | Performed by: INTERNAL MEDICINE

## 2024-07-16 PROCEDURE — 1159F MED LIST DOCD IN RCRD: CPT | Performed by: INTERNAL MEDICINE

## 2024-07-16 PROCEDURE — 3008F BODY MASS INDEX DOCD: CPT | Performed by: INTERNAL MEDICINE

## 2024-07-16 PROCEDURE — 1123F ACP DISCUSS/DSCN MKR DOCD: CPT | Performed by: INTERNAL MEDICINE

## 2024-07-16 RX ORDER — CLOTRIMAZOLE 1 %
CREAM (GRAM) TOPICAL 2 TIMES DAILY
Qty: 14 G | Refills: 0 | Status: SHIPPED | OUTPATIENT
Start: 2024-07-16 | End: 2024-11-13

## 2024-07-16 RX ORDER — HYDROCORTISONE 25 MG/G
CREAM TOPICAL 2 TIMES DAILY PRN
Qty: 20 G | Refills: 0 | Status: SHIPPED | OUTPATIENT
Start: 2024-07-16 | End: 2025-07-16

## 2024-07-16 ASSESSMENT — ENCOUNTER SYMPTOMS
RHINORRHEA: 0
CARDIOVASCULAR NEGATIVE: 1
SHORTNESS OF BREATH: 0
MUSCULOSKELETAL NEGATIVE: 1
DIZZINESS: 0
DIARRHEA: 0
FEVER: 0
FATIGUE: 0
COUGH: 0
RESPIRATORY NEGATIVE: 1
GASTROINTESTINAL NEGATIVE: 1

## 2024-07-16 NOTE — PROGRESS NOTES
"Subjective   Patient ID: Maryan Carey is a 64 y.o. female who presents for Rash (Rash on face x 1 week).    Rash  This is a new problem. The current episode started 1 to 4 weeks ago. The problem is unchanged. The affected locations include the face. The rash is characterized by itchiness and redness. She was exposed to nothing. Pertinent negatives include no cough, diarrhea, fatigue, fever, rhinorrhea or shortness of breath. Past treatments include nothing. The treatment provided no relief. There is no history of allergies or eczema.        Review of Systems   Constitutional:  Negative for fatigue and fever.   HENT:  Negative for rhinorrhea.    Respiratory: Negative.  Negative for cough and shortness of breath.    Cardiovascular: Negative.    Gastrointestinal: Negative.  Negative for diarrhea.   Musculoskeletal: Negative.    Skin:  Positive for rash.   Neurological:  Negative for dizziness.       Objective   Temp 36 °C (96.8 °F) (Temporal)   Ht 1.499 m (4' 11\")   Wt 72.1 kg (159 lb)   BMI 32.11 kg/m²     Physical Exam  Vitals reviewed.   Constitutional:       Appearance: Normal appearance. She is normal weight.   HENT:      Head: Normocephalic.   Eyes:      Conjunctiva/sclera: Conjunctivae normal.   Cardiovascular:      Rate and Rhythm: Normal rate and regular rhythm.   Pulmonary:      Effort: Pulmonary effort is normal.      Breath sounds: Normal breath sounds.   Abdominal:      Palpations: Abdomen is soft.   Musculoskeletal:         General: No tenderness.      Cervical back: Neck supple.   Skin:     General: Skin is warm and dry.      Findings: Rash present. Rash is macular and scaling.          Neurological:      General: No focal deficit present.   Psychiatric:         Mood and Affect: Mood normal.       Assessment/Plan   Problem List Items Addressed This Visit    None  Visit Diagnoses         Codes    Rash of face    -  Primary R21        She was on vacation in [place], she was in the swimming pool, she " noticed this rash on the right side of the jaw.  It is about 1 x 1 cm size, it is slightly raised, anemia cannot be ruled out, some sort of eczema cannot be ruled out, topical hydrocortisone and Lotrimin were suggested.

## 2024-07-24 DIAGNOSIS — F41.9 ANXIETY DISORDER, UNSPECIFIED TYPE: ICD-10-CM

## 2024-07-24 RX ORDER — TRAZODONE HYDROCHLORIDE 50 MG/1
50 TABLET ORAL NIGHTLY PRN
Qty: 30 TABLET | Refills: 0 | Status: SHIPPED | OUTPATIENT
Start: 2024-07-24

## 2024-08-01 ENCOUNTER — APPOINTMENT (OUTPATIENT)
Dept: PRIMARY CARE | Facility: CLINIC | Age: 64
End: 2024-08-01
Payer: MEDICARE

## 2024-08-01 VITALS
TEMPERATURE: 96.4 F | WEIGHT: 159 LBS | DIASTOLIC BLOOD PRESSURE: 80 MMHG | SYSTOLIC BLOOD PRESSURE: 120 MMHG | BODY MASS INDEX: 32.05 KG/M2 | HEIGHT: 59 IN | HEART RATE: 78 BPM

## 2024-08-01 DIAGNOSIS — E66.09 CLASS 1 OBESITY DUE TO EXCESS CALORIES WITH SERIOUS COMORBIDITY AND BODY MASS INDEX (BMI) OF 32.0 TO 32.9 IN ADULT: Primary | ICD-10-CM

## 2024-08-01 PROCEDURE — 1036F TOBACCO NON-USER: CPT | Performed by: INTERNAL MEDICINE

## 2024-08-01 PROCEDURE — 1123F ACP DISCUSS/DSCN MKR DOCD: CPT | Performed by: INTERNAL MEDICINE

## 2024-08-01 PROCEDURE — 1160F RVW MEDS BY RX/DR IN RCRD: CPT | Performed by: INTERNAL MEDICINE

## 2024-08-01 PROCEDURE — 1159F MED LIST DOCD IN RCRD: CPT | Performed by: INTERNAL MEDICINE

## 2024-08-01 PROCEDURE — 3074F SYST BP LT 130 MM HG: CPT | Performed by: INTERNAL MEDICINE

## 2024-08-01 PROCEDURE — 3008F BODY MASS INDEX DOCD: CPT | Performed by: INTERNAL MEDICINE

## 2024-08-01 PROCEDURE — 99213 OFFICE O/P EST LOW 20 MIN: CPT | Performed by: INTERNAL MEDICINE

## 2024-08-01 PROCEDURE — 3079F DIAST BP 80-89 MM HG: CPT | Performed by: INTERNAL MEDICINE

## 2024-08-01 ASSESSMENT — ENCOUNTER SYMPTOMS
RESPIRATORY NEGATIVE: 1
PALPITATIONS: 1
GASTROINTESTINAL NEGATIVE: 1
ARTHRALGIAS: 0
DIZZINESS: 0
CONSTITUTIONAL NEGATIVE: 1

## 2024-08-01 NOTE — PROGRESS NOTES
Subjective   Patient ID: Maryan Carey is a 64 y.o. female who presents for Follow-up (Patient here for follow up ).  HPI  OARRS:  No data recorded  I have personally reviewed the OARRS report for Maryan Carey. I have considered the risks of abuse, dependence, addiction and diversion and I believe that it is clinically appropriate for Maryan Carey to be prescribed this medication    Is the patient prescribed a combination of a benzodiazepine and opioid?  No    Last Urine Drug Screen / ordered today: No  Recent Results (from the past 8760 hour(s))   Drug Screen, Urine With Reflex to Confirmation    Collection Time: 06/20/24  2:38 PM   Result Value Ref Range    Amphetamine Screen, Urine Presumptive Negative Presumptive Negative    Barbiturate Screen, Urine Presumptive Negative Presumptive Negative    Benzodiazepines Screen, Urine Presumptive Negative Presumptive Negative    Cannabinoid Screen, Urine Presumptive Negative Presumptive Negative    Cocaine Metabolite Screen, Urine Presumptive Negative Presumptive Negative    Fentanyl Screen, Urine Presumptive Negative Presumptive Negative    Opiate Screen, Urine Presumptive Negative Presumptive Negative    Oxycodone Screen, Urine Presumptive Negative Presumptive Negative    PCP Screen, Urine Presumptive Negative Presumptive Negative    Methadone Screen, Urine Presumptive Negative Presumptive Negative     Results are as expected.         Controlled Substance Agreement:  Date of the Last Agreement: 6/20/2024  Reviewed Controlled Substance Agreement including but not limited to the benefits, risks, and alternatives to treatment with a Controlled Substance medication(s).    Anorexiants:   What is the patient's goal of therapy? Wt loss  Is this being achieved with current treatment? Not really    I have assessed the patient's continuing efforts to lose weight. and I have assessed the patient's dedication to the treatment program and the response to treatment.    Patient  has demonstrated continued efforts to lose weight, is dedicated to the treatment program and the response to treatment. and I have assessed for the presence or absence of contraindications, adverse effects, and indicators of possible substance abuse that would necessitate cessation of treatment utilizing controlled substance.    Activities of Daily Living:   Is your overall impression that this patient is benefiting (symptom reduction outweighs side effects) from anorexiants therapy? Yes , although expected wt loss is not happening still.    1. Physical Functioning: Same  2. Family Relationship: Same  3. Social Relationship: Same  4. Mood: Same  5. Sleep Patterns: Same  6. Overall Function: Same    Past Medical History  History reviewed. No pertinent past medical history.    Social History  Social History     Tobacco Use    Smoking status: Never    Smokeless tobacco: Never   Vaping Use    Vaping status: Never Used   Substance Use Topics    Alcohol use: Not Currently    Drug use: Never       Family History     Family History   Problem Relation Name Age of Onset    Dementia Mother      Hypertension Mother      Lung cancer Father          Mesothelioma of lung       Allergies:  Allergies   Allergen Reactions    Other Other        Outpatient Medications:  Current Outpatient Medications   Medication Instructions    atorvastatin (LIPITOR) 20 mg, oral, Daily    cetirizine (ZyrTEC) 10 mg tablet 1 tablet, oral, Nightly    clotrimazole (Lotrimin) 1 % cream Topical, 2 times daily, apply to affected area    diclofenac sodium 1 % kit APPLY SPARINGLY TO AFFECTED AREA EVERY DAY    ferrous sulfate, 325 mg ferrous sulfate, (Iron, ferrous sulfate,) tablet Take one capsule 5 times a week in empty stomach    lisinopriL-hydrochlorothiazide 20-12.5 mg tablet 1 tablet, oral, Daily    phentermine (ADIPEX-P) 37.5 mg, oral, Daily before breakfast    traZODone (DESYREL) 50 mg, oral, Nightly PRN    venlafaxine XR (EFFEXOR-XR) 37.5 mg, oral,  "Daily with breakfast        Review of Systems   Constitutional: Negative.    Respiratory: Negative.     Cardiovascular:  Positive for palpitations.   Gastrointestinal: Negative.    Musculoskeletal:  Negative for arthralgias.   Neurological:  Negative for dizziness.         Objective       Physical Exam  Vitals reviewed.   Constitutional:       Appearance: Normal appearance. She is obese.   HENT:      Head: Normocephalic and atraumatic.   Eyes:      Conjunctiva/sclera: Conjunctivae normal.   Cardiovascular:      Rate and Rhythm: Normal rate and regular rhythm.      Pulses: Normal pulses.   Pulmonary:      Breath sounds: Normal breath sounds.   Abdominal:      Palpations: Abdomen is soft.   Musculoskeletal:         General: Normal range of motion.      Cervical back: Neck supple.   Skin:     General: Skin is warm and dry.   Neurological:      General: No focal deficit present.   Psychiatric:         Mood and Affect: Mood normal.       /80 (BP Location: Right arm, Patient Position: Sitting, BP Cuff Size: Adult)   Pulse 78   Temp 35.8 °C (96.4 °F) (Temporal)   Ht 1.499 m (4' 11\")   Wt 72.1 kg (159 lb)   BMI 32.11 kg/m²      Assessment/Plan   Problem List Items Addressed This Visit       Class 1 obesity due to excess calories with serious comorbidity and body mass index (BMI) of 32.0 to 32.9 in adult - Primary   OARRS reviewed, UDS has been done on annual basis. Rx patten and usage reviewed, adverse reactions to controlled substance and risk of habituality and addiction and overdose and death explained, pt has been explained about [facility] controlled substance policy and mandates and follow ups. Controlled substance agreement has been reviewed and renewed annually. Please keep bottles containing controlled substance medications away from children and in safe area, do not share this Rx to anyone, noncompliance will lead to cessation of therapy. So far patient has been doing well and shows " compliance.  Adipex therapy did not help much to lose weight, BMI remains around 32, her ideal weight would be less than 130 but she is targeting about 145 or less it is difficult to lose that much of weight at this age I agree with her, 1 more month of Adipex therapy will be provided do not think it is going to show that much of success.  OARRS report was reviewed, no side effects from Adipex noticed.  Rash has been subsided.

## 2024-08-06 DIAGNOSIS — E78.00 HYPERCHOLESTEROLEMIA: ICD-10-CM

## 2024-08-06 RX ORDER — ATORVASTATIN CALCIUM 20 MG/1
20 TABLET, FILM COATED ORAL DAILY
Qty: 90 TABLET | Refills: 1 | Status: SHIPPED | OUTPATIENT
Start: 2024-08-06

## 2024-08-14 ENCOUNTER — APPOINTMENT (OUTPATIENT)
Dept: HEMATOLOGY/ONCOLOGY | Facility: CLINIC | Age: 64
End: 2024-08-14
Payer: MEDICARE

## 2024-09-11 ENCOUNTER — OFFICE VISIT (OUTPATIENT)
Dept: PRIMARY CARE | Facility: CLINIC | Age: 64
End: 2024-09-11
Payer: MEDICARE

## 2024-09-11 VITALS
TEMPERATURE: 96.6 F | BODY MASS INDEX: 32.05 KG/M2 | HEIGHT: 59 IN | WEIGHT: 159 LBS | HEART RATE: 67 BPM | SYSTOLIC BLOOD PRESSURE: 112 MMHG | DIASTOLIC BLOOD PRESSURE: 78 MMHG

## 2024-09-11 DIAGNOSIS — E66.09 CLASS 1 OBESITY DUE TO EXCESS CALORIES WITH SERIOUS COMORBIDITY AND BODY MASS INDEX (BMI) OF 32.0 TO 32.9 IN ADULT: ICD-10-CM

## 2024-09-11 DIAGNOSIS — T25.122A SUPERFICIAL BURN OF LEFT FOOT, INITIAL ENCOUNTER: Primary | ICD-10-CM

## 2024-09-11 DIAGNOSIS — I10 BENIGN ESSENTIAL HYPERTENSION: ICD-10-CM

## 2024-09-11 PROCEDURE — 3074F SYST BP LT 130 MM HG: CPT | Performed by: INTERNAL MEDICINE

## 2024-09-11 PROCEDURE — 1123F ACP DISCUSS/DSCN MKR DOCD: CPT | Performed by: INTERNAL MEDICINE

## 2024-09-11 PROCEDURE — 1036F TOBACCO NON-USER: CPT | Performed by: INTERNAL MEDICINE

## 2024-09-11 PROCEDURE — 3008F BODY MASS INDEX DOCD: CPT | Performed by: INTERNAL MEDICINE

## 2024-09-11 PROCEDURE — 3078F DIAST BP <80 MM HG: CPT | Performed by: INTERNAL MEDICINE

## 2024-09-11 PROCEDURE — 1160F RVW MEDS BY RX/DR IN RCRD: CPT | Performed by: INTERNAL MEDICINE

## 2024-09-11 PROCEDURE — 1159F MED LIST DOCD IN RCRD: CPT | Performed by: INTERNAL MEDICINE

## 2024-09-11 PROCEDURE — 99213 OFFICE O/P EST LOW 20 MIN: CPT | Performed by: INTERNAL MEDICINE

## 2024-09-11 RX ORDER — PHENTERMINE HYDROCHLORIDE 37.5 MG/1
37.5 TABLET ORAL
Qty: 30 TABLET | Refills: 0 | Status: SHIPPED | OUTPATIENT
Start: 2024-09-11 | End: 2024-11-10

## 2024-09-11 ASSESSMENT — ENCOUNTER SYMPTOMS
CARDIOVASCULAR NEGATIVE: 1
ARTHRALGIAS: 0
DIZZINESS: 0
CONSTITUTIONAL NEGATIVE: 1
BURN: 1
GASTROINTESTINAL NEGATIVE: 1
RESPIRATORY NEGATIVE: 1

## 2024-09-11 NOTE — PROGRESS NOTES
Subjective   Patient ID: Maryan Carey is a 64 y.o. female who presents for Burn (Burn on left foot since 8/30 foot swollen).  Burn  The incident occurred more than 1 week ago. The burns occurred in the kitchen. The burns occurred while cooking. The burns were a result of contact with a hot liquid. The burns are located on the left foot. The pain is at a severity of 0/10. The patient is experiencing no pain. She has tried blister removal for the symptoms. The treatment provided significant relief.   OARRS:  No data recorded  I have personally reviewed the OARRS report for Maryan Carey. I have considered the risks of abuse, dependence, addiction and diversion    Is the patient prescribed a combination of a benzodiazepine and opioid?  No    Last Urine Drug Screen / ordered today: No  Recent Results (from the past 8760 hour(s))   Drug Screen, Urine With Reflex to Confirmation    Collection Time: 06/20/24  2:38 PM   Result Value Ref Range    Amphetamine Screen, Urine Presumptive Negative Presumptive Negative    Barbiturate Screen, Urine Presumptive Negative Presumptive Negative    Benzodiazepines Screen, Urine Presumptive Negative Presumptive Negative    Cannabinoid Screen, Urine Presumptive Negative Presumptive Negative    Cocaine Metabolite Screen, Urine Presumptive Negative Presumptive Negative    Fentanyl Screen, Urine Presumptive Negative Presumptive Negative    Opiate Screen, Urine Presumptive Negative Presumptive Negative    Oxycodone Screen, Urine Presumptive Negative Presumptive Negative    PCP Screen, Urine Presumptive Negative Presumptive Negative    Methadone Screen, Urine Presumptive Negative Presumptive Negative     Results are as expected.         Controlled Substance Agreement:  Date of the Last Agreement: 6/20/24  Reviewed Controlled Substance Agreement including but not limited to the benefits, risks, and alternatives to treatment with a Controlled Substance medication(s).    Anorexiants:   What is  the patient's goal of therapy? Wt loss  Is this being achieved with current treatment? no    I have assessed the patient's continuing efforts to lose weight., I have assessed the patient's dedication to the treatment program and the response to treatment., and I have assessed the presence or absence of contraindications, adverse effects, and indicators of possible substance abuse that would necessitate cessation of treatment utilizing controlled substance.    I have assessed for the presence or absence of contraindications, adverse effects, and indicators of possible substance abuse that would necessitate cessation of treatment utilizing controlled substance.    Activities of Daily Living:   Is your overall impression that this patient is benefiting (symptom reduction outweighs side effects) from anorexiants therapy? No     1. Physical Functioning: Same  2. Family Relationship: Same  3. Social Relationship: Same  4. Mood: Same  5. Sleep Patterns: Same  6. Overall Function: Same  Hypertension  This is a chronic problem. The current episode started more than 1 year ago. The problem has been resolved since onset. The problem is controlled. Associated symptoms include anxiety. Pertinent negatives include no blurred vision, chest pain, peripheral edema or shortness of breath. Risk factors for coronary artery disease include obesity and post-menopausal state. Past treatments include ACE inhibitors and diuretics. The current treatment provides moderate improvement. There are no compliance problems.  There is no history of angina or kidney disease    Past Medical History  History reviewed. No pertinent past medical history.    Social History  Social History     Tobacco Use    Smoking status: Never    Smokeless tobacco: Never   Vaping Use    Vaping status: Never Used   Substance Use Topics    Alcohol use: Not Currently    Drug use: Never       Family History     Family History   Problem Relation Name Age of Onset    Dementia  Mother      Hypertension Mother      Lung cancer Father          Mesothelioma of lung       Allergies:  Allergies   Allergen Reactions    Other Other        Outpatient Medications:  Current Outpatient Medications   Medication Instructions    atorvastatin (LIPITOR) 20 mg, oral, Daily    cetirizine (ZyrTEC) 10 mg tablet 1 tablet, oral, Nightly    clotrimazole (Lotrimin) 1 % cream Topical, 2 times daily, apply to affected area    diclofenac sodium 1 % kit APPLY SPARINGLY TO AFFECTED AREA EVERY DAY    ferrous sulfate, 325 mg ferrous sulfate, (Iron, ferrous sulfate,) tablet Take one capsule 5 times a week in empty stomach    lisinopriL-hydrochlorothiazide 20-12.5 mg tablet 1 tablet, oral, Daily    phentermine (ADIPEX-P) 37.5 mg, oral, Daily before breakfast    traZODone (DESYREL) 50 mg, oral, Nightly PRN    venlafaxine XR (EFFEXOR-XR) 37.5 mg, oral, Daily with breakfast        Review of Systems   Constitutional: Negative.    Respiratory: Negative.     Cardiovascular: Negative.    Gastrointestinal: Negative.    Musculoskeletal:  Negative for arthralgias.   Skin:         Burn on foot   Neurological:  Negative for dizziness.         Objective       Physical Exam  Vitals reviewed.   Constitutional:       Appearance: Normal appearance. She is obese.   HENT:      Head: Normocephalic.   Eyes:      Conjunctiva/sclera: Conjunctivae normal.   Cardiovascular:      Rate and Rhythm: Normal rate and regular rhythm.      Pulses: Normal pulses.   Pulmonary:      Effort: Pulmonary effort is normal.      Breath sounds: Normal breath sounds.   Abdominal:      Palpations: Abdomen is soft.   Musculoskeletal:         General: Normal range of motion.      Cervical back: Normal range of motion.   Skin:     General: Skin is warm and dry.      Comments: Dry open blistered deroofed area, no moisture, no open wound   Neurological:      General: No focal deficit present.   Psychiatric:         Mood and Affect: Mood normal.     /78 (BP  "Location: Right arm, Patient Position: Sitting, BP Cuff Size: Adult)   Pulse 67   Temp 35.9 °C (96.6 °F) (Temporal)   Ht 1.499 m (4' 11\")   Wt 72.1 kg (159 lb)   BMI 32.11 kg/m²      Assessment/Plan   Problem List Items Addressed This Visit       Benign essential hypertension    Class 1 obesity due to excess calories with serious comorbidity and body mass index (BMI) of 32.0 to 32.9 in adult     Other Visit Diagnoses       Superficial burn of left foot, initial encounter    -  Primary        She took her 2 months worth of Adipex, I do not see much weight loss, I can give her 1 more month of trial, told her that she has to work also along with the Adipex treatment not to eat junk food or not to eat unhealthy food or not to look for high calorie dense food, it can give you anorexia but we have to control our diet also I told her.  She has a superficial burn on the dorsum of the left foot, it happened because of spilling of the hot water, it is dried up, new skin is being formed, there is no wetness, nothing to be done, there is no superadded infection.  Her BP readings are normal, lets try 1 more month of Adipex treatment with intense dietary modifications and intense calorie restrictions, cannot do more than 1 month of Adipex, or Cipro was reviewed before giving another round of Adipex.  She will keep her next appointment.  "

## 2024-10-24 ENCOUNTER — APPOINTMENT (OUTPATIENT)
Dept: PRIMARY CARE | Facility: CLINIC | Age: 64
End: 2024-10-24
Payer: MEDICARE

## 2024-11-06 ENCOUNTER — APPOINTMENT (OUTPATIENT)
Dept: PRIMARY CARE | Facility: CLINIC | Age: 64
End: 2024-11-06
Payer: MEDICARE

## 2024-11-21 ENCOUNTER — APPOINTMENT (OUTPATIENT)
Dept: PRIMARY CARE | Facility: CLINIC | Age: 64
End: 2024-11-21
Payer: MEDICARE

## 2024-11-21 VITALS
TEMPERATURE: 95.5 F | HEART RATE: 77 BPM | SYSTOLIC BLOOD PRESSURE: 117 MMHG | HEIGHT: 59 IN | WEIGHT: 162 LBS | DIASTOLIC BLOOD PRESSURE: 78 MMHG | BODY MASS INDEX: 32.66 KG/M2

## 2024-11-21 DIAGNOSIS — Z12.11 COLON CANCER SCREENING: ICD-10-CM

## 2024-11-21 DIAGNOSIS — F41.9 ANXIETY DISORDER, UNSPECIFIED TYPE: ICD-10-CM

## 2024-11-21 DIAGNOSIS — E78.00 HYPERCHOLESTEROLEMIA: ICD-10-CM

## 2024-11-21 DIAGNOSIS — I10 BENIGN ESSENTIAL HYPERTENSION: Primary | ICD-10-CM

## 2024-11-21 DIAGNOSIS — F51.01 PRIMARY INSOMNIA: ICD-10-CM

## 2024-11-21 DIAGNOSIS — E66.09 CLASS 1 OBESITY DUE TO EXCESS CALORIES WITH SERIOUS COMORBIDITY AND BODY MASS INDEX (BMI) OF 32.0 TO 32.9 IN ADULT: ICD-10-CM

## 2024-11-21 DIAGNOSIS — E66.811 CLASS 1 OBESITY DUE TO EXCESS CALORIES WITH SERIOUS COMORBIDITY AND BODY MASS INDEX (BMI) OF 32.0 TO 32.9 IN ADULT: ICD-10-CM

## 2024-11-21 DIAGNOSIS — E55.9 VITAMIN D DEFICIENCY: ICD-10-CM

## 2024-11-21 DIAGNOSIS — Z12.31 ENCOUNTER FOR SCREENING MAMMOGRAM FOR MALIGNANT NEOPLASM OF BREAST: ICD-10-CM

## 2024-11-21 DIAGNOSIS — F98.8 ATTENTION DEFICIT DISORDER, UNSPECIFIED TYPE: ICD-10-CM

## 2024-11-21 PROCEDURE — 1123F ACP DISCUSS/DSCN MKR DOCD: CPT | Performed by: INTERNAL MEDICINE

## 2024-11-21 PROCEDURE — 1159F MED LIST DOCD IN RCRD: CPT | Performed by: INTERNAL MEDICINE

## 2024-11-21 PROCEDURE — 3008F BODY MASS INDEX DOCD: CPT | Performed by: INTERNAL MEDICINE

## 2024-11-21 PROCEDURE — 1160F RVW MEDS BY RX/DR IN RCRD: CPT | Performed by: INTERNAL MEDICINE

## 2024-11-21 PROCEDURE — 3074F SYST BP LT 130 MM HG: CPT | Performed by: INTERNAL MEDICINE

## 2024-11-21 PROCEDURE — 99213 OFFICE O/P EST LOW 20 MIN: CPT | Performed by: INTERNAL MEDICINE

## 2024-11-21 PROCEDURE — 3078F DIAST BP <80 MM HG: CPT | Performed by: INTERNAL MEDICINE

## 2024-11-21 PROCEDURE — 1036F TOBACCO NON-USER: CPT | Performed by: INTERNAL MEDICINE

## 2024-11-21 ASSESSMENT — ENCOUNTER SYMPTOMS
APNEA: 0
OCCASIONAL FEELINGS OF UNSTEADINESS: 0
DIAPHORESIS: 0
NUMBNESS: 1
COUGH: 0
INSOMNIA: 1
CARDIOVASCULAR NEGATIVE: 1
ARTHRALGIAS: 0
SHORTNESS OF BREATH: 0
GASTROINTESTINAL NEGATIVE: 1
DEPRESSION: 0
LOSS OF SENSATION IN FEET: 0
WEAKNESS: 0
ABDOMINAL PAIN: 0

## 2024-11-21 ASSESSMENT — COLUMBIA-SUICIDE SEVERITY RATING SCALE - C-SSRS
2. HAVE YOU ACTUALLY HAD ANY THOUGHTS OF KILLING YOURSELF?: NO
6. HAVE YOU EVER DONE ANYTHING, STARTED TO DO ANYTHING, OR PREPARED TO DO ANYTHING TO END YOUR LIFE?: NO
1. IN THE PAST MONTH, HAVE YOU WISHED YOU WERE DEAD OR WISHED YOU COULD GO TO SLEEP AND NOT WAKE UP?: NO

## 2024-11-21 NOTE — PROGRESS NOTES
Subjective   Patient ID: Maryan Carey is a 64 y.o. female who presents for Follow-up (4 mo fu).  Insomnia  This is a chronic problem. The current episode started more than 1 year ago. The problem occurs intermittently. The problem has been waxing and waning. Associated symptoms include numbness. Pertinent negatives include no abdominal pain, arthralgias, congestion, coughing, diaphoresis or weakness. The treatment provided moderate relief.     Hypertension  This is a chronic problem. The current episode started more than 1 year ago. The problem has been resolved since onset. The problem is controlled. Associated symptoms include anxiety. Pertinent negatives include no blurred vision, chest pain, peripheral edema or shortness of breath. Risk factors for coronary artery disease include obesity and post-menopausal state. Past treatments include ACE inhibitors and diuretics. The current treatment provides moderate improvement. There are no compliance problems.  There is no history of angina or kidney disease. There is no history of chronic renal disease.   Anxiety  Presents for follow-up visit. Patient reports no chest pain, compulsions, confusion, decreased concentration, depressed mood, dizziness, feeling of choking, hyperventilation, restlessness or shortness of breath. The quality of sleep is restorative.   Past Medical History  History reviewed. No pertinent past medical history.    Social History  Social History     Tobacco Use    Smoking status: Never    Smokeless tobacco: Never   Vaping Use    Vaping status: Never Used   Substance Use Topics    Alcohol use: Not Currently    Drug use: Never       Family History     Family History   Problem Relation Name Age of Onset    Dementia Mother      Hypertension Mother      Lung cancer Father          Mesothelioma of lung       Allergies:  Allergies   Allergen Reactions    Other Other        Outpatient Medications:  Current Outpatient Medications   Medication  "Instructions    atorvastatin (LIPITOR) 20 mg, oral, Daily    cetirizine (ZyrTEC) 10 mg tablet 1 tablet, oral, Nightly    diclofenac sodium 1 % kit APPLY SPARINGLY TO AFFECTED AREA EVERY DAY    ferrous sulfate, 325 mg ferrous sulfate, (Iron, ferrous sulfate,) tablet Take one capsule 5 times a week in empty stomach    lisinopriL-hydrochlorothiazide 20-12.5 mg tablet 1 tablet, oral, Daily    traZODone (DESYREL) 50 mg, oral, Nightly PRN    venlafaxine XR (EFFEXOR-XR) 37.5 mg, oral, Daily with breakfast        Review of Systems   Constitutional:  Negative for diaphoresis.   HENT:  Negative for congestion.    Respiratory:  Negative for apnea, cough and shortness of breath.    Cardiovascular: Negative.    Gastrointestinal: Negative.  Negative for abdominal pain.   Musculoskeletal:  Negative for arthralgias.   Neurological:  Positive for numbness. Negative for weakness.   Psychiatric/Behavioral:  The patient has insomnia.          Objective       Physical Exam  Vitals reviewed.   Constitutional:       Appearance: Normal appearance. She is obese.   HENT:      Head: Normocephalic and atraumatic.   Eyes:      Conjunctiva/sclera: Conjunctivae normal.   Cardiovascular:      Rate and Rhythm: Normal rate and regular rhythm.      Pulses: Normal pulses.      Heart sounds: Murmur heard.   Pulmonary:      Effort: Pulmonary effort is normal.      Breath sounds: Normal breath sounds.   Abdominal:      Palpations: Abdomen is soft.   Musculoskeletal:         General: Normal range of motion.      Cervical back: Normal range of motion.   Skin:     General: Skin is warm and dry.   Neurological:      General: No focal deficit present.   Psychiatric:         Mood and Affect: Mood normal.       /78 (BP Location: Right arm, Patient Position: Sitting, BP Cuff Size: Adult)   Pulse 77   Temp 35.3 °C (95.5 °F) (Temporal)   Ht 1.499 m (4' 11\")   Wt 73.5 kg (162 lb)   BMI 32.72 kg/m²      Assessment/Plan   Problem List Items Addressed This " Visit       Anxiety disorder    Benign essential hypertension - Primary    Class 1 obesity due to excess calories with serious comorbidity and body mass index (BMI) of 32.0 to 32.9 in adult    Primary insomnia   She believes that she has attention deficit disorder, she cannot focus I would ask for neurology evaluation to see if she is a candidate for ADD.  She is not able to lose weight, she took phentermine for 3 months not a profound weight loss identified, she remains on trazodone, she remains on venlafaxine, she does not have any systolic murmur to be concerned about.  There is a soft murmur, echo was done.  Her ferritin level was high, she needs to have a hemoglobin level checked again may not need iron, or it could be the microcytic indicis with improper usage or utilization of iron, BP readings are stable, she continue to have a class I obesity.  She needs mammography, colon cancer screening with colonoscopy, we will check vitamin B12, vitamin D, she remains on ongoing statin treatment.  No depression, no anhedonia, she has a part-time job, colon cancer screening is highly due and I requested and urged to do so.  Otherwise little bit heavyset female patient looks well on a physical exam several laboratories were ordered as listed with the screening test vaccinations are reviewed, follow-up in 4 months.

## 2024-11-25 ENCOUNTER — APPOINTMENT (OUTPATIENT)
Dept: NEUROLOGY | Facility: CLINIC | Age: 64
End: 2024-11-25
Payer: MEDICARE

## 2024-12-05 ENCOUNTER — APPOINTMENT (OUTPATIENT)
Dept: PRIMARY CARE | Facility: CLINIC | Age: 64
End: 2024-12-05
Payer: MEDICARE

## 2024-12-31 ENCOUNTER — APPOINTMENT (OUTPATIENT)
Dept: ORTHOPEDIC SURGERY | Facility: CLINIC | Age: 64
End: 2024-12-31
Payer: MEDICARE

## 2025-01-03 ENCOUNTER — APPOINTMENT (OUTPATIENT)
Dept: CARDIOLOGY | Facility: HOSPITAL | Age: 65
End: 2025-01-03
Payer: MEDICARE

## 2025-01-03 ENCOUNTER — APPOINTMENT (OUTPATIENT)
Dept: RADIOLOGY | Facility: HOSPITAL | Age: 65
End: 2025-01-03
Payer: MEDICARE

## 2025-01-03 ENCOUNTER — HOSPITAL ENCOUNTER (EMERGENCY)
Facility: HOSPITAL | Age: 65
Discharge: HOME | End: 2025-01-04
Attending: STUDENT IN AN ORGANIZED HEALTH CARE EDUCATION/TRAINING PROGRAM
Payer: MEDICARE

## 2025-01-03 VITALS
HEIGHT: 58 IN | SYSTOLIC BLOOD PRESSURE: 146 MMHG | OXYGEN SATURATION: 99 % | HEART RATE: 89 BPM | RESPIRATION RATE: 18 BRPM | DIASTOLIC BLOOD PRESSURE: 87 MMHG | TEMPERATURE: 98.4 F | WEIGHT: 161 LBS | BODY MASS INDEX: 33.8 KG/M2

## 2025-01-03 DIAGNOSIS — R07.9 CHEST PAIN, UNSPECIFIED TYPE: Primary | ICD-10-CM

## 2025-01-03 LAB
ALBUMIN SERPL BCP-MCNC: 4.1 G/DL (ref 3.4–5)
ALP SERPL-CCNC: 120 U/L (ref 33–136)
ALT SERPL W P-5'-P-CCNC: 19 U/L (ref 7–45)
ANION GAP SERPL CALC-SCNC: 12 MMOL/L (ref 10–20)
AST SERPL W P-5'-P-CCNC: 25 U/L (ref 9–39)
BASOPHILS # BLD AUTO: 0.04 X10*3/UL (ref 0–0.1)
BASOPHILS NFR BLD AUTO: 0.6 %
BILIRUB SERPL-MCNC: 0.2 MG/DL (ref 0–1.2)
BNP SERPL-MCNC: 16 PG/ML (ref 0–99)
BUN SERPL-MCNC: 13 MG/DL (ref 6–23)
CALCIUM SERPL-MCNC: 9.3 MG/DL (ref 8.6–10.3)
CARDIAC TROPONIN I PNL SERPL HS: 4 NG/L (ref 0–13)
CARDIAC TROPONIN I PNL SERPL HS: 7 NG/L (ref 0–13)
CHLORIDE SERPL-SCNC: 106 MMOL/L (ref 98–107)
CO2 SERPL-SCNC: 28 MMOL/L (ref 21–32)
CREAT SERPL-MCNC: 0.89 MG/DL (ref 0.5–1.05)
EGFRCR SERPLBLD CKD-EPI 2021: 72 ML/MIN/1.73M*2
EOSINOPHIL # BLD AUTO: 0.24 X10*3/UL (ref 0–0.7)
EOSINOPHIL NFR BLD AUTO: 3.5 %
ERYTHROCYTE [DISTWIDTH] IN BLOOD BY AUTOMATED COUNT: 12.7 % (ref 11.5–14.5)
FLUAV RNA RESP QL NAA+PROBE: NOT DETECTED
FLUBV RNA RESP QL NAA+PROBE: NOT DETECTED
GLUCOSE SERPL-MCNC: 111 MG/DL (ref 74–99)
HCT VFR BLD AUTO: 33.7 % (ref 36–46)
HGB BLD-MCNC: 10.6 G/DL (ref 12–16)
HOLD SPECIMEN: NORMAL
IMM GRANULOCYTES # BLD AUTO: 0.02 X10*3/UL (ref 0–0.7)
IMM GRANULOCYTES NFR BLD AUTO: 0.3 % (ref 0–0.9)
INR PPP: 1.1 (ref 0.9–1.1)
LYMPHOCYTES # BLD AUTO: 2.25 X10*3/UL (ref 1.2–4.8)
LYMPHOCYTES NFR BLD AUTO: 32.4 %
MAGNESIUM SERPL-MCNC: 2.07 MG/DL (ref 1.6–2.4)
MCH RBC QN AUTO: 26.8 PG (ref 26–34)
MCHC RBC AUTO-ENTMCNC: 31.5 G/DL (ref 32–36)
MCV RBC AUTO: 85 FL (ref 80–100)
MONOCYTES # BLD AUTO: 0.55 X10*3/UL (ref 0.1–1)
MONOCYTES NFR BLD AUTO: 7.9 %
NEUTROPHILS # BLD AUTO: 3.84 X10*3/UL (ref 1.2–7.7)
NEUTROPHILS NFR BLD AUTO: 55.3 %
NRBC BLD-RTO: 0 /100 WBCS (ref 0–0)
PHOSPHATE SERPL-MCNC: 3.3 MG/DL (ref 2.5–4.9)
PLATELET # BLD AUTO: 316 X10*3/UL (ref 150–450)
POTASSIUM SERPL-SCNC: 3.9 MMOL/L (ref 3.5–5.3)
PROT SERPL-MCNC: 7.3 G/DL (ref 6.4–8.2)
PROTHROMBIN TIME: 12.2 SECONDS (ref 9.8–12.8)
RBC # BLD AUTO: 3.96 X10*6/UL (ref 4–5.2)
RSV RNA RESP QL NAA+PROBE: NOT DETECTED
SARS-COV-2 RNA RESP QL NAA+PROBE: NOT DETECTED
SODIUM SERPL-SCNC: 142 MMOL/L (ref 136–145)
WBC # BLD AUTO: 6.9 X10*3/UL (ref 4.4–11.3)

## 2025-01-03 PROCEDURE — 73030 X-RAY EXAM OF SHOULDER: CPT | Mod: LT

## 2025-01-03 PROCEDURE — 99285 EMERGENCY DEPT VISIT HI MDM: CPT | Mod: 25 | Performed by: STUDENT IN AN ORGANIZED HEALTH CARE EDUCATION/TRAINING PROGRAM

## 2025-01-03 PROCEDURE — 85025 COMPLETE CBC W/AUTO DIFF WBC: CPT | Performed by: STUDENT IN AN ORGANIZED HEALTH CARE EDUCATION/TRAINING PROGRAM

## 2025-01-03 PROCEDURE — 93005 ELECTROCARDIOGRAM TRACING: CPT

## 2025-01-03 PROCEDURE — 87637 SARSCOV2&INF A&B&RSV AMP PRB: CPT

## 2025-01-03 PROCEDURE — 36415 COLL VENOUS BLD VENIPUNCTURE: CPT | Performed by: STUDENT IN AN ORGANIZED HEALTH CARE EDUCATION/TRAINING PROGRAM

## 2025-01-03 PROCEDURE — 80053 COMPREHEN METABOLIC PANEL: CPT | Performed by: STUDENT IN AN ORGANIZED HEALTH CARE EDUCATION/TRAINING PROGRAM

## 2025-01-03 PROCEDURE — 84484 ASSAY OF TROPONIN QUANT: CPT | Performed by: STUDENT IN AN ORGANIZED HEALTH CARE EDUCATION/TRAINING PROGRAM

## 2025-01-03 PROCEDURE — 84100 ASSAY OF PHOSPHORUS: CPT

## 2025-01-03 PROCEDURE — 99285 EMERGENCY DEPT VISIT HI MDM: CPT | Performed by: STUDENT IN AN ORGANIZED HEALTH CARE EDUCATION/TRAINING PROGRAM

## 2025-01-03 PROCEDURE — 71045 X-RAY EXAM CHEST 1 VIEW: CPT | Mod: COMPUTED RADIOGRAPHY X-RAY | Performed by: RADIOLOGY

## 2025-01-03 PROCEDURE — 83880 ASSAY OF NATRIURETIC PEPTIDE: CPT

## 2025-01-03 PROCEDURE — 73030 X-RAY EXAM OF SHOULDER: CPT | Mod: LEFT SIDE | Performed by: RADIOLOGY

## 2025-01-03 PROCEDURE — 83735 ASSAY OF MAGNESIUM: CPT

## 2025-01-03 PROCEDURE — 71045 X-RAY EXAM CHEST 1 VIEW: CPT | Mod: FY

## 2025-01-03 PROCEDURE — 93010 ELECTROCARDIOGRAM REPORT: CPT | Performed by: STUDENT IN AN ORGANIZED HEALTH CARE EDUCATION/TRAINING PROGRAM

## 2025-01-03 PROCEDURE — 85610 PROTHROMBIN TIME: CPT | Performed by: STUDENT IN AN ORGANIZED HEALTH CARE EDUCATION/TRAINING PROGRAM

## 2025-01-03 RX ORDER — ACETAMINOPHEN 325 MG/1
975 TABLET ORAL ONCE
Status: DISCONTINUED | OUTPATIENT
Start: 2025-01-03 | End: 2025-01-04 | Stop reason: HOSPADM

## 2025-01-03 ASSESSMENT — HEART SCORE
AGE: 65+
ECG: NORMAL
HEART SCORE: 3
HISTORY: SLIGHTLY SUSPICIOUS
TROPONIN: LESS THAN OR EQUAL TO NORMAL LIMIT
RISK FACTORS: 1-2 RISK FACTORS

## 2025-01-03 ASSESSMENT — COLUMBIA-SUICIDE SEVERITY RATING SCALE - C-SSRS
1. IN THE PAST MONTH, HAVE YOU WISHED YOU WERE DEAD OR WISHED YOU COULD GO TO SLEEP AND NOT WAKE UP?: NO
6. HAVE YOU EVER DONE ANYTHING, STARTED TO DO ANYTHING, OR PREPARED TO DO ANYTHING TO END YOUR LIFE?: NO
2. HAVE YOU ACTUALLY HAD ANY THOUGHTS OF KILLING YOURSELF?: NO

## 2025-01-03 ASSESSMENT — PAIN - FUNCTIONAL ASSESSMENT
PAIN_FUNCTIONAL_ASSESSMENT: 0-10
PAIN_FUNCTIONAL_ASSESSMENT: 0-10

## 2025-01-03 ASSESSMENT — PAIN SCALES - GENERAL
PAINLEVEL_OUTOF10: 5 - MODERATE PAIN
PAINLEVEL_OUTOF10: 7
PAINLEVEL_OUTOF10: 5 - MODERATE PAIN
PAINLEVEL_OUTOF10: 7

## 2025-01-03 ASSESSMENT — LIFESTYLE VARIABLES
EVER FELT BAD OR GUILTY ABOUT YOUR DRINKING: NO
HAVE YOU EVER FELT YOU SHOULD CUT DOWN ON YOUR DRINKING: NO
EVER HAD A DRINK FIRST THING IN THE MORNING TO STEADY YOUR NERVES TO GET RID OF A HANGOVER: NO
HAVE PEOPLE ANNOYED YOU BY CRITICIZING YOUR DRINKING: NO
TOTAL SCORE: 0

## 2025-01-03 ASSESSMENT — PAIN DESCRIPTION - LOCATION
LOCATION: CHEST
LOCATION: SHOULDER

## 2025-01-03 ASSESSMENT — PAIN DESCRIPTION - PAIN TYPE
TYPE: ACUTE PAIN
TYPE: ACUTE PAIN

## 2025-01-03 ASSESSMENT — PAIN DESCRIPTION - DESCRIPTORS
DESCRIPTORS: TIGHTNESS
DESCRIPTORS: ACHING

## 2025-01-03 ASSESSMENT — PAIN DESCRIPTION - PROGRESSION
CLINICAL_PROGRESSION: GRADUALLY IMPROVING
CLINICAL_PROGRESSION: NOT CHANGED

## 2025-01-03 ASSESSMENT — PAIN DESCRIPTION - FREQUENCY
FREQUENCY: CONSTANT/CONTINUOUS
FREQUENCY: CONSTANT/CONTINUOUS

## 2025-01-03 ASSESSMENT — PAIN DESCRIPTION - ORIENTATION: ORIENTATION: LEFT

## 2025-01-04 LAB
ATRIAL RATE: 80 BPM
ATRIAL RATE: 97 BPM
P AXIS: 37 DEGREES
P AXIS: 55 DEGREES
P OFFSET: 173 MS
P OFFSET: 181 MS
P ONSET: 123 MS
P ONSET: 129 MS
PR INTERVAL: 182 MS
PR INTERVAL: 186 MS
Q ONSET: 216 MS
Q ONSET: 220 MS
QRS COUNT: 13 BEATS
QRS COUNT: 16 BEATS
QRS DURATION: 74 MS
QRS DURATION: 78 MS
QT INTERVAL: 370 MS
QT INTERVAL: 402 MS
QTC CALCULATION(BAZETT): 463 MS
QTC CALCULATION(BAZETT): 469 MS
QTC FREDERICIA: 434 MS
QTC FREDERICIA: 442 MS
R AXIS: 18 DEGREES
R AXIS: 25 DEGREES
T AXIS: 32 DEGREES
T AXIS: 56 DEGREES
T OFFSET: 405 MS
T OFFSET: 417 MS
VENTRICULAR RATE: 80 BPM
VENTRICULAR RATE: 97 BPM

## 2025-01-04 NOTE — ED PROVIDER NOTES
Emergency Department Provider Note        History of Present Illness     History provided by: Patient  Limitations to History: None  External Records Reviewed with Brief Summary:  Medical chart review    HPI:  Maryan Carey is a 64 y.o. female with medical history of hypertension and hyperlipidemia as well as osteoarthritis and anxiety who arrives to the emergency department approximately 5 days of left-sided shoulder pain that radiates down her left arm, worse when she moves her left arm.  Patient states that she has no family history of myocardial infarction, does not use tobacco alcohol or illicit drugs.  She does endorse mild dyspnea on exertion as well however has been saturating well.  She states that she does have a history of osteoarthritis and believes that might be affecting her left arm.    Physical Exam   Triage vitals:  T 37.2 °C (99 °F)  HR 90  /85  RR 18  O2 98 % None (Room air)    General: Awake, alert, in no acute distress  Eyes: Gaze conjugate.  No scleral icterus or injection  HENT: Normo-cephalic, atraumatic. No stridor  CV: Regular rate, regular rhythm. Radial pulses 2+ bilaterally  Resp: Breathing non-labored, speaking in full sentences.  Clear to auscultation bilaterally  GI: Soft, non-distended, non-tender. No rebound or guarding.  : Deferred  MSK/Extremities: No gross bony deformities. Moving all extremities  Skin: Warm. Appropriate color  Neuro: Alert. Oriented. Face symmetric. Speech is fluent.  Gross strength and sensation intact in b/l UE and LEs  Psych: Appropriate mood and affect    Medical Decision Making & ED Course   Medical Decision Makin y.o. female arrives with chief complaint of 5 days of chest pain radiating into left arm as well as osteoarthritic pain.  Troponin and delta were ordered and were negative patient tested negative for influenza COVID and RSV.  BNP 16 rule out CHF exacerbation.  Magnesium phosphorus and CMP showed no acute electrolyte dyscrasia  normal renal and hepatic function.  PT/INR within normal limits CBC shows no signs of anemia or leukocytosis to indicate systemic infection.  Chest x-ray unremarkable.  Shoulder x-ray ordered for evaluation of arthritic degeneration which showed moderate arthritic degeneration.  Patient was given a follow-up with her primary care provider as well as with cardiology.  Patient given strict return precautions, understands plan of care.  Patient discharged home.  ----  Scoring Tools Utilized: HEART Score: 3       Differential diagnoses considered include but are not limited to: ACS, osteoarthritis, adhesive capsulitis upper respiratory infection     Social Determinants of Health which Significantly Impact Care: None identified     EKG Independent Interpretation: EKG interpreted by myself. Please see ED Course for full interpretation.    Independent Result Review and Interpretation: Relevant laboratory and radiographic results were reviewed and independently interpreted by myself.  As necessary, they are commented on in the ED Course.    Chronic conditions affecting the patient's care: As documented above in [facility]    The patient was discussed with the following consultants/services: None    Care Considerations: As documented above in [facility]    ED Course:  ED Course as of 01/03/25 2353   Fri Jan 03, 2025   1838 Initial twelve-lead ECG at 18 [id number] is interpreted by me shows normal sinus rhythm with normal axis ventricular rate of 97 bpm, , QTc 469, no acute injury pattern, no high-grade AV glenn block. [PV]   2040 Twelve-lead ECG is interpreted by me shows normal sinus rhythm with a ventricular rate of 80 bpm, normal axis, , QTc 463 no acute injury pattern no high-grade AV glenn block. [PV]   2202 Flu A Result: Not Detected [PV]   2202 Flu B Result: Not Detected [PV]   2202 Coronavirus 2019, PCR: Not Detected [PV]   2202 RSV PCR: Not Detected [PV]   2202 Troponin I, High Sensitivity: 4 [PV]   2202 Troponin I, High  Sensitivity: 7 [PV]   2202 INR: 1.1 [PV]   2202 Protime: 12.2 [PV]   2203 XR chest 1 view  IMPRESSION:  1. No acute cardiopulmonary process.  2. Moderate S shaped thoracolumbar scoliosis   [PV]   2352 XR shoulder left 2+ views  FINDINGS:  Similar moderate arthritic degeneration of the left shoulder with  spurring and suspected joint space loss. No acute fracture or  dislocation.      IMPRESSION:  Osteoarthritis.   [PV]      ED Course User Index  [PV] Faustino Erickson DO         Diagnoses as of 01/03/25 2353   Chest pain, unspecified type     Disposition   As a result of the work-up, the patient was discharged home.  she was informed of her diagnosis and instructed to come back with any concerns or worsening of condition.  she and was agreeable to the plan as discussed above.  she was given the opportunity to ask questions.  All of the patient's questions were answered.    Procedures   Procedures    Patient seen and discussed with ED attending physician.    Faustino Erickson DO  Emergency Medicine     Faustino Erickson DO  Resident  01/03/25 9739

## 2025-01-04 NOTE — DISCHARGE INSTRUCTIONS
Please follow-up with your primary care physician as soon as possible, call their office to let them know they were seen and evaluated in the emergency department so they have access to your laboratory work and results.  We also recommend that you follow-up with a cardiologist, you are being provided with the information for Dr. England who is a cardiologist as well as a general cardiology referral.  Please return to the emergency department if you have worsening chest pain, episodes where you feel like you cannot breathe, episodes of vomiting, episodes of passing out, or if you have significant worsening of your symptoms.  You can always return to any emergency department anytime if you need to be reevaluated.

## 2025-01-07 ENCOUNTER — OFFICE VISIT (OUTPATIENT)
Dept: OBGYN CLINIC | Age: 65
End: 2025-01-07
Payer: MEDICARE

## 2025-01-07 VITALS
HEIGHT: 58 IN | BODY MASS INDEX: 34.43 KG/M2 | DIASTOLIC BLOOD PRESSURE: 68 MMHG | SYSTOLIC BLOOD PRESSURE: 114 MMHG | WEIGHT: 164 LBS

## 2025-01-07 DIAGNOSIS — Z12.4 CERVICAL CANCER SCREENING: ICD-10-CM

## 2025-01-07 DIAGNOSIS — Z12.31 SCREENING MAMMOGRAM FOR BREAST CANCER: ICD-10-CM

## 2025-01-07 DIAGNOSIS — Z01.419 ENCOUNTER FOR WELL WOMAN EXAM WITH ROUTINE GYNECOLOGICAL EXAM: Primary | ICD-10-CM

## 2025-01-07 DIAGNOSIS — Z01.419 ENCOUNTER FOR WELL WOMAN EXAM WITH ROUTINE GYNECOLOGICAL EXAM: ICD-10-CM

## 2025-01-07 DIAGNOSIS — Z11.51 SCREENING FOR HUMAN PAPILLOMAVIRUS: ICD-10-CM

## 2025-01-07 PROCEDURE — 99397 PER PM REEVAL EST PAT 65+ YR: CPT | Performed by: OBSTETRICS & GYNECOLOGY

## 2025-01-07 SDOH — ECONOMIC STABILITY: FOOD INSECURITY: WITHIN THE PAST 12 MONTHS, YOU WORRIED THAT YOUR FOOD WOULD RUN OUT BEFORE YOU GOT MONEY TO BUY MORE.: NEVER TRUE

## 2025-01-07 SDOH — ECONOMIC STABILITY: INCOME INSECURITY: HOW HARD IS IT FOR YOU TO PAY FOR THE VERY BASICS LIKE FOOD, HOUSING, MEDICAL CARE, AND HEATING?: NOT HARD AT ALL

## 2025-01-07 SDOH — ECONOMIC STABILITY: FOOD INSECURITY: WITHIN THE PAST 12 MONTHS, THE FOOD YOU BOUGHT JUST DIDN'T LAST AND YOU DIDN'T HAVE MONEY TO GET MORE.: NEVER TRUE

## 2025-01-07 ASSESSMENT — ENCOUNTER SYMPTOMS
CONSTIPATION: 0
ANAL BLEEDING: 0
ABDOMINAL DISTENTION: 0
RESPIRATORY NEGATIVE: 1
DIARRHEA: 0
RECTAL PAIN: 0
VOMITING: 0
ABDOMINAL PAIN: 0
BLOOD IN STOOL: 0
NAUSEA: 0
EYES NEGATIVE: 1
ALLERGIC/IMMUNOLOGIC NEGATIVE: 1

## 2025-01-07 ASSESSMENT — PATIENT HEALTH QUESTIONNAIRE - PHQ9
1. LITTLE INTEREST OR PLEASURE IN DOING THINGS: NOT AT ALL
2. FEELING DOWN, DEPRESSED OR HOPELESS: NOT AT ALL
SUM OF ALL RESPONSES TO PHQ QUESTIONS 1-9: 0
SUM OF ALL RESPONSES TO PHQ QUESTIONS 1-9: 0
SUM OF ALL RESPONSES TO PHQ9 QUESTIONS 1 & 2: 0
SUM OF ALL RESPONSES TO PHQ QUESTIONS 1-9: 0
SUM OF ALL RESPONSES TO PHQ QUESTIONS 1-9: 0

## 2025-01-07 ASSESSMENT — VISUAL ACUITY: OU: 1

## 2025-01-07 NOTE — PROGRESS NOTES
Subjective:      Patient ID: Norma Carrizales is a 64 y.o. female    Annual exam. Reviewed medical, surgical, social and family history.  Also reviewed current medications and allergies.  No PMB.  No GYN complaints.  Pap deferred.  Screening mammogram ordered.  Monthly SBE encouraged.  Dexa scan ordered per protocol.  Screening colonoscopy recommended per routine.  F/U annual exam or prn.    Vitals:  /68   Ht 1.473 m (4' 10\")   Wt 74.4 kg (164 lb)   BMI 34.28 kg/m²   Past Medical History:   Diagnosis Date    Hypertension      Past Surgical History:   Procedure Laterality Date    KNEE ARTHROSCOPY Left 08/06/2018    TOTAL KNEE ARTHROPLASTY Left 03/08/2021    TOTAL KNEE ARTHROPLASTY Right 02/17/2023     Allergies:  Patient has no known allergies.  Current Outpatient Medications   Medication Sig Dispense Refill    Apoaequorin (PREVAGEN) 10 MG CAPS Take by mouth      ferrous sulfate (IRON 325) 325 (65 Fe) MG tablet Take 1 tablet by mouth daily (with breakfast)      atorvastatin (LIPITOR) 20 MG tablet TAKE ONE TABLET BY MOUTH EVERY DAY  5    lisinopril-hydrochlorothiazide (PRINZIDE;ZESTORETIC) 20-12.5 MG per tablet Take 1 tablet by mouth       No current facility-administered medications for this visit.     Social History     Socioeconomic History    Marital status:      Spouse name: Not on file    Number of children: Not on file    Years of education: Not on file    Highest education level: Not on file   Occupational History    Not on file   Tobacco Use    Smoking status: Never    Smokeless tobacco: Never   Substance and Sexual Activity    Alcohol use: No    Drug use: No    Sexual activity: Yes   Other Topics Concern    Not on file   Social History Narrative    Not on file     Social Determinants of Health     Financial Resource Strain: Low Risk  (1/7/2025)    Overall Financial Resource Strain (CARDIA)     Difficulty of Paying Living Expenses: Not hard at all   Food Insecurity: No Food Insecurity

## 2025-01-07 NOTE — PROGRESS NOTES
The patient was asked if she would like a chaperone present for her intimate exam. She  Declined the chaperone. Gavin Candelario CMA (AAMA)

## 2025-01-10 LAB
HPV HR 12 DNA SPEC QL NAA+PROBE: DETECTED
HPV16 DNA SPEC QL NAA+PROBE: NOT DETECTED
HPV16+18+H RISK 12 DNA SPEC-IMP: ABNORMAL
HPV18 DNA SPEC QL NAA+PROBE: NOT DETECTED

## 2025-01-13 ENCOUNTER — HOSPITAL ENCOUNTER (OUTPATIENT)
Dept: WOMENS IMAGING | Age: 65
Discharge: HOME | End: 2025-01-15
Payer: MEDICARE

## 2025-01-13 VITALS — HEIGHT: 58 IN | BODY MASS INDEX: 34.29 KG/M2

## 2025-01-13 DIAGNOSIS — Z12.31 SCREENING MAMMOGRAM FOR BREAST CANCER: ICD-10-CM

## 2025-01-13 PROCEDURE — 77063 BREAST TOMOSYNTHESIS BI: CPT

## 2025-01-29 DIAGNOSIS — F41.9 ANXIETY DISORDER, UNSPECIFIED TYPE: ICD-10-CM

## 2025-01-30 RX ORDER — VENLAFAXINE HYDROCHLORIDE 37.5 MG/1
37.5 CAPSULE, EXTENDED RELEASE ORAL
Qty: 90 CAPSULE | Refills: 1 | Status: SHIPPED | OUTPATIENT
Start: 2025-01-30

## 2025-02-06 ENCOUNTER — APPOINTMENT (OUTPATIENT)
Dept: CARDIOLOGY | Facility: CLINIC | Age: 65
End: 2025-02-06
Payer: MEDICARE

## 2025-02-15 DIAGNOSIS — E78.00 HYPERCHOLESTEROLEMIA: ICD-10-CM

## 2025-02-17 RX ORDER — ATORVASTATIN CALCIUM 20 MG/1
20 TABLET, FILM COATED ORAL DAILY
Qty: 90 TABLET | Refills: 0 | Status: SHIPPED | OUTPATIENT
Start: 2025-02-17

## 2025-02-18 ENCOUNTER — APPOINTMENT (OUTPATIENT)
Dept: NEUROLOGY | Facility: CLINIC | Age: 65
End: 2025-02-18
Payer: MEDICARE

## 2025-03-18 ENCOUNTER — TELEPHONE (OUTPATIENT)
Dept: NEUROLOGY | Facility: CLINIC | Age: 65
End: 2025-03-18

## 2025-03-18 ENCOUNTER — APPOINTMENT (OUTPATIENT)
Dept: NEUROLOGY | Facility: CLINIC | Age: 65
End: 2025-03-18
Payer: MEDICARE

## 2025-03-18 VITALS
DIASTOLIC BLOOD PRESSURE: 60 MMHG | WEIGHT: 165.8 LBS | HEART RATE: 82 BPM | HEIGHT: 58 IN | SYSTOLIC BLOOD PRESSURE: 114 MMHG | BODY MASS INDEX: 34.8 KG/M2

## 2025-03-18 DIAGNOSIS — F41.9 ANXIETY: ICD-10-CM

## 2025-03-18 DIAGNOSIS — F98.8 ATTENTION DEFICIT DISORDER, UNSPECIFIED TYPE: ICD-10-CM

## 2025-03-18 DIAGNOSIS — R56.9 OBSERVED SEIZURE-LIKE ACTIVITY (MULTI): ICD-10-CM

## 2025-03-18 DIAGNOSIS — G93.40 ENCEPHALOPATHY, UNSPECIFIED TYPE: Primary | ICD-10-CM

## 2025-03-18 DIAGNOSIS — Z51.81 ENCOUNTER FOR THERAPEUTIC DRUG LEVEL MONITORING: ICD-10-CM

## 2025-03-18 PROCEDURE — 3074F SYST BP LT 130 MM HG: CPT | Performed by: PSYCHIATRY & NEUROLOGY

## 2025-03-18 PROCEDURE — 99205 OFFICE O/P NEW HI 60 MIN: CPT | Performed by: PSYCHIATRY & NEUROLOGY

## 2025-03-18 PROCEDURE — 3078F DIAST BP <80 MM HG: CPT | Performed by: PSYCHIATRY & NEUROLOGY

## 2025-03-18 PROCEDURE — 1036F TOBACCO NON-USER: CPT | Performed by: PSYCHIATRY & NEUROLOGY

## 2025-03-18 PROCEDURE — 1159F MED LIST DOCD IN RCRD: CPT | Performed by: PSYCHIATRY & NEUROLOGY

## 2025-03-18 PROCEDURE — 1123F ACP DISCUSS/DSCN MKR DOCD: CPT | Performed by: PSYCHIATRY & NEUROLOGY

## 2025-03-18 PROCEDURE — 1160F RVW MEDS BY RX/DR IN RCRD: CPT | Performed by: PSYCHIATRY & NEUROLOGY

## 2025-03-18 PROCEDURE — 3008F BODY MASS INDEX DOCD: CPT | Performed by: PSYCHIATRY & NEUROLOGY

## 2025-03-18 RX ORDER — DEXMETHYLPHENIDATE HYDROCHLORIDE 5 MG/1
5 TABLET ORAL 2 TIMES DAILY
Qty: 30 TABLET | Refills: 0 | Status: SHIPPED | OUTPATIENT
Start: 2025-05-17 | End: 2025-06-16

## 2025-03-18 RX ORDER — DEXMETHYLPHENIDATE HYDROCHLORIDE 5 MG/1
5 TABLET ORAL 2 TIMES DAILY
Qty: 30 TABLET | Refills: 0 | Status: SHIPPED | OUTPATIENT
Start: 2025-04-17 | End: 2025-05-17

## 2025-03-18 RX ORDER — DEXMETHYLPHENIDATE HYDROCHLORIDE 5 MG/1
5 TABLET ORAL 2 TIMES DAILY
Qty: 30 TABLET | Refills: 0 | Status: SHIPPED | OUTPATIENT
Start: 2025-03-18 | End: 2025-04-17

## 2025-03-18 RX ORDER — DEXMETHYLPHENIDATE HYDROCHLORIDE 10 MG/1
10 CAPSULE, EXTENDED RELEASE ORAL DAILY
Qty: 30 CAPSULE | Refills: 0 | Status: SHIPPED | OUTPATIENT
Start: 2025-03-18 | End: 2025-04-17

## 2025-03-18 RX ORDER — DEXMETHYLPHENIDATE HYDROCHLORIDE 10 MG/1
10 CAPSULE, EXTENDED RELEASE ORAL DAILY
Qty: 30 CAPSULE | Refills: 0 | Status: SHIPPED | OUTPATIENT
Start: 2025-04-17 | End: 2025-05-17

## 2025-03-18 RX ORDER — MIRTAZAPINE 15 MG/1
15 TABLET, FILM COATED ORAL NIGHTLY
Qty: 30 TABLET | Refills: 3 | Status: SHIPPED | OUTPATIENT
Start: 2025-03-18

## 2025-03-18 RX ORDER — DEXMETHYLPHENIDATE HYDROCHLORIDE 10 MG/1
10 CAPSULE, EXTENDED RELEASE ORAL DAILY
Qty: 30 CAPSULE | Refills: 0 | Status: SHIPPED | OUTPATIENT
Start: 2025-05-17 | End: 2025-06-16

## 2025-03-18 ASSESSMENT — ENCOUNTER SYMPTOMS
DECREASED CONCENTRATION: 1
LIGHT-HEADEDNESS: 0
VOMITING: 0
AGITATION: 0
DIFFICULTY URINATING: 0
CONFUSION: 1
HYPERACTIVE: 0
NAUSEA: 0
HEADACHES: 0
SPEECH DIFFICULTY: 0
SHORTNESS OF BREATH: 0
TROUBLE SWALLOWING: 0
SINUS PRESSURE: 0
BRUISES/BLEEDS EASILY: 0
UNEXPECTED WEIGHT CHANGE: 0
FEVER: 0
PALPITATIONS: 0
JOINT SWELLING: 0
NECK PAIN: 0
TREMORS: 0
ADENOPATHY: 0
SLEEP DISTURBANCE: 1
ABDOMINAL PAIN: 0
NUMBNESS: 0
FACIAL ASYMMETRY: 0
HALLUCINATIONS: 0
DIZZINESS: 0
BACK PAIN: 0
SEIZURES: 0
NECK STIFFNESS: 0
WEAKNESS: 0
FREQUENCY: 0
DYSPHORIC MOOD: 1
ARTHRALGIAS: 0
WHEEZING: 0
EYE PAIN: 0
PHOTOPHOBIA: 0
COUGH: 0
FATIGUE: 0

## 2025-03-18 ASSESSMENT — PATIENT HEALTH QUESTIONNAIRE - PHQ9
2. FEELING DOWN, DEPRESSED OR HOPELESS: SEVERAL DAYS
1. LITTLE INTEREST OR PLEASURE IN DOING THINGS: SEVERAL DAYS
SUM OF ALL RESPONSES TO PHQ9 QUESTIONS 1 & 2: 2
10. IF YOU CHECKED OFF ANY PROBLEMS, HOW DIFFICULT HAVE THESE PROBLEMS MADE IT FOR YOU TO DO YOUR WORK, TAKE CARE OF THINGS AT HOME, OR GET ALONG WITH OTHER PEOPLE: NOT DIFFICULT AT ALL

## 2025-03-18 NOTE — TELEPHONE ENCOUNTER
PA approval for Dexmethylphenidate 5 mg faxed to  at [phone].   Approved  Prior Authorization Portal   Prior authorization approved  Payer: [facility] and [employer] - Medicare Case ID: [id number]  Note from payer: PA Case: [id number], Status: Approved, Coverage Starts on: 1/1/2025 12:00:00 AM, Coverage Ends on: 3/18/2026 12:00:00 AM.  Approval Details    Authorized from January 1, 2025 to March 18, 2026  
22-Nov-2024 11:52

## 2025-03-18 NOTE — PROGRESS NOTES
Maryan Carey  65 y.o.       SUBJECTIVE  Maryan is a 65-year-old young lady who was seen today for evaluation of her possible attention deficit disorder difficulty at work and home.  She has been struggling throughout her life and did dropped out of 11th grade and got GED.  She is having hard time with paying attention constantly getting her work done.  At times she does zoned out.  Today her physical and neurological exam is normal.  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  I would like to start her on Remeron 15 mg at bedtime and have discussed sleep hygiene behavior modification and controlled substance policy and depending on how she does I might make future recommendation when she comes back to see me in 6 to 8 weeks.  I have scheduled to have an EEG to look for any epileptiform discharges.    As you recall, Maryan is a 65-year-old young lady who has been having difficulty since early childhood but according to her she is struggled throughout elementary middle and high school to the point she dropped out of high school and got her GED    She had a hard time with her attention span concentration and focusing and getting her work done.  On the diagnostic checklist she had 7 out of 9 for inattentive and 2 out of 9 for hyperactive type of ADHD    Her birth development past medical history is unremarkable for major medical problems except for hypertension    She lives with her family and has 2 sons and a daughter and 1 son has ADHD and there is a questionable history of ADHD on her side of the family    She works part-time and denies any smoking alcohol and substance abuse.      Due to technical limitations of voice recognition and human error, this note may not accurately reflect the care of the patient.    Review of Systems   Constitutional:   Negative for fatigue, fever and unexpected weight change.   HENT:  Negative for dental problem, ear pain, hearing loss, sinus pressure, tinnitus and trouble swallowing.    Eyes:  Negative for photophobia, pain and visual disturbance.   Respiratory:  Negative for cough, shortness of breath and wheezing.    Cardiovascular:  Negative for chest pain, palpitations and leg swelling.   Gastrointestinal:  Negative for abdominal pain, nausea and vomiting.   Genitourinary:  Negative for difficulty urinating, enuresis and frequency.   Musculoskeletal:  Negative for arthralgias, back pain, joint swelling, neck pain and neck stiffness.   Skin:  Negative for pallor and rash.   Allergic/Immunologic: Negative for food allergies.   Neurological:  Negative for dizziness, tremors, seizures, syncope, facial asymmetry, speech difficulty, weakness, light-headedness, numbness and headaches.   Hematological:  Negative for adenopathy. Does not bruise/bleed easily.   Psychiatric/Behavioral:  Positive for confusion, decreased concentration, dysphoric mood and sleep disturbance. Negative for agitation, behavioral problems and hallucinations. The patient is not hyperactive.         Patient Active Problem List   Diagnosis    Anxiety disorder    Benign essential hypertension    Hypercholesterolemia    Primary osteoarthritis of right knee    Seasonal allergies    Vitamin D deficiency    Class 1 obesity due to excess calories with serious comorbidity and body mass index (BMI) of 32.0 to 32.9 in adult    Primary insomnia    Anemia     History reviewed. No pertinent past medical history.  Past Surgical History:   Procedure Laterality Date    OTHER SURGICAL HISTORY  10/19/2021    Knee arthroscopy    OTHER SURGICAL HISTORY  2019    Cholecystectomy laparoscopic    OTHER SURGICAL HISTORY  2019     section    XR CHEST PACEMAKER WITH FLUORO  2020    XR CHEST PACEMAKER WITH FLUORO 2020 [facility]       reports that  "she has never smoked. She has never used smokeless tobacco. She reports that she does not currently use alcohol. She reports that she does not use drugs.    /60 (BP Location: Left arm, Patient Position: Sitting, BP Cuff Size: Large adult)   Pulse 82   Ht 1.473 m (4' 10\")   Wt 75.2 kg (165 lb 12.8 oz)   BMI 34.65 kg/m²     OBJECTIVE  Physical Exam/Neurological Exam   Constitutional: General appearance: no acute distress   Auscultation of Heart: Regular rate and rhythm, no murmurs, normal S1 and S2.   Carotid Arteries: Intact without any bruits.   Neck is supple.   No lymph adenopathy.   Peripheral Vascular Exam: Pulses +2 and equal in all extremities. No swelling, varicosities, edema or tenderness to palpations.    Abdomen is soft, nondistended. No organomegaly.  Mental status: The patient was in no distress, alert, interactive and cooperative. Affect is appropriate.   Orientation: oriented to person, oriented to place and oriented to time.   Memory: recent memory intact and remote memory intact.   Attention: normal attention span and normal concentrating ability.   Language: normal comprehension and no difficulty naming common objects.   Fund of knowledge: Patient displays adequate knowledge of current events, adequate fund of knowledge regarding past history and adequate fund of knowledge regarding vocabulary.   Eyes: The ophthalmoscopic examination was normal. The fundi are visualized with normal disc margins and without.  Cranial nerve II: Visual fields full to confrontation.   Cranial nerves III, IV, and VI: Pupils round, equally reactive to light; no ptosis. EOMs intact. No nystagmus.   Cranial Nerve V: Facial sensation intact bilaterally.   Cranial nerve VII: Normal and symmetric facial strength.   Cranial nerve VIII: Hearing is intact bilaterally to finger rub / whisper.   Cranial nerves IX and X: Palate elevates symmetrically.   Cranial nerve XI: Shoulder shrug and neck rotation strength are " intact.   Cranial nerve XII: Tongue midline with normal strength.   Motor: Motor exam was normal. Muscle bulk was normal in both upper and lower extremities. Muscle tone was normal in both upper and lower extremities. Muscle strength was 5/5 throughout. no abnormal or adventitious movements were present.   Deep Tendon Reflexes: left biceps 2+ , right biceps 2+, left triceps 2+, right triceps 2+, left brachioradialis 2+, right brachioradialis 2+, left patella 2+, right patella 2+, left ankle jerk 2+, right ankle jerk 2+   Plantar Reflex: Toes downgoing to plantar stimulation on the left. Toes downgoing to plantar stimulation on the right.   Sensory Exam: Normal to light touch.   Coordination: There is no limb dystaxia and rapid alternating movements are intact.  Gait: Gait is normal without spasticity, ataxia or bradykinesia. Stance is stable with a negative Romberg.      ASSESSMENT/PLAN  Diagnoses and all orders for this visit:  Encephalopathy, unspecified type  Attention deficit disorder, unspecified type  -     Referral to Neurology  -     dexmethylphenidate (Focalin) 5 mg tablet; Take 1 tablet (5 mg) by mouth 2 times a day.  -     dexmethylphenidate (Focalin) 5 mg tablet; Take 1 tablet (5 mg) by mouth 2 times a day. Do not fill before April 17, 2025.  -     dexmethylphenidate (Focalin) 5 mg tablet; Take 1 tablet (5 mg) by mouth 2 times a day. Do not fill before May 17, 2025.  -     dexmethylphenidate XR (Focalin XR) 10 mg 24 hr capsule; Take 1 capsule (10 mg) by mouth once daily. Do not crush, chew, or split.  -     dexmethylphenidate XR (Focalin XR) 10 mg 24 hr capsule; Take 1 capsule (10 mg) by mouth once daily. Do not crush, chew, or split. Do not fill before April 17, 2025.  -     dexmethylphenidate XR (Focalin XR) 10 mg 24 hr capsule; Take 1 capsule (10 mg) by mouth once daily. Do not crush, chew, or split. Do not fill before May 17, 2025.  -     Drug Screen, Urine With Reflex to Confirmation  Anxiety  -      mirtazapine (Remeron) 15 mg tablet; Take 1 tablet (15 mg) by mouth once daily at bedtime.  Observed seizure-like activity (Multi)  -     EEG; Future  Encounter for therapeutic drug level monitoring  -     Drug Screen, Urine With Reflex to Confirmation        Germain Hyde MD  3/18/2025  11:53 AM

## 2025-03-18 NOTE — TELEPHONE ENCOUNTER
PA approval for dexmethylphenidate XR (Focalin XR) 10 mg 24 hr capsule faxed to  at [phone].  Approved   3/18/2025  1:49 PM  Appeal supported: No   Note from payer: PA Case: [id number], Status: Approved, Coverage Starts on: 1/1/2025 12:00:00 AM, Coverage Ends on: 3/18/2026 12:00:00 AM.   Payer: [employer] - Medicare Case ID: [id number]

## 2025-03-18 NOTE — TELEPHONE ENCOUNTER
PA request recieved via covermymeds for dexmethylphenidate XR (Focalin XR) 10 mg 24 hr capsule. PA initiated via Epic.

## 2025-03-19 LAB
AMPHETAMINES UR QL: NEGATIVE NG/ML
BARBITURATES UR QL: NEGATIVE NG/ML
BENZODIAZ UR QL: NEGATIVE NG/ML
BZE UR QL: NEGATIVE NG/ML
CREAT UR-MCNC: 118.1 MG/DL
METHADONE UR QL: NEGATIVE NG/ML
OPIATES UR QL: NEGATIVE NG/ML
OXIDANTS UR QL: NEGATIVE MCG/ML
OXYCODONE UR QL: NEGATIVE NG/ML
PCP UR QL: NEGATIVE NG/ML
PH UR: 5.7 [PH] (ref 4.5–9)
QUEST NOTES AND COMMENTS: NORMAL
THC UR QL: NEGATIVE NG/ML

## 2025-03-25 ENCOUNTER — APPOINTMENT (OUTPATIENT)
Dept: PRIMARY CARE | Facility: CLINIC | Age: 65
End: 2025-03-25
Payer: MEDICARE

## 2025-04-04 ENCOUNTER — TELEPHONE (OUTPATIENT)
Age: 65
End: 2025-04-04

## 2025-04-09 ENCOUNTER — APPOINTMENT (OUTPATIENT)
Dept: NEUROLOGY | Facility: HOSPITAL | Age: 65
End: 2025-04-09
Payer: MEDICARE

## 2025-05-02 DIAGNOSIS — I10 BENIGN ESSENTIAL HYPERTENSION: ICD-10-CM

## 2025-05-02 RX ORDER — LISINOPRIL AND HYDROCHLOROTHIAZIDE 12.5; 2 MG/1; MG/1
1 TABLET ORAL DAILY
Qty: 90 TABLET | Refills: 0 | Status: SHIPPED | OUTPATIENT
Start: 2025-05-02

## 2025-05-10 LAB
25(OH)D3+25(OH)D2 SERPL-MCNC: 52 NG/ML (ref 30–100)
ALBUMIN SERPL-MCNC: 4.2 G/DL (ref 3.6–5.1)
ALP SERPL-CCNC: 132 U/L (ref 37–153)
ALT SERPL-CCNC: 12 U/L (ref 6–29)
ANION GAP SERPL CALCULATED.4IONS-SCNC: 8 MMOL/L (CALC) (ref 7–17)
AST SERPL-CCNC: 16 U/L (ref 10–35)
BASOPHILS # BLD AUTO: 22 CELLS/UL (ref 0–200)
BASOPHILS NFR BLD AUTO: 0.5 %
BILIRUB SERPL-MCNC: 0.4 MG/DL (ref 0.2–1.2)
BUN SERPL-MCNC: 10 MG/DL (ref 7–25)
CALCIUM SERPL-MCNC: 9.6 MG/DL (ref 8.6–10.4)
CHLORIDE SERPL-SCNC: 101 MMOL/L (ref 98–110)
CHOLEST SERPL-MCNC: 141 MG/DL
CHOLEST/HDLC SERPL: 3.3 (CALC)
CO2 SERPL-SCNC: 32 MMOL/L (ref 20–32)
CREAT SERPL-MCNC: 0.89 MG/DL (ref 0.5–1.05)
EGFRCR SERPLBLD CKD-EPI 2021: 71 ML/MIN/1.73M2
EOSINOPHIL # BLD AUTO: 129 CELLS/UL (ref 15–500)
EOSINOPHIL NFR BLD AUTO: 3 %
ERYTHROCYTE [DISTWIDTH] IN BLOOD BY AUTOMATED COUNT: 12.9 % (ref 11–15)
GLUCOSE SERPL-MCNC: 97 MG/DL (ref 65–99)
HCT VFR BLD AUTO: 34.1 % (ref 35–45)
HDLC SERPL-MCNC: 43 MG/DL
HGB BLD-MCNC: 11.4 G/DL (ref 11.7–15.5)
LDLC SERPL CALC-MCNC: 78 MG/DL (CALC)
LYMPHOCYTES # BLD AUTO: 1453 CELLS/UL (ref 850–3900)
LYMPHOCYTES NFR BLD AUTO: 33.8 %
MCH RBC QN AUTO: 27.1 PG (ref 27–33)
MCHC RBC AUTO-ENTMCNC: 33.4 G/DL (ref 32–36)
MCV RBC AUTO: 81 FL (ref 80–100)
MONOCYTES # BLD AUTO: 340 CELLS/UL (ref 200–950)
MONOCYTES NFR BLD AUTO: 7.9 %
NEUTROPHILS # BLD AUTO: 2356 CELLS/UL (ref 1500–7800)
NEUTROPHILS NFR BLD AUTO: 54.8 %
NONHDLC SERPL-MCNC: 98 MG/DL (CALC)
PLATELET # BLD AUTO: 290 THOUSAND/UL (ref 140–400)
PMV BLD REES-ECKER: 12.2 FL (ref 7.5–12.5)
POTASSIUM SERPL-SCNC: 4.3 MMOL/L (ref 3.5–5.3)
PROT SERPL-MCNC: 7.1 G/DL (ref 6.1–8.1)
RBC # BLD AUTO: 4.21 MILLION/UL (ref 3.8–5.1)
SODIUM SERPL-SCNC: 141 MMOL/L (ref 135–146)
TRIGL SERPL-MCNC: 122 MG/DL
TSH SERPL-ACNC: 1.9 MIU/L (ref 0.4–4.5)
VIT B12 SERPL-MCNC: 946 PG/ML (ref 200–1100)
WBC # BLD AUTO: 4.3 THOUSAND/UL (ref 3.8–10.8)

## 2025-05-12 ENCOUNTER — APPOINTMENT (OUTPATIENT)
Dept: PRIMARY CARE | Facility: CLINIC | Age: 65
End: 2025-05-12
Payer: MEDICARE

## 2025-05-12 VITALS
HEART RATE: 80 BPM | WEIGHT: 150 LBS | BODY MASS INDEX: 31.49 KG/M2 | SYSTOLIC BLOOD PRESSURE: 105 MMHG | TEMPERATURE: 96 F | HEIGHT: 58 IN | DIASTOLIC BLOOD PRESSURE: 68 MMHG

## 2025-05-12 DIAGNOSIS — E66.811 CLASS 1 OBESITY DUE TO EXCESS CALORIES WITHOUT SERIOUS COMORBIDITY WITH BODY MASS INDEX (BMI) OF 31.0 TO 31.9 IN ADULT: ICD-10-CM

## 2025-05-12 DIAGNOSIS — Z12.11 SCREENING FOR COLORECTAL CANCER: ICD-10-CM

## 2025-05-12 DIAGNOSIS — Z00.00 ROUTINE GENERAL MEDICAL EXAMINATION AT HEALTH CARE FACILITY: Primary | ICD-10-CM

## 2025-05-12 DIAGNOSIS — E66.09 CLASS 1 OBESITY DUE TO EXCESS CALORIES WITHOUT SERIOUS COMORBIDITY WITH BODY MASS INDEX (BMI) OF 31.0 TO 31.9 IN ADULT: ICD-10-CM

## 2025-05-12 DIAGNOSIS — Z12.12 SCREENING FOR COLORECTAL CANCER: ICD-10-CM

## 2025-05-12 PROCEDURE — 1036F TOBACCO NON-USER: CPT | Performed by: INTERNAL MEDICINE

## 2025-05-12 PROCEDURE — 99397 PER PM REEVAL EST PAT 65+ YR: CPT | Performed by: INTERNAL MEDICINE

## 2025-05-12 PROCEDURE — 3078F DIAST BP <80 MM HG: CPT | Performed by: INTERNAL MEDICINE

## 2025-05-12 PROCEDURE — 1159F MED LIST DOCD IN RCRD: CPT | Performed by: INTERNAL MEDICINE

## 2025-05-12 PROCEDURE — 1170F FXNL STATUS ASSESSED: CPT | Performed by: INTERNAL MEDICINE

## 2025-05-12 PROCEDURE — 3074F SYST BP LT 130 MM HG: CPT | Performed by: INTERNAL MEDICINE

## 2025-05-12 PROCEDURE — G0439 PPPS, SUBSEQ VISIT: HCPCS | Performed by: INTERNAL MEDICINE

## 2025-05-12 PROCEDURE — 3008F BODY MASS INDEX DOCD: CPT | Performed by: INTERNAL MEDICINE

## 2025-05-12 ASSESSMENT — ACTIVITIES OF DAILY LIVING (ADL)
HEARING - LEFT EAR: FUNCTIONAL
PILL BOX USED: NO
USING TELEPHONE: INDEPENDENT
GROCERY SHOPPING: INDEPENDENT
ADEQUATE_TO_COMPLETE_ADL: YES
BATHING: INDEPENDENT
NEEDS ASSISTANCE WITH FOOD: INDEPENDENT
GROOMING: INDEPENDENT
TOILETING: INDEPENDENT
TOILETING: INDEPENDENT
STIL DRIVING: YES
EATING: INDEPENDENT
PATIENT'S MEMORY ADEQUATE TO SAFELY COMPLETE DAILY ACTIVITIES?: YES
TAKING MEDICATION: INDEPENDENT
USING TRANSPORTATION: INDEPENDENT
FEEDING: INDEPENDENT
DRESSING: INDEPENDENT
MANAGING FINANCES: INDEPENDENT
HEARING - RIGHT EAR: FUNCTIONAL
PREPARING MEALS: INDEPENDENT
DRESSING YOURSELF: INDEPENDENT
JUDGMENT_ADEQUATE_SAFELY_COMPLETE_DAILY_ACTIVITIES: YES
WALKS IN HOME: INDEPENDENT
FEEDING YOURSELF: INDEPENDENT
BATHING: INDEPENDENT
ADEQUATE_TO_COMPLETE_ADL: YES
JUDGMENT_ADEQUATE_SAFELY_COMPLETE_DAILY_ACTIVITIES: YES

## 2025-05-12 ASSESSMENT — PATIENT HEALTH QUESTIONNAIRE - PHQ9
2. FEELING DOWN, DEPRESSED OR HOPELESS: NOT AT ALL
1. LITTLE INTEREST OR PLEASURE IN DOING THINGS: NOT AT ALL
SUM OF ALL RESPONSES TO PHQ9 QUESTIONS 1 AND 2: 0
1. LITTLE INTEREST OR PLEASURE IN DOING THINGS: NOT AT ALL
2. FEELING DOWN, DEPRESSED OR HOPELESS: NOT AT ALL
SUM OF ALL RESPONSES TO PHQ9 QUESTIONS 1 AND 2: 0

## 2025-05-12 ASSESSMENT — ANXIETY QUESTIONNAIRES
7. FEELING AFRAID AS IF SOMETHING AWFUL MIGHT HAPPEN: NOT AT ALL
3. WORRYING TOO MUCH ABOUT DIFFERENT THINGS: NOT AT ALL
6. BECOMING EASILY ANNOYED OR IRRITABLE: NOT AT ALL
5. BEING SO RESTLESS THAT IT IS HARD TO SIT STILL: NOT AT ALL
2. NOT BEING ABLE TO STOP OR CONTROL WORRYING: NOT AT ALL
1. FEELING NERVOUS, ANXIOUS, OR ON EDGE: SEVERAL DAYS
4. TROUBLE RELAXING: SEVERAL DAYS
GAD7 TOTAL SCORE: 2

## 2025-05-12 ASSESSMENT — ENCOUNTER SYMPTOMS
ARTHRALGIAS: 0
NERVOUS/ANXIOUS: 0
CARDIOVASCULAR NEGATIVE: 1
OCCASIONAL FEELINGS OF UNSTEADINESS: 0
RESPIRATORY NEGATIVE: 1
DIZZINESS: 0
DEPRESSION: 0
WEAKNESS: 0
LOSS OF SENSATION IN FEET: 0
GASTROINTESTINAL NEGATIVE: 1
CONSTITUTIONAL NEGATIVE: 1

## 2025-05-12 ASSESSMENT — COLUMBIA-SUICIDE SEVERITY RATING SCALE - C-SSRS
6. HAVE YOU EVER DONE ANYTHING, STARTED TO DO ANYTHING, OR PREPARED TO DO ANYTHING TO END YOUR LIFE?: NO
2. HAVE YOU ACTUALLY HAD ANY THOUGHTS OF KILLING YOURSELF?: NO
1. IN THE PAST MONTH, HAVE YOU WISHED YOU WERE DEAD OR WISHED YOU COULD GO TO SLEEP AND NOT WAKE UP?: NO

## 2025-05-12 NOTE — PROGRESS NOTES
Subjective   Reason for Visit: Maryan Carey is an 65 y.o. female here for a Medicare Wellness visit.     Past Medical, Surgical, and Family History reviewed and updated in chart.    Reviewed all medications by prescribing practitioner or clinical pharmacist (such as prescriptions, OTCs, herbal therapies and supplements) and documented in the medical record.    65-year-old female patient was seen for wellness exam.  Patient kept having panic episodes, it is not very frequent.  Patient was seen by neurology and was offered a treatment of ADD, she could not take that therapy she says she does not need it and she is not going to follow-up with neurology, patient has lost some weight and I see that neurology has started patient on mirtazapine treatment, patient is not taking it and I told her that mirtazapine is notorious to cause weight gain she says she will not take it.  She is on venlafaxine treatment, dosage can be increased if needed.  Otherwise she stays on antihypertensives and statins.  Recent laboratories were reviewed and everything is looking great, she has a knee replacement, she has a back surgery, she has a excellent family support, she is homebound she has her own domiciliary set up stays with her spouse, she is always afraid about getting dementia because her mother had it, as much as I know this patient there is no indication for dementia yet and so far.  She is up-to-date with her GYN care, her breast care was reviewed, colon cancer screening test is not up to date, she is very happy to see some weight loss BMI has fallen down to 31.        Patient Care Team:  Wyatt Peterson MD as PCP - General  Wyatt Peterson MD as PCP - [employer] PCP  Wyatt Peterson MD as PCP - [facility] PCP  Janes Trinh MD as Consulting Physician (Hematology and Oncology)  TRACE Navarro-CNP as Nurse Practitioner (Cardiology)  Germain Hyde MD as Consulting Physician (Neurology)     Review  "of Systems   Constitutional: Negative.         Wt loss   Respiratory: Negative.     Cardiovascular: Negative.    Gastrointestinal: Negative.    Musculoskeletal:  Negative for arthralgias.   Neurological:  Negative for dizziness and weakness.   Psychiatric/Behavioral:  The patient is not nervous/anxious.         Seldom panic attacks       Objective   Vitals:  Temp 35.6 °C (96 °F) (Temporal)   Ht 1.473 m (4' 10\")   Wt 68 kg (150 lb)   BMI 31.35 kg/m²       Physical Exam  Vitals reviewed.   Constitutional:       Appearance: Normal appearance. She is obese.   HENT:      Head: Normocephalic.   Eyes:      Conjunctiva/sclera: Conjunctivae normal.   Cardiovascular:      Rate and Rhythm: Normal rate and regular rhythm.      Pulses: Normal pulses.   Pulmonary:      Effort: Pulmonary effort is normal.      Breath sounds: Normal breath sounds.   Abdominal:      Palpations: Abdomen is soft.   Musculoskeletal:         General: Normal range of motion.   Skin:     General: Skin is warm and dry.   Neurological:      General: No focal deficit present.   Psychiatric:         Mood and Affect: Mood normal.       Assessment & Plan  Class 1 obesity due to excess calories without serious comorbidity with body mass index (BMI) of 31.0 to 31.9 in adult         Routine general medical examination at health care facility    Orders:    1 Year Follow Up In Primary Care - Wellness Exam; Future    Screening for colorectal cancer    Orders:    Colonoscopy Screening; Average Risk Patient; Future    Needs colon cancer screening, it has been highly due, she has been referred in the past also, another referral was given.  She says that what is good medicine for panic attack I said that lets increase Effexor to 75 mg, she says she does not want to try it she says she can manage it, episodes are infrequent.  She does not have a living will, understand the importance of living will, living will is important part of our healthcare planning including " power of .  No depression, no anhedonia, no cognitive impairment.  ADLs and IADLs are intact.  In review of recent laboratories everything looks well hemoglobin has improved.  Can work on more weight loss, dietary modification, stress reduction, 7 hours of sleep, avoidance of concentrated juices, carbonated beverages.  As she is not intending to follow-up with neurology she will not take any therapy for ADD and she says she can manage without it.  Today I gave her colonoscopy referral.  During the process of wellness exam complete physical exam was done.  Patient will come here periodically for checkup and follow-ups.  Previously we have done coronary calcium score scan done, stress reduction, light to moderate low impact exercises, networking and and enjoying with the family is a very important part of our wellness for a longstanding future to come.  Any issues or concerns will be directed to versus otherwise follow-up in 4 months.

## 2025-05-19 ENCOUNTER — APPOINTMENT (OUTPATIENT)
Dept: NEUROLOGY | Facility: CLINIC | Age: 65
End: 2025-05-19
Payer: MEDICARE

## 2025-06-02 DIAGNOSIS — E78.00 HYPERCHOLESTEROLEMIA: ICD-10-CM

## 2025-06-02 RX ORDER — ATORVASTATIN CALCIUM 20 MG/1
20 TABLET, FILM COATED ORAL DAILY
Qty: 90 TABLET | Refills: 3 | Status: SHIPPED | OUTPATIENT
Start: 2025-06-02

## 2025-06-23 ENCOUNTER — APPOINTMENT (OUTPATIENT)
Dept: PRIMARY CARE | Facility: CLINIC | Age: 65
End: 2025-06-23
Payer: MEDICARE

## 2025-06-23 VITALS
DIASTOLIC BLOOD PRESSURE: 67 MMHG | BODY MASS INDEX: 31.49 KG/M2 | HEIGHT: 58 IN | WEIGHT: 150 LBS | SYSTOLIC BLOOD PRESSURE: 110 MMHG | TEMPERATURE: 96 F | HEART RATE: 78 BPM

## 2025-06-23 DIAGNOSIS — E66.09 CLASS 1 OBESITY DUE TO EXCESS CALORIES WITH SERIOUS COMORBIDITY AND BODY MASS INDEX (BMI) OF 31.0 TO 31.9 IN ADULT: Primary | ICD-10-CM

## 2025-06-23 DIAGNOSIS — E66.811 CLASS 1 OBESITY DUE TO EXCESS CALORIES WITH SERIOUS COMORBIDITY AND BODY MASS INDEX (BMI) OF 31.0 TO 31.9 IN ADULT: Primary | ICD-10-CM

## 2025-06-23 DIAGNOSIS — I10 BENIGN ESSENTIAL HYPERTENSION: ICD-10-CM

## 2025-06-23 PROCEDURE — G2211 COMPLEX E/M VISIT ADD ON: HCPCS | Performed by: INTERNAL MEDICINE

## 2025-06-23 PROCEDURE — 1159F MED LIST DOCD IN RCRD: CPT | Performed by: INTERNAL MEDICINE

## 2025-06-23 PROCEDURE — 3074F SYST BP LT 130 MM HG: CPT | Performed by: INTERNAL MEDICINE

## 2025-06-23 PROCEDURE — G8433 SCR FOR DEP NOT CPT DOC RSN: HCPCS | Performed by: INTERNAL MEDICINE

## 2025-06-23 PROCEDURE — 3008F BODY MASS INDEX DOCD: CPT | Performed by: INTERNAL MEDICINE

## 2025-06-23 PROCEDURE — 99213 OFFICE O/P EST LOW 20 MIN: CPT | Performed by: INTERNAL MEDICINE

## 2025-06-23 PROCEDURE — 1160F RVW MEDS BY RX/DR IN RCRD: CPT | Performed by: INTERNAL MEDICINE

## 2025-06-23 PROCEDURE — 3078F DIAST BP <80 MM HG: CPT | Performed by: INTERNAL MEDICINE

## 2025-06-23 PROCEDURE — 1036F TOBACCO NON-USER: CPT | Performed by: INTERNAL MEDICINE

## 2025-06-23 ASSESSMENT — ENCOUNTER SYMPTOMS
FATIGUE: 0
CARDIOVASCULAR NEGATIVE: 1
LOSS OF SENSATION IN FEET: 0
COUGH: 0
RESPIRATORY NEGATIVE: 1
NUMBNESS: 0
DEPRESSION: 0
ABDOMINAL PAIN: 0
NEUROLOGICAL NEGATIVE: 1
ARTHRALGIAS: 0
NAUSEA: 0
ANOREXIA: 0
APPETITE CHANGE: 0
WEAKNESS: 0
OCCASIONAL FEELINGS OF UNSTEADINESS: 0
GASTROINTESTINAL NEGATIVE: 1
VOMITING: 0

## 2025-06-23 ASSESSMENT — PATIENT HEALTH QUESTIONNAIRE - PHQ9
2. FEELING DOWN, DEPRESSED OR HOPELESS: NOT AT ALL
1. LITTLE INTEREST OR PLEASURE IN DOING THINGS: NOT AT ALL
SUM OF ALL RESPONSES TO PHQ9 QUESTIONS 1 AND 2: 0

## 2025-06-23 NOTE — PROGRESS NOTES
Subjective   Patient ID: Maryan Carey is a 65 y.o. female who presents for Follow-up (Discuss weightloss).  Other  This is a recurrent (Wt loss discussion) problem. The current episode started more than 1 month ago. The problem has been unchanged. Pertinent negatives include no abdominal pain, anorexia, arthralgias, coughing, fatigue, nausea, numbness, vomiting or weakness. Exacerbated by: fluctuating BMI. Treatments tried: last year adipex. Improvement on treatment: last yr lost 12 lbs.   Hypertension  This is a chronic problem. The current episode started more than 1 year ago. The problem has been resolved since onset. The problem is controlled. Associated symptoms include anxiety. Pertinent negatives include no blurred vision, chest pain, peripheral edema or shortness of breath. Risk factors for coronary artery disease include obesity and post-menopausal state. Past treatments include ACE inhibitors and diuretics. The current treatment provides moderate improvement. There are no compliance problems.  There is no history of angina or kidney disease. There is no history of chronic renal disease.     Past Medical History  Medical History[1]    Social History  Social History[2]    Family History   Family History[3]    Allergies:  RX Allergies[4]     Outpatient Medications:  Current Outpatient Medications   Medication Instructions    atorvastatin (LIPITOR) 20 mg, oral, Daily    cetirizine (ZyrTEC) 10 mg tablet 1 tablet, Nightly    lisinopriL-hydrochlorothiazide 20-12.5 mg tablet 1 tablet, oral, Daily    venlafaxine XR (EFFEXOR-XR) 37.5 mg, oral, Daily with breakfast        Review of Systems   Constitutional:  Negative for appetite change and fatigue.   Respiratory: Negative.  Negative for cough.    Cardiovascular: Negative.    Gastrointestinal: Negative.  Negative for abdominal pain, anorexia, nausea and vomiting.   Musculoskeletal:  Negative for arthralgias.   Neurological: Negative.  Negative for weakness and  "numbness.         Objective       Physical Exam  Vitals reviewed.   Constitutional:       Appearance: Normal appearance. She is obese.   HENT:      Head: Normocephalic.   Eyes:      Conjunctiva/sclera: Conjunctivae normal.   Cardiovascular:      Rate and Rhythm: Normal rate and regular rhythm.      Pulses: Normal pulses.   Pulmonary:      Effort: Pulmonary effort is normal.      Breath sounds: Normal breath sounds.   Abdominal:      Palpations: Abdomen is soft.   Musculoskeletal:         General: Normal range of motion.      Cervical back: Neck supple.   Skin:     General: Skin is warm and dry.   Neurological:      General: No focal deficit present.   Psychiatric:         Mood and Affect: Mood normal.     /67 (BP Location: Right arm, Patient Position: Sitting, BP Cuff Size: Adult)   Pulse 78   Temp 35.6 °C (96 °F) (Temporal)   Ht 1.473 m (4' 10\")   Wt 68 kg (150 lb)   BMI 31.35 kg/m²      Assessment/Plan   Problem List Items Addressed This Visit       Benign essential hypertension    Class 1 obesity due to excess calories with serious comorbidity and body mass index (BMI) of 31.0 to 31.9 in adult - Primary   Came because she is looking to have phentermine treatment again, it was given a year ago, we discussed the pros and cons of phentermine treatment, I told her that she is not that heavily obese that we want to take phentermine repeatedly, it can be given but it has some adverse effects pulmonary hypertension regurgitant valvular lesions and with a BMI of just about to be 31 she can try dietary modification and dietary modification as a bigger role here, consistent dietary modification and avoidance of simple refined concentrated carbohydrates high calorie fatty foods and supplementing with the proteins and complex carbohydrates and also looking for less dense calorie foods can help to lose weight on a ongoing basis but it has to be consistent.  Patient agreed, phentermine will not be given, she stays " on venlafaxine, BP readings are excellent, ideal weight could be 135 but not less than that I told her, she will be seen at scheduled appointment.  Patient's care has been provided by me on a longitudinal basis with the management of the chronic problems and acute problem if it arises on ongoing basis and colonoscopy is not done so insisted that please proceed with your colonoscopy referral was given last time and that is the most important screening test we have for cancer prevention.       [1] History reviewed. No pertinent past medical history.  [2]   Social History  Tobacco Use    Smoking status: Never    Smokeless tobacco: Never   Vaping Use    Vaping status: Never Used   Substance Use Topics    Alcohol use: Not Currently    Drug use: Never   [3]   Family History  Problem Relation Name Age of Onset    Dementia Mother      Hypertension Mother      Lung cancer Father          Mesothelioma of lung   [4]   Allergies  Allergen Reactions    Other Other

## 2025-06-24 ENCOUNTER — TELEPHONE (OUTPATIENT)
Dept: PRIMARY CARE | Facility: CLINIC | Age: 65
End: 2025-06-24
Payer: MEDICARE

## 2025-06-24 NOTE — TELEPHONE ENCOUNTER
Patient calling in to see if she can establish care with Dr. Shabazz.     Please review and call pt at [phone]

## 2025-06-27 DIAGNOSIS — F40.240 CLAUSTROPHOBIA: ICD-10-CM

## 2025-06-27 RX ORDER — ALPRAZOLAM 0.5 MG/1
TABLET ORAL
Qty: 2 TABLET | Refills: 0 | Status: SHIPPED | OUTPATIENT
Start: 2025-06-27

## 2025-08-12 ENCOUNTER — APPOINTMENT (OUTPATIENT)
Dept: ORTHOPEDIC SURGERY | Facility: CLINIC | Age: 65
End: 2025-08-12
Payer: MEDICARE

## 2025-08-14 DIAGNOSIS — I10 BENIGN ESSENTIAL HYPERTENSION: ICD-10-CM

## 2025-08-14 RX ORDER — LISINOPRIL AND HYDROCHLOROTHIAZIDE 12.5; 2 MG/1; MG/1
1 TABLET ORAL DAILY
Qty: 90 TABLET | Refills: 1 | Status: SHIPPED | OUTPATIENT
Start: 2025-08-14

## 2025-09-12 ENCOUNTER — APPOINTMENT (OUTPATIENT)
Dept: PRIMARY CARE | Facility: CLINIC | Age: 65
End: 2025-09-12
Payer: MEDICARE